# Patient Record
Sex: MALE | Employment: FULL TIME | URBAN - METROPOLITAN AREA
[De-identification: names, ages, dates, MRNs, and addresses within clinical notes are randomized per-mention and may not be internally consistent; named-entity substitution may affect disease eponyms.]

---

## 2023-02-09 ENCOUNTER — TELEPHONE (OUTPATIENT)
Dept: NEPHROLOGY | Facility: CLINIC | Age: 38
End: 2023-02-09
Payer: COMMERCIAL

## 2023-02-09 DIAGNOSIS — R79.89 ELEVATED SERUM CREATININE: Primary | ICD-10-CM

## 2023-02-09 NOTE — TELEPHONE ENCOUNTER
[facility] Call Center    Phone Message    May a detailed message be left on voicemail: yes     Reason for Call: Order(s): Other:   Reason for requested: new neph  Date needed: ASAP  Provider name: Vandana Helton MD    Fax - [phone]  Fax to [facility]  Action Taken: Message routed to:  [facility] ([facility]): Neph    Travel Screening: Not Applicable

## 2023-02-10 NOTE — TELEPHONE ENCOUNTER
DIAGNOSIS: Chronic Kidney Disease    DATE RECEIVED: 02.21.2023   NOTES STATUS DETAILS   OFFICE NOTE from referring provider Care Everywhere 02.09.2023 Rip Lora MD [facility]   OFFICE NOTE from other specialist  Care Everywhere 12.20.2022 Cher Nettles [place]    09.19.2022 Mati Wolf MD  [facility]     More in Care Everywhere   *Only VASCULITIS or LUPUS gather office notes for the following     *PULMONARY       *ENT     *DERMATOLOGY     *RHEUMATOLOGY     DISCHARGE SUMMARY from hospital     DISCHARGE REPORT from the ER     MEDICATION LIST Care Everywhere    IMAGING  (NEED IMAGES AND REPORTS)     KIDNEY CT SCAN     KIDNEY ULTRASOUND     MR ABDOMEN     NUCLEAR MEDICINE RENAL     LABS     CBC Care Everywhere 02.02.2023   CMP Care Everywhere 02.02.2023   BMP Care Everywhere 02.02.2023   UA Care Everywhere 12.27.2022   URINE PROTEIN Care Everywhere 12.27.2022   RENAL PANEL Care Everywhere 12.20.2022   BIOPSY     KIDNEY BIOPSY

## 2023-02-21 ENCOUNTER — PRE VISIT (OUTPATIENT)
Dept: NEPHROLOGY | Facility: CLINIC | Age: 38
End: 2023-02-21
Payer: COMMERCIAL

## 2023-03-23 ENCOUNTER — TELEPHONE (OUTPATIENT)
Dept: NEPHROLOGY | Facility: CLINIC | Age: 38
End: 2023-03-23
Payer: COMMERCIAL

## 2023-03-23 DIAGNOSIS — R79.89 ELEVATED SERUM CREATININE: Primary | ICD-10-CM

## 2023-03-23 NOTE — TELEPHONE ENCOUNTER
Nephrology Patient Completing Outside Labs    Appointment Date & Time: 5/15/2023    Preferred Lab: [facility]     Preferred Lab Locations: ([facility]) JIMMY Mckee    Fax: Pt doesn't have fax number and is requesting a call from clinic , pt will get fax number     Phone Number:

## 2023-04-08 ENCOUNTER — HEALTH MAINTENANCE LETTER (OUTPATIENT)
Age: 38
End: 2023-04-08

## 2023-05-15 ENCOUNTER — VIRTUAL VISIT (OUTPATIENT)
Dept: NEPHROLOGY | Facility: CLINIC | Age: 38
End: 2023-05-15
Attending: INTERNAL MEDICINE
Payer: COMMERCIAL

## 2023-05-15 DIAGNOSIS — D64.9 ANEMIA, UNSPECIFIED TYPE: ICD-10-CM

## 2023-05-15 DIAGNOSIS — Z94.4 LIVER REPLACED BY TRANSPLANT (H): ICD-10-CM

## 2023-05-15 DIAGNOSIS — N25.81 SECONDARY HYPERPARATHYROIDISM (H): ICD-10-CM

## 2023-05-15 DIAGNOSIS — D84.9 IMMUNOSUPPRESSION (H): ICD-10-CM

## 2023-05-15 DIAGNOSIS — E87.20 METABOLIC ACIDOSIS: ICD-10-CM

## 2023-05-15 DIAGNOSIS — E87.5 HYPERKALEMIA: ICD-10-CM

## 2023-05-15 DIAGNOSIS — Q44.2 BILIARY ATRESIA (H): ICD-10-CM

## 2023-05-15 DIAGNOSIS — N18.32 CHRONIC KIDNEY DISEASE, STAGE 3B (H): Primary | ICD-10-CM

## 2023-05-15 DIAGNOSIS — N28.1 BILATERAL RENAL CYSTS: ICD-10-CM

## 2023-05-15 PROCEDURE — 99205 OFFICE O/P NEW HI 60 MIN: CPT | Mod: VID | Performed by: INTERNAL MEDICINE

## 2023-05-15 NOTE — PATIENT INSTRUCTIONS
"Patient Education     Discharge Instructions for Hyperkalemia  You have been diagnosed with hyperkalemia. This means you have a high level of potassium in your blood. Potassium is important to the function of the nerve and muscle cells. This includes the cells of the heart. But a high level of potassium in the blood causes serious problems. These include abnormal heart rhythms and even heart attack.   Diet changes  Eat less of these potassium-rich foods:   Bananas  Apricots, fresh or dried  Oranges and orange juice  Grapefruit juice  Tomatoes, tomato sauce, and tomato juice  Spinach  Green, leafy vegetables, including salad greens, kale, broccoli, chard, and collards  Melons of all kinds  Peas  Beans  Potatoes  Sweet potatoes  Avocados and guacamole  Vegetable juice (homemade or store-bought) and vegetable juice cocktail  Fruit juices  Nuts, including pistachios, almonds, peanuts, hazelnuts, Brazil nuts, cashews, or mixed nuts  \"Lite\" or reduced sodium salt  Other home care  Tell your healthcare provider about all prescription and over-the-counter medicines. Also tell them about herbal or dietary supplements you are taking. Certain medicines and supplements can increase potassium levels.  Take all medicines exactly as directed.  Have your potassium levels checked regularly.  Keep all follow-up appointments. Your healthcare provider needs to monitor your condition closely.  Learn to take your own pulse. If your pulse is less than 60 beats per minute, greater than 100 beats per minute, or irregular, call your provider.    Follow-up  Follow up with your healthcare provider, or as advised.   When to call your healthcare provider  Call your healthcare provider right away if you have any of the following:   Slow, irregular heartbeat  Fatigue  Dizziness  Lightheadedness  Confusion  Weakness  Call 911  Call 911 if you have any of these:   Chest pain  Fainting  Shortness of breath  Rajani last reviewed this educational " content on 7/1/2022 2000-2022 [employer]. All rights reserved. This information is not intended as a substitute for professional medical care. Always follow your healthcare professional's instructions.

## 2023-05-15 NOTE — LETTER
5/15/2023       RE: Matthew Arias  [address]  [place] [address]     Dear Colleague,    Thank you for referring your patient, Matthew Arias, to the [facility] [place] at [facility]. Please see a copy of my visit note below.    Virtual Visit Details    Type of service:  Video Visit   Video Start Time: 2:56 PM  Video End Time:3:22 PM    Originating Location (pt. Location): Home  Distant Location (provider location):  On-site  Platform used for Video Visit: [facility]       Nephrology Clinic Note  May 15, 2023    Pt was self referred because of change in insurance    CC: CKD stage IIIb vs CKD stage IV    HPI: Matthew Arias is a 37 year old male with PMH significant for biliary atresia s/p 2 liver transplants, CKD, seizures who presents for evaluation and management of CKD.     Pt presented to nephrology video visit to establish care for his CKD. He was first notified about CKD was after his second transplant in 2003. Since that time he was known to have CKD that was slowly progressing. His transplant was done in [place], so following with nephrology there, but he moved to [place], he was instructed to find nephrology closer. Later he was seen in [place], last visit was in dec 2022. But his insurance change, so now establishing care with  or . He was having URI symptoms and was negative for common viruses yesterday including COVID. Mentioned he is slightly feeling better today. Currently on Doxy for possible infection. No new symptoms today. Not checking BP at home, but clinic BP been stable.     No new changes in his meds recently. Endorsed that with his first liver he had multiple rejection episodes but with second one, he is been more stable. He works full time.    - History of urinary symptoms: no  - History of Hematuria: no  - Swelling: no  - Hx of UTIs: no  - Hx of stones: no  - Rashes/Joint pain: no  - Family hx of  kidney disease: Grandfather had renal disease but does not know the cause  - NSAID use: no     Not on File    No current outpatient medications on file prior to visit.  No current facility-administered medications on file prior to visit.      No past medical history on file.    No past surgical history on file.    Social History     Tobacco Use    Smoking status: Never    Smokeless tobacco: Never   Vaping Use    Vaping status: Never Used       No family history on file.    ROS: A 12 system review of systems was negative other than noted here or above.     Exam:  There were no vitals taken for this visit.    GENERAL APPEARANCE: alert and no distress  EYES:  no scleral icterus  HENT: no gross abnormalities noted  RESP: able to speak in full sentences, no audible wheezing or no accessory muscle usage.   CV: denied edema  SKIN: no visible facial rash  NEURO: mentation intact and speech normal  PSYCH: affect normal/bright    Results:    No visits with results within 1 Day(s) from this visit.   Latest known visit with results is:   No results found for any previous visit.       Assessment/Plan:     CKD stage IIIb vs IV  Recurrent TRISTON episodes  B/l multiple renal cysts  Pt with baseline creatinine of 2.2 to 2.8 since 2017 with eGFR on 30 and 40's which put him in CKD stage IIIb. Creatinine can up trend to 3's with eGFR in 20's but improves to eGFR of 30's. UA was bland with out hematuria. Noted to have proteinuria but repeat was WNL. CT abdomen in June 2022 showed multiple cysts like before which likely hemorrhagic but no nephrolithiasis or hydronephrosis noted. CKD likely 2/2 CNI use and or renal cyst disease. Change in creatinine likely 2/2 hypovolemia vs others. Pt denied any ibuprofen or other NSAID's Use.  - encouraged good hydration   - low salt diet   - fresh fruits and vegetables   - continue to monitor renal function   - will consider transplant referral during next visit based on his labs     Lower urinary tract  symptoms   Bladder wall thickness increased  Pt noted to wake up once a night in the recent past. He did made some life style changes including voiding bladder before going to bed and decrease fluid intake during evening times. Will follow up in his symptoms, if similar or getting worse, will refer to urology for urodynamics before starting on tamsulosin as he is young.    Hypertension :  BP been stable during clinic visits (in care every where). Was on losartan but discontinued because of hypotension. Not on meds now.  - continue to monitor    Electrolytes :  Hyperkalemia, mild  Noted potassium of 5.3 likely 2/2 CKD. Continue to monitor for now. Increase hydration, continue bicarb supplementation and decrease potassium intake   - gave hand out about potassium rich foods to avoid    BMD :  Daniel was WNL  Secondary hyperparathyroidism   PTH is elevated likely 2/2 CKD. No vit D collected during this visit. But instructed to continue vit D     Metabolic acidosis :   Bicarb was low but repeat was at 22, continue on current supplementation     Anemia :  Hb is low, but above goal for ERIKA therapy. Continue to monitor for now.    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  Was on CNI, now on MMF and prednisone.    Total time spent on the day of clinic visit was 60 min including face to face time of 30 min with pt, labs and image review in care every where, previous nephrology note in care every where, documentation as above.    Vandana Helton  Dept of Nephrology and Hypertension  [facility]

## 2023-05-15 NOTE — PROGRESS NOTES
Virtual Visit Details    Type of service:  Video Visit   Video Start Time: 2:56 PM  Video End Time:3:22 PM    Originating Location (pt. Location): Home  Distant Location (provider location):  On-site  Platform used for Video Visit: Well       Nephrology Clinic Note  May 15, 2023    Pt was self referred because of change in insurance    CC: CKD stage IIIb vs CKD stage IV    HPI: Matthew Arias is a 37 year old male with PMH significant for biliary atresia s/p 2 liver transplants, CKD, seizures who presents for evaluation and management of CKD.     Pt presented to nephrology video visit to establish care for his CKD. He was first notified about CKD was after his second transplant in 2003. Since that time he was known to have CKD that was slowly progressing. His transplant was done in [place], so following with nephrology there, but he moved to [place], he was instructed to find nephrology closer. Later he was seen in [place], last visit was in dec 2022. But his insurance change, so now establishing care with [facility] or [facility]. He was having URI symptoms and was negative for common viruses yesterday including COVID. Mentioned he is slightly feeling better today. Currently on Doxy for possible infection. No new symptoms today. Not checking BP at home, but clinic BP been stable.     No new changes in his meds recently. Endorsed that with his first liver he had multiple rejection episodes but with second one, he is been more stable. He works full time.    - History of urinary symptoms: no  - History of Hematuria: no  - Swelling: no  - Hx of UTIs: no  - Hx of stones: no  - Rashes/Joint pain: no  - Family hx of kidney disease: Grandfather had renal disease but does not know the cause  - NSAID use: no     Not on File    No current outpatient medications on file prior to visit.  No current facility-administered medications on file prior to visit.      No past medical history on file.    No past surgical history on file.    Social  History     Tobacco Use     Smoking status: Never     Smokeless tobacco: Never   Vaping Use     Vaping status: Never Used       No family history on file.    ROS: A 12 system review of systems was negative other than noted here or above.     Exam:  There were no vitals taken for this visit.    GENERAL APPEARANCE: alert and no distress  EYES:  no scleral icterus  HENT: no gross abnormalities noted  RESP: able to speak in full sentences, no audible wheezing or no accessory muscle usage.   CV: denied edema  SKIN: no visible facial rash  NEURO: mentation intact and speech normal  PSYCH: affect normal/bright    Results:    No visits with results within 1 Day(s) from this visit.   Latest known visit with results is:   No results found for any previous visit.       Assessment/Plan:     CKD stage IIIb vs IV  Recurrent TRISTON episodes  B/l multiple renal cysts  Pt with baseline creatinine of 2.2 to 2.8 since 2017 with eGFR on 30 and 40's which put him in CKD stage IIIb. Creatinine can up trend to 3's with eGFR in 20's but improves to eGFR of 30's. UA was bland with out hematuria. Noted to have proteinuria but repeat was WNL. CT abdomen in June 2022 showed multiple cysts like before which likely hemorrhagic but no nephrolithiasis or hydronephrosis noted. CKD likely 2/2 CNI use and or renal cyst disease. Change in creatinine likely 2/2 hypovolemia vs others. Pt denied any ibuprofen or other NSAID's Use.  - encouraged good hydration   - low salt diet   - fresh fruits and vegetables   - continue to monitor renal function   - will consider transplant referral during next visit based on his labs     Lower urinary tract symptoms   Bladder wall thickness increased  Pt noted to wake up once a night in the recent past. He did made some life style changes including voiding bladder before going to bed and decrease fluid intake during evening times. Will follow up in his symptoms, if similar or getting worse, will refer to urology for  urodynamics before starting on tamsulosin as he is young.    Hypertension :  BP been stable during clinic visits (in care every where). Was on losartan but discontinued because of hypotension. Not on meds now.  - continue to monitor    Electrolytes :  Hyperkalemia, mild  Noted potassium of 5.3 likely 2/2 CKD. Continue to monitor for now. Increase hydration, continue bicarb supplementation and decrease potassium intake   - gave hand out about potassium rich foods to avoid    BMD :  Daniel was WNL  Secondary hyperparathyroidism   PTH is elevated likely 2/2 CKD. No vit D collected during this visit. But instructed to continue vit D     Metabolic acidosis :   Bicarb was low but repeat was at 22, continue on current supplementation     Anemia :  Hb is low, but above goal for ERIKA therapy. Continue to monitor for now.    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  Was on CNI, now on MMF and prednisone.    Total time spent on the day of clinic visit was 60 min including face to face time of 30 min with pt, labs and image review in care every where, previous nephrology note in care every where, documentation as above.    Vandana Helton  Dept of Nephrology and Hypertension  [facility]

## 2023-05-15 NOTE — NURSING NOTE
Is the patient currently in the state of [place]? YES    Visit mode:VIDEO    If the visit is dropped, the patient can be reconnected by: VIDEO VISIT: Send to e-mail at: [email or web address]    Will anyone else be joining the visit? NO      How would you like to obtain your AVS? MyChart    Are changes needed to the allergy or medication list? NO    Reason for visit: Video Visit

## 2023-10-02 ENCOUNTER — TRANSFERRED RECORDS (OUTPATIENT)
Dept: HEALTH INFORMATION MANAGEMENT | Facility: CLINIC | Age: 38
End: 2023-10-02

## 2024-01-05 ENCOUNTER — OFFICE VISIT (OUTPATIENT)
Dept: NEPHROLOGY | Facility: CLINIC | Age: 39
End: 2024-01-05
Attending: INTERNAL MEDICINE
Payer: COMMERCIAL

## 2024-01-05 ENCOUNTER — LAB (OUTPATIENT)
Dept: LAB | Facility: CLINIC | Age: 39
End: 2024-01-05
Payer: COMMERCIAL

## 2024-01-05 VITALS
OXYGEN SATURATION: 98 % | TEMPERATURE: 99.5 F | HEART RATE: 89 BPM | SYSTOLIC BLOOD PRESSURE: 116 MMHG | DIASTOLIC BLOOD PRESSURE: 68 MMHG

## 2024-01-05 DIAGNOSIS — E87.20 METABOLIC ACIDOSIS: ICD-10-CM

## 2024-01-05 DIAGNOSIS — E83.51 HYPOCALCEMIA: ICD-10-CM

## 2024-01-05 DIAGNOSIS — D63.1 ANEMIA IN STAGE 3B CHRONIC KIDNEY DISEASE (H): ICD-10-CM

## 2024-01-05 DIAGNOSIS — N25.81 SECONDARY HYPERPARATHYROIDISM (H): ICD-10-CM

## 2024-01-05 DIAGNOSIS — Z94.4 LIVER REPLACED BY TRANSPLANT (H): Primary | ICD-10-CM

## 2024-01-05 DIAGNOSIS — D84.9 IMMUNOSUPPRESSION (H): ICD-10-CM

## 2024-01-05 DIAGNOSIS — N18.32 ANEMIA IN STAGE 3B CHRONIC KIDNEY DISEASE (H): ICD-10-CM

## 2024-01-05 DIAGNOSIS — E55.9 HYPOVITAMINOSIS D: ICD-10-CM

## 2024-01-05 DIAGNOSIS — N28.1 BILATERAL RENAL CYSTS: ICD-10-CM

## 2024-01-05 DIAGNOSIS — R80.9 PROTEINURIA, UNSPECIFIED TYPE: ICD-10-CM

## 2024-01-05 DIAGNOSIS — R79.89 ELEVATED SERUM CREATININE: ICD-10-CM

## 2024-01-05 LAB
ALBUMIN MFR UR ELPH: 233 MG/DL
ALBUMIN SERPL BCG-MCNC: 3.6 G/DL (ref 3.5–5.2)
ALBUMIN UR-MCNC: 100 MG/DL
ANION GAP SERPL CALCULATED.3IONS-SCNC: 10 MMOL/L (ref 7–15)
APPEARANCE UR: CLEAR
BILIRUB UR QL STRIP: NEGATIVE
BUN SERPL-MCNC: 41.3 MG/DL (ref 6–20)
CALCIUM SERPL-MCNC: 8.2 MG/DL (ref 8.6–10)
CHLORIDE SERPL-SCNC: 105 MMOL/L (ref 98–107)
COLOR UR AUTO: ABNORMAL
CREAT SERPL-MCNC: 2.55 MG/DL (ref 0.67–1.17)
CREAT UR-MCNC: 65.2 MG/DL
DEPRECATED HCO3 PLAS-SCNC: 21 MMOL/L (ref 22–29)
EGFRCR SERPLBLD CKD-EPI 2021: 32 ML/MIN/1.73M2
ERYTHROCYTE [DISTWIDTH] IN BLOOD BY AUTOMATED COUNT: 14.9 % (ref 10–15)
GLUCOSE SERPL-MCNC: 105 MG/DL (ref 70–99)
GLUCOSE UR STRIP-MCNC: NEGATIVE MG/DL
HCT VFR BLD AUTO: 34.3 % (ref 40–53)
HGB BLD-MCNC: 11.2 G/DL (ref 13.3–17.7)
HGB UR QL STRIP: NEGATIVE
HYALINE CASTS: 5 /LPF
KETONES UR STRIP-MCNC: NEGATIVE MG/DL
LEUKOCYTE ESTERASE UR QL STRIP: NEGATIVE
MCH RBC QN AUTO: 28.4 PG (ref 26.5–33)
MCHC RBC AUTO-ENTMCNC: 32.7 G/DL (ref 31.5–36.5)
MCV RBC AUTO: 87 FL (ref 78–100)
NITRATE UR QL: NEGATIVE
PH UR STRIP: 5.5 [PH] (ref 5–7)
PHOSPHATE SERPL-MCNC: 2.6 MG/DL (ref 2.5–4.5)
PLATELET # BLD AUTO: 345 10E3/UL (ref 150–450)
POTASSIUM SERPL-SCNC: 3.8 MMOL/L (ref 3.4–5.3)
PROT/CREAT 24H UR: 3.57 MG/MG CR (ref 0–0.2)
PTH-INTACT SERPL-MCNC: 139 PG/ML (ref 15–65)
PTH-INTACT SERPL-MCNC: 142 PG/ML (ref 15–65)
RBC # BLD AUTO: 3.95 10E6/UL (ref 4.4–5.9)
RBC URINE: 1 /HPF
SODIUM SERPL-SCNC: 136 MMOL/L (ref 135–145)
SP GR UR STRIP: 1.01 (ref 1–1.03)
UROBILINOGEN UR STRIP-MCNC: NORMAL MG/DL
VIT D+METAB SERPL-MCNC: 16 NG/ML (ref 20–50)
WBC # BLD AUTO: 13.9 10E3/UL (ref 4–11)
WBC URINE: <1 /HPF

## 2024-01-05 PROCEDURE — 80069 RENAL FUNCTION PANEL: CPT | Performed by: PATHOLOGY

## 2024-01-05 PROCEDURE — 99215 OFFICE O/P EST HI 40 MIN: CPT | Performed by: INTERNAL MEDICINE

## 2024-01-05 PROCEDURE — 84156 ASSAY OF PROTEIN URINE: CPT | Performed by: PATHOLOGY

## 2024-01-05 PROCEDURE — 82306 VITAMIN D 25 HYDROXY: CPT | Performed by: INTERNAL MEDICINE

## 2024-01-05 PROCEDURE — 85027 COMPLETE CBC AUTOMATED: CPT | Performed by: PATHOLOGY

## 2024-01-05 PROCEDURE — 99211 OFF/OP EST MAY X REQ PHY/QHP: CPT | Performed by: INTERNAL MEDICINE

## 2024-01-05 PROCEDURE — 82570 ASSAY OF URINE CREATININE: CPT | Performed by: INTERNAL MEDICINE

## 2024-01-05 PROCEDURE — 99000 SPECIMEN HANDLING OFFICE-LAB: CPT | Performed by: PATHOLOGY

## 2024-01-05 PROCEDURE — 83970 ASSAY OF PARATHORMONE: CPT | Performed by: PATHOLOGY

## 2024-01-05 PROCEDURE — 81001 URINALYSIS AUTO W/SCOPE: CPT | Performed by: PATHOLOGY

## 2024-01-05 PROCEDURE — 36415 COLL VENOUS BLD VENIPUNCTURE: CPT | Performed by: PATHOLOGY

## 2024-01-05 RX ORDER — PREDNISONE 5 MG/1
10 TABLET ORAL DAILY
COMMUNITY

## 2024-01-05 RX ORDER — MYCOPHENOLATE MOFETIL 250 MG/1
2 CAPSULE ORAL EVERY 12 HOURS
COMMUNITY
Start: 2023-04-13

## 2024-01-05 RX ORDER — LEVETIRACETAM 500 MG/1
500 TABLET ORAL EVERY 12 HOURS
COMMUNITY
Start: 2023-04-13

## 2024-01-05 RX ORDER — ACETAMINOPHEN 500 MG
500 TABLET ORAL
COMMUNITY
Start: 2023-07-01

## 2024-01-05 RX ORDER — SODIUM BICARBONATE 325 MG/1
TABLET ORAL
COMMUNITY
Start: 2023-04-13

## 2024-01-05 RX ORDER — OMEPRAZOLE 40 MG/1
1 CAPSULE, DELAYED RELEASE ORAL EVERY 12 HOURS
COMMUNITY
Start: 2023-12-13

## 2024-01-05 RX ORDER — BUPROPION HYDROCHLORIDE 100 MG/1
150 TABLET, EXTENDED RELEASE ORAL
COMMUNITY

## 2024-01-05 ASSESSMENT — PAIN SCALES - GENERAL: PAINLEVEL: NO PAIN (0)

## 2024-01-05 NOTE — LETTER
1/5/2024       RE: Matthew Arias  [address]  [place] [address]     Dear Colleague,    Thank you for referring your patient, Mathtew Arias, to the [facility] [place] at [facility]. Please see a copy of my visit note below.        Nephrology Clinic Note    Pt was self referred because of change in insurance    CC: CKD stage IIIb vs CKD stage IV    HPI: Matthew Arias is a 37 year old male with PMH significant for biliary atresia s/p 2 liver transplants, CKD, seizures who presents for evaluation and management of CKD.     Pt presented to nephrology video visit to establish care for his CKD. He was first notified about CKD was after his second transplant in 2003. Since that time he was known to have CKD that was slowly progressing. His transplant was done in [place], so following with nephrology there, but he moved to [place], he was instructed to find nephrology closer. Later he was seen in [place], last visit was in dec 2022. But his insurance change, so now establishing care with  or . He was having URI symptoms and was negative for common viruses yesterday including COVID. Mentioned he is slightly feeling better today. Currently on Doxy for possible infection. No new symptoms today. Not checking BP at home, but clinic BP been stable.     No new changes in his meds recently. Endorsed that with his first liver he had multiple rejection episodes but with second one, he is been more stable. He works full time.    1/5/2024  Pt with new URI symptoms started 2 days ago. He felt his symptoms are stable today. H/o COVID Infection, last time was 1.5 years ago, cleared on its own, no hospitalization. Had COVID vaccines and booster mid of last year. No associated cough, chest pain or SOB. Denied nausea/vomiting, diarrhea, abdominal pain. H/o second Liver transplant 22 years ago. Continued to be on immunosuppressive  medications.    - History of urinary symptoms: no  - History of Hematuria: no  - Swelling: no  - Hx of UTIs: no  - Hx of stones: no  - Rashes/Joint pain: no  - Family hx of kidney disease: Grandfather had renal disease but does not know the cause  - NSAID use: no       Allergies   Allergen Reactions    Levofloxacin      Other Reaction(s): Renal Failure    Hx of renal failure    Amoxicillin-Pot Clavulanate Rash     Patient reported this was a couple years ago and he did not need treatment for the rash. Just stopped taking the medication.    Bee Pollen Itching     Allergic Rhinitis, Itchy watery eyes    Seasonal Allergies Itching     Allergic Rhinitis, Itchy watery eyes    Cefprozil GI Disturbance     Other Reaction(s): GI Upset    Clavulanic Acid Unknown     Other Reaction(s): Other (see comments)    Erythromycin      Other Reaction(s): GI UPSET, GI Upset    Gramineae Pollens Other (See Comments)    Sulfa Antibiotics      Other Reaction(s): *Unknown - Childhood Rxn    Macrolides And Ketolides Rash     Other Reaction(s): GI UPSET, UNKNOWN    Penicillins Rash       No current outpatient medications on file prior to visit.  No current facility-administered medications on file prior to visit.      No past medical history on file.    No past surgical history on file.    Social History     Tobacco Use    Smoking status: Never    Smokeless tobacco: Never   Vaping Use    Vaping Use: Never used   Substance Use Topics    Drug use: Never       No family history on file.    ROS: A 12 system review of systems was negative other than noted here or above.     Exam:  /68 (BP Location: Right arm, Patient Position: Sitting, Cuff Size: Adult Regular)   Pulse 89   Temp 99.5  F (37.5  C) (Oral)   SpO2 98%     GENERAL APPEARANCE: alert and no distress  EYES:  no scleral icterus  HENT: no gross abnormalities noted  RESP: able to speak in full sentences, no audible wheezing or no accessory muscle usage.   CV: denied edema  SKIN: no  visible facial rash  NEURO: mentation intact and speech normal  PSYCH: affect normal/bright    Results:    No visits with results within 1 Day(s) from this visit.   Latest known visit with results is:   No results found for any previous visit.       Assessment/Plan:     CKD stage IIIb vs IV  Recurrent TRISTON episodes  B/l multiple renal cysts  Pt with baseline creatinine of 2.2 to 2.8 since 2017 with eGFR on 30 and 40's which put him in CKD stage IIIb. Creatinine can up trend to 3's with eGFR in 20's but improves to eGFR of 30's. UA was bland with out hematuria. Noted to have proteinuria but repeat was WNL. CT abdomen in June 2022 showed multiple cysts like before which likely hemorrhagic but no nephrolithiasis or hydronephrosis noted. CKD likely 2/2 CNI use and or renal cyst disease. Change in creatinine likely 2/2 hypovolemia vs others. Pt denied any ibuprofen or other NSAID's Use.  - encouraged good hydration   - low salt diet   - fresh fruits and vegetables   - continue to monitor renal function   - will consider transplant referral during next visit based on his labs   - will repeat his renal US to assess renal cysts, ? About polycystic renal disease.    Lower urinary tract symptoms   Bladder wall thickness increased  Pt noted to wake up once a night in the recent past. He did made some life style changes including voiding bladder before going to bed and decrease fluid intake during evening times. No worsening symptoms reported today.    Hypertension :  BP been stable during clinic visits (in care every where). Was on losartan but discontinued because of hypotension. Not on meds now.  - continue to monitor    Electrolytes :  Hyperkalemia, mild, improved on today's labs  Noted potassium of 5.3 likely 2/2 CKD. Continue to monitor for now with increase hydration, continuing bicarb supplementation.    BMD :  Daniel was low, ? Low vitamin D  Secondary hyperparathyroidism   PTH is elevated likely 2/2 CKD.  On low dose  vitamin D supplementation, takes it when remember   His previous Vit D is low, repeat today is still pending. If low will restart him on daily vs weekly supplementation.    Metabolic acidosis :   Bicarb was low, continue on current supplementation     Anemia :  Hb is low, but above goal for ERIKA therapy. Continue to monitor for now.    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  Was on CNI, now on MMF and prednisone.    Total time spent on the day of clinic visit was 40 min including face to face time of 20 min with pt, labs and image review in care every where, previous nephrology note in care every where, documentation as above.    Vandana Helton  Dept of Nephrology and Hypertension  [facility]

## 2024-01-05 NOTE — PROGRESS NOTES
Nephrology Clinic Note    Pt was self referred because of change in insurance    CC: CKD stage IIIb vs CKD stage IV    HPI: Matthew Arias is a 37 year old male with PMH significant for biliary atresia s/p 2 liver transplants, CKD, seizures who presents for evaluation and management of CKD.     Pt presented to nephrology video visit to establish care for his CKD. He was first notified about CKD was after his second transplant in 2003. Since that time he was known to have CKD that was slowly progressing. His transplant was done in [place], so following with nephrology there, but he moved to [place], he was instructed to find nephrology closer. Later he was seen in [place], last visit was in dec 2022. But his insurance change, so now establishing care with U or M. He was having URI symptoms and was negative for common viruses yesterday including COVID. Mentioned he is slightly feeling better today. Currently on Doxy for possible infection. No new symptoms today. Not checking BP at home, but clinic BP been stable.     No new changes in his meds recently. Endorsed that with his first liver he had multiple rejection episodes but with second one, he is been more stable. He works full time.    1/5/2024  Pt with new URI symptoms started 2 days ago. He felt his symptoms are stable today. H/o COVID Infection, last time was 1.5 years ago, cleared on its own, no hospitalization. Had COVID vaccines and booster mid of last year. No associated cough, chest pain or SOB. Denied nausea/vomiting, diarrhea, abdominal pain. H/o second Liver transplant 22 years ago. Continued to be on immunosuppressive medications.    - History of urinary symptoms: no  - History of Hematuria: no  - Swelling: no  - Hx of UTIs: no  - Hx of stones: no  - Rashes/Joint pain: no  - Family hx of kidney disease: Grandfather had renal disease but does not know the cause  - NSAID use: no       Allergies   Allergen Reactions    Levofloxacin      Other  Reaction(s): Renal Failure    Hx of renal failure    Amoxicillin-Pot Clavulanate Rash     Patient reported this was a couple years ago and he did not need treatment for the rash. Just stopped taking the medication.    Bee Pollen Itching     Allergic Rhinitis, Itchy watery eyes    Seasonal Allergies Itching     Allergic Rhinitis, Itchy watery eyes    Cefprozil GI Disturbance     Other Reaction(s): GI Upset    Clavulanic Acid Unknown     Other Reaction(s): Other (see comments)    Erythromycin      Other Reaction(s): GI UPSET, GI Upset    Gramineae Pollens Other (See Comments)    Sulfa Antibiotics      Other Reaction(s): *Unknown - Childhood Rxn    Macrolides And Ketolides Rash     Other Reaction(s): GI UPSET, UNKNOWN    Penicillins Rash       No current outpatient medications on file prior to visit.  No current facility-administered medications on file prior to visit.      No past medical history on file.    No past surgical history on file.    Social History     Tobacco Use    Smoking status: Never    Smokeless tobacco: Never   Vaping Use    Vaping Use: Never used   Substance Use Topics    Drug use: Never       No family history on file.    ROS: A 12 system review of systems was negative other than noted here or above.     Exam:  /68 (BP Location: Right arm, Patient Position: Sitting, Cuff Size: Adult Regular)   Pulse 89   Temp 99.5  F (37.5  C) (Oral)   SpO2 98%     GENERAL APPEARANCE: alert and no distress  EYES:  no scleral icterus  HENT: no gross abnormalities noted  RESP: able to speak in full sentences, no audible wheezing or no accessory muscle usage.   CV: denied edema  SKIN: no visible facial rash  NEURO: mentation intact and speech normal  PSYCH: affect normal/bright    Results:    No visits with results within 1 Day(s) from this visit.   Latest known visit with results is:   No results found for any previous visit.       Assessment/Plan:     CKD stage IIIb vs IV  Recurrent TRISTON episodes  B/l  multiple renal cysts  Pt with baseline creatinine of 2.2 to 2.8 since 2017 with eGFR on 30 and 40's which put him in CKD stage IIIb. Creatinine can up trend to 3's with eGFR in 20's but improves to eGFR of 30's. UA was bland with out hematuria. Noted to have proteinuria but repeat was WNL. CT abdomen in June 2022 showed multiple cysts like before which likely hemorrhagic but no nephrolithiasis or hydronephrosis noted. CKD likely 2/2 CNI use and or renal cyst disease. Change in creatinine likely 2/2 hypovolemia vs others. Pt denied any ibuprofen or other NSAID's Use.  - encouraged good hydration   - low salt diet   - fresh fruits and vegetables   - continue to monitor renal function   - will consider transplant referral during next visit based on his labs   - will repeat his renal US to assess renal cysts, ? About polycystic renal disease.    Lower urinary tract symptoms   Bladder wall thickness increased  Pt noted to wake up once a night in the recent past. He did made some life style changes including voiding bladder before going to bed and decrease fluid intake during evening times. No worsening symptoms reported today.    Hypertension :  BP been stable during clinic visits (in care every where). Was on losartan but discontinued because of hypotension. Not on meds now.  - continue to monitor    Electrolytes :  Hyperkalemia, mild, improved on today's labs  Noted potassium of 5.3 likely 2/2 CKD. Continue to monitor for now with increase hydration, continuing bicarb supplementation.    BMD :  Daniel was low, ? Low vitamin D  Secondary hyperparathyroidism   PTH is elevated likely 2/2 CKD.  On low dose vitamin D supplementation, takes it when remember   His previous Vit D is low, repeat today is still pending. If low will restart him on daily vs weekly supplementation.    Metabolic acidosis :   Bicarb was low, continue on current supplementation     Anemia :  Hb is low, but above goal for ERIKA therapy. Continue to monitor  for now.    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  Was on CNI, now on MMF and prednisone.    Total time spent on the day of clinic visit was 40 min including face to face time of 20 min with pt, labs and image review in care every where, previous nephrology note in care every where, documentation as above.    Vandana Helton  Dept of Nephrology and Hypertension  [facility]

## 2024-01-05 NOTE — NURSING NOTE
Chief Complaint   Patient presents with    RECHECK     RTN NEPH     /68 (BP Location: Right arm, Patient Position: Sitting, Cuff Size: Adult Regular)   Pulse 89   Temp 99.5  F (37.5  C) (Oral)   SpO2 98%   Nathan Wolf CMA on 1/5/2024 at 3:13 PM

## 2024-01-09 DIAGNOSIS — R80.9 PROTEINURIA, UNSPECIFIED TYPE: Primary | ICD-10-CM

## 2024-01-10 LAB
CREAT UR-MCNC: 65.2 MG/DL
MICROALBUMIN UR-MCNC: 1242 MG/L
MICROALBUMIN/CREAT UR: 1904.91 MG/G CR (ref 0–17)

## 2024-01-16 ENCOUNTER — HOSPITAL ENCOUNTER (OUTPATIENT)
Dept: ULTRASOUND IMAGING | Facility: CLINIC | Age: 39
Discharge: HOME | End: 2024-01-16
Attending: INTERNAL MEDICINE | Admitting: INTERNAL MEDICINE
Payer: COMMERCIAL

## 2024-01-16 DIAGNOSIS — N28.1 BILATERAL RENAL CYSTS: ICD-10-CM

## 2024-01-16 PROCEDURE — 76770 US EXAM ABDO BACK WALL COMP: CPT

## 2024-03-12 ENCOUNTER — TELEPHONE (OUTPATIENT)
Dept: NEPHROLOGY | Facility: CLINIC | Age: 39
End: 2024-03-12
Payer: COMMERCIAL

## 2024-03-29 ENCOUNTER — TELEPHONE (OUTPATIENT)
Dept: NEPHROLOGY | Facility: CLINIC | Age: 39
End: 2024-03-29
Payer: COMMERCIAL

## 2024-04-15 ENCOUNTER — TRANSFERRED RECORDS (OUTPATIENT)
Dept: HEALTH INFORMATION MANAGEMENT | Facility: CLINIC | Age: 39
End: 2024-04-15

## 2024-04-17 ENCOUNTER — TELEPHONE (OUTPATIENT)
Dept: NEPHROLOGY | Facility: CLINIC | Age: 39
End: 2024-04-17
Payer: COMMERCIAL

## 2024-04-17 NOTE — TELEPHONE ENCOUNTER
LVM for pt to reschedule appt on 7.8.24 with Dr. Mohan // informed pt in  that the appt has been cancelled // second attempt 4.17.24, AN

## 2024-06-09 ENCOUNTER — HEALTH MAINTENANCE LETTER (OUTPATIENT)
Age: 39
End: 2024-06-09

## 2024-10-15 ENCOUNTER — TELEPHONE (OUTPATIENT)
Dept: NEPHROLOGY | Facility: CLINIC | Age: 39
End: 2024-10-15
Payer: COMMERCIAL

## 2024-10-15 DIAGNOSIS — N18.32 CHRONIC KIDNEY DISEASE, STAGE 3B (H): Primary | ICD-10-CM

## 2024-10-15 NOTE — TELEPHONE ENCOUNTER
Health Call Center    Phone Message    May a detailed message be left on voicemail: no     Reason for Call: Other: Patient called and is wondering who he should be seeing for a follow up apportionment since Dr Helton is just doing transplant appointments. Please call patient back to confirm.      Action Taken: Message routed to:  [facility] ([facility]): Nephrology    Travel Screening: Not Applicable     Date of Service:

## 2024-10-16 ENCOUNTER — OFFICE VISIT (OUTPATIENT)
Dept: NEPHROLOGY | Facility: CLINIC | Age: 39
End: 2024-10-16
Attending: INTERNAL MEDICINE
Payer: COMMERCIAL

## 2024-10-16 ENCOUNTER — LAB (OUTPATIENT)
Dept: LAB | Facility: CLINIC | Age: 39
End: 2024-10-16
Payer: COMMERCIAL

## 2024-10-16 VITALS
HEART RATE: 72 BPM | WEIGHT: 149 LBS | DIASTOLIC BLOOD PRESSURE: 71 MMHG | SYSTOLIC BLOOD PRESSURE: 130 MMHG | OXYGEN SATURATION: 100 % | TEMPERATURE: 97.5 F

## 2024-10-16 DIAGNOSIS — Z94.4 LIVER REPLACED BY TRANSPLANT (H): ICD-10-CM

## 2024-10-16 DIAGNOSIS — E55.9 HYPOVITAMINOSIS D: ICD-10-CM

## 2024-10-16 DIAGNOSIS — R73.9 ELEVATED BLOOD SUGAR: ICD-10-CM

## 2024-10-16 DIAGNOSIS — N18.4 CKD (CHRONIC KIDNEY DISEASE) STAGE 4, GFR 15-29 ML/MIN (H): Primary | ICD-10-CM

## 2024-10-16 DIAGNOSIS — N18.32 CHRONIC KIDNEY DISEASE, STAGE 3B (H): ICD-10-CM

## 2024-10-16 DIAGNOSIS — R80.1 PERSISTENT PROTEINURIA: ICD-10-CM

## 2024-10-16 LAB
ALBUMIN MFR UR ELPH: 39.6 MG/DL
ALBUMIN SERPL BCG-MCNC: 3.8 G/DL (ref 3.5–5.2)
ANION GAP SERPL CALCULATED.3IONS-SCNC: 9 MMOL/L (ref 7–15)
BUN SERPL-MCNC: 31.4 MG/DL (ref 6–20)
CALCIUM SERPL-MCNC: 9.1 MG/DL (ref 8.8–10.4)
CHLORIDE SERPL-SCNC: 106 MMOL/L (ref 98–107)
CREAT SERPL-MCNC: 2.83 MG/DL (ref 0.67–1.17)
CREAT UR-MCNC: 37.4 MG/DL
CYSTATIN C (ROCHE): 2.7 MG/L (ref 0.6–1)
EGFRCR SERPLBLD CKD-EPI 2021: 28 ML/MIN/1.73M2
EST. AVERAGE GLUCOSE BLD GHB EST-MCNC: 120 MG/DL
GFR/BSA.PRED SERPLBLD CYS-BASED-ARV: 23 ML/MIN/1.73M2
GLUCOSE SERPL-MCNC: 80 MG/DL (ref 70–99)
HBA1C MFR BLD: 5.8 %
HCO3 SERPL-SCNC: 22 MMOL/L (ref 22–29)
HGB BLD-MCNC: 12.3 G/DL (ref 13.3–17.7)
PHOSPHATE SERPL-MCNC: 3.1 MG/DL (ref 2.5–4.5)
POTASSIUM SERPL-SCNC: 4 MMOL/L (ref 3.4–5.3)
PROT/CREAT 24H UR: 1.06 MG/MG CR (ref 0–0.2)
SODIUM SERPL-SCNC: 137 MMOL/L (ref 135–145)
VIT D+METAB SERPL-MCNC: 30 NG/ML (ref 20–50)

## 2024-10-16 PROCEDURE — 82610 CYSTATIN C: CPT | Performed by: INTERNAL MEDICINE

## 2024-10-16 PROCEDURE — 84156 ASSAY OF PROTEIN URINE: CPT | Performed by: PATHOLOGY

## 2024-10-16 PROCEDURE — 85018 HEMOGLOBIN: CPT | Performed by: PATHOLOGY

## 2024-10-16 PROCEDURE — 83036 HEMOGLOBIN GLYCOSYLATED A1C: CPT | Performed by: INTERNAL MEDICINE

## 2024-10-16 PROCEDURE — 82306 VITAMIN D 25 HYDROXY: CPT | Performed by: INTERNAL MEDICINE

## 2024-10-16 PROCEDURE — 80069 RENAL FUNCTION PANEL: CPT | Performed by: PATHOLOGY

## 2024-10-16 PROCEDURE — 99213 OFFICE O/P EST LOW 20 MIN: CPT | Performed by: INTERNAL MEDICINE

## 2024-10-16 PROCEDURE — 36415 COLL VENOUS BLD VENIPUNCTURE: CPT | Performed by: PATHOLOGY

## 2024-10-16 PROCEDURE — 99215 OFFICE O/P EST HI 40 MIN: CPT | Performed by: INTERNAL MEDICINE

## 2024-10-16 PROCEDURE — 99000 SPECIMEN HANDLING OFFICE-LAB: CPT | Performed by: PATHOLOGY

## 2024-10-16 RX ORDER — VITAMIN B COMPLEX
1 TABLET ORAL DAILY
Qty: 90 TABLET | Refills: 3 | Status: SHIPPED | OUTPATIENT
Start: 2024-10-16

## 2024-10-16 ASSESSMENT — PAIN SCALES - GENERAL: PAINLEVEL: NO PAIN (0)

## 2024-10-16 NOTE — LETTER
10/16/2024       RE: Matthew Arias  [address]  [place] [address]     Dear Colleague,    Thank you for referring your patient, Matthew Arias, to the [facility] [place] at [facility]. Please see a copy of my visit note below.        Nephrology Clinic Note      Assessment/Plan:     CKD stage 4- Scr fluctuates between 2.6-3s eGFR was 28ml/min by cystatin C in 2022   Recurrent TRISTON episodes  B/l multiple renal cysts  Pt with baseline creatinine of 2.2 to 2.8 since 2017 with eGFR on 30 and 40's which put him in CKD stage IIIb. Creatinine can up trend to 3's with eGFR in 20's but improves to eGFR of 30's. UA was bland with out hematuria. Noted to have proteinuria but repeat was WNL. CT abdomen in June 2022 showed multiple cysts like before which likely hemorrhagic but no nephrolithiasis or hydronephrosis noted. No genetic testing done.  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 Change in creatinine likely 2/2 hypovolemia vs others. Pt denied any ibuprofen or other NSAID's Use.  - will refer to transplant when eGFR 21-22 , will check cystatin C to compare.  - Proteinuria- he was noted to 1.9 g of albumin, up to 3.5 grams of protein also noted, today is back at 1 gram.  Not sure if he was ill or had other factors. Discussed option for ACE-I or SGLT2 inh and he will discuss this with his transplant team and let me know.      Lower urinary tract symptoms   Bladder wall thickness increased  -not addressed today    Hypertension :  BP been stable during clinic visits (in care every where). Was on losartan but discontinued because of hypotension. Not on meds now.  - continue to monitor, given proteinuria, will consider low dose lisinopril or SGLT2 inhibitor    Episode of sweating- check A1C; has had some elevated glucose (usually not fasting though)    Electrolytes :  Hyperkalemia, mild, improved on today's labs  Noted  potassium of 5.3 likely 2/2 CKD. Continue to monitor for now with increase hydration, continuing bicarb supplementation.    BMD :  Daniel was low,  Low vitamin D- will start cholecalciferal  Secondary hyperparathyroidism   PTH is elevated likely 2/2 CKD.  - cholecalciferol 1000mg daily, repeat in 6 months  His previous Vit D is low, repeat today is still pending. If low will restart him on daily vs weekly supplementation.    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  Was on CNI, now on MMF alone    Total time spent on the day of clinic visit was >40 min including face to face time with patient, labs and image review in care every where, previous nephrology note in care every where, documentation as above.      Followup, CKD stage IV, post liver transplant    HPI: Matthew Arias is a 37 year old male with PMH significant for biliary atresia s/p 2 liver transplants, CKD, seizures who presents for evaluation and management of CKD.     Pt presented to nephrology to establish care for his CKD. He was first notified about CKD was after his second transplant in 2000. Since that time he was known to have CKD that was slowly progressing. His transplant was done in [place], so following with nephrology there, but he moved to [place], he was instructed to find nephrology closer. Later he was seen in [place], last visit was in dec 2022. But his insurance changed, so established care with U or M.  No new changes in his meds recently. Endorsed that with his first liver he had multiple rejection episodes but with second one, he is been more stable. He works full time at  but now off work due to breaking his finger.    We discussed his protein in the urine, which improved but  still at 1 gram.  We discussed trying ACE-I or SGLT2 inhibitor for this and kidney protection and he is willing and will check in with transplant team.       Allergies   Allergen Reactions     Levofloxacin      Other Reaction(s): Renal Failure    Hx of renal failure     Amoxicillin-Pot Clavulanate Rash     Patient reported this was a couple years ago and he did not need treatment for the rash. Just stopped taking the medication.     Bee Pollen Itching     Allergic Rhinitis, Itchy watery eyes     Seasonal Allergies Itching     Allergic Rhinitis, Itchy watery eyes     Cefprozil GI Disturbance     Other Reaction(s): GI Upset     Clavulanic Acid Unknown     Other Reaction(s): Other (see comments)     Erythromycin      Other Reaction(s): GI UPSET, GI Upset     Gramineae Pollens Other (See Comments)     Sulfa Antibiotics      Other Reaction(s): *Unknown - Childhood Rxn     Macrolides And Ketolides Rash     Other Reaction(s): GI UPSET, UNKNOWN     Penicillins Rash       Current Outpatient Medications   Medication Sig Dispense Refill     acetaminophen (TYLENOL) 500 MG tablet Take 500 mg by mouth       buPROPion (WELLBUTRIN SR) 100 MG 12 hr tablet Take 150 mg by mouth       FLUoxetine (PROZAC) 20 MG capsule TAKE 1 CAPSULE(20 MG) BY MOUTH EVERY MORNING       levETIRAcetam (KEPPRA) 500 MG tablet Take 500 mg by mouth every 12 hours       mycophenolate (GENERIC EQUIVALENT) 250 MG capsule Take 2 capsules by mouth every 12 hours       omeprazole (PRILOSEC) 40 MG DR capsule Take 1 capsule by mouth every 12 hours       predniSONE (DELTASONE) 5 MG tablet Take 10 mg by mouth daily       sodium bicarbonate 325 MG tablet        No current facility-administered medications for this visit.       No past medical history on file.    No past surgical history on file.    Social History     Tobacco Use     Smoking status: Never     Smokeless tobacco: Never   Vaping Use     Vaping status: Never Used   Substance Use Topics     Drug use: Never        No family history on file.    ROS: A 12 system review of systems was negative other than noted here or above.     Exam:  /71   Pulse 72   Temp 97.5  F (36.4  C) (Oral)   Wt 67.6 kg (149 lb)   SpO2 100%     GENERAL APPEARANCE: alert and no distress  EYES:  no scleral icterus  HENT: no gross abnormalities noted  RESP: able to speak in full sentences, no audible wheezing or no accessory muscle usage.   CV: denied edema  SKIN: no visible facial rash  NEURO: mentation intact and speech normal  PSYCH: affect normal/bright    Results:    No visits with results within 1 Day(s) from this visit.   Latest known visit with results is:   No results found for any previous visit.       Leighann Carmona MD  Associate Professor of Medicine  Department of Nephrology  [facility]            Again, thank you for allowing me to participate in the care of your patient.      Sincerely,    Leighann Carmona MD

## 2024-10-16 NOTE — NURSING NOTE
Chief Complaint   Patient presents with    RECHECK       Vitals:    10/16/24 0934 10/16/24 0944 10/16/24 0945   BP: 124/73 127/64 130/71   Pulse: 72     Temp: 97.5  F (36.4  C)     TempSrc: Oral     SpO2: 100%     Weight: 67.6 kg (149 lb)         BP Readings from Last 3 Encounters:   10/16/24 130/71   01/05/24 116/68       /71   Pulse 72   Temp 97.5  F (36.4  C) (Oral)   Wt 67.6 kg (149 lb)   SpO2 100%      Alejandro Clifton

## 2024-10-16 NOTE — PATIENT INSTRUCTIONS
Lisinopril  or Empagliflozin  for kidney protection    Diabetic education for checking blood sugar

## 2024-10-16 NOTE — PROGRESS NOTES
Nephrology Clinic Note      Assessment/Plan:     CKD stage 4- Scr fluctuates between 2.6-3s eGFR was 28ml/min by cystatin C in 2022   Recurrent TRISTON episodes  B/l multiple renal cysts  Pt with baseline creatinine of 2.2 to 2.8 since 2017 with eGFR on 30 and 40's which put him in CKD stage IIIb. Creatinine can up trend to 3's with eGFR in 20's but improves to eGFR of 30's. UA was bland with out hematuria. Noted to have proteinuria but repeat was WNL. CT abdomen in June 2022 showed multiple cysts like before which likely hemorrhagic but no nephrolithiasis or hydronephrosis noted. No genetic testing done.  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 Change in creatinine likely 2/2 hypovolemia vs others. Pt denied any ibuprofen or other NSAID's Use.  - will refer to transplant when eGFR 21-22 , will check cystatin C to compare.  - Proteinuria- he was noted to 1.9 g of albumin, up to 3.5 grams of protein also noted, today is back at 1 gram.  Not sure if he was ill or had other factors. Discussed option for ACE-I or SGLT2 inh and he will discuss this with his transplant team and let me know.      Lower urinary tract symptoms   Bladder wall thickness increased  -not addressed today    Hypertension :  BP been stable during clinic visits (in care every where). Was on losartan but discontinued because of hypotension. Not on meds now.  - continue to monitor, given proteinuria, will consider low dose lisinopril or SGLT2 inhibitor    Episode of sweating- check A1C; has had some elevated glucose (usually not fasting though)    Electrolytes :  Hyperkalemia, mild, improved on today's labs  Noted potassium of 5.3 likely 2/2 CKD. Continue to monitor for now with increase hydration, continuing bicarb supplementation.    BMD :  Daniel was low,  Low vitamin D- will start cholecalciferal  Secondary hyperparathyroidism   PTH is elevated likely 2/2 CKD.  - cholecalciferol 1000mg daily, repeat in 6 months  His previous Vit  D is low, repeat today is still pending. If low will restart him on daily vs weekly supplementation.    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  Was on CNI, now on MMF alone    Total time spent on the day of clinic visit was >40 min including face to face time with patient, labs and image review in care every where, previous nephrology note in care every where, documentation as above.      Followup, CKD stage IV, post liver transplant    HPI: Matthew Arias is a 37 year old male with PMH significant for biliary atresia s/p 2 liver transplants, CKD, seizures who presents for evaluation and management of CKD.     Pt presented to nephrology to establish care for his CKD. He was first notified about CKD was after his second transplant in 2000. Since that time he was known to have CKD that was slowly progressing. His transplant was done in [place], so following with nephrology there, but he moved to [place], he was instructed to find nephrology closer. Later he was seen in [place], last visit was in dec 2022. But his insurance changed, so established care with U or M.  No new changes in his meds recently. Endorsed that with his first liver he had multiple rejection episodes but with second one, he is been more stable. He works full time at  but now off work due to breaking his finger.    We discussed his protein in the urine, which improved but still at 1 gram.  We discussed trying ACE-I or SGLT2 inhibitor for this and kidney protection and he is willing and will check in with transplant team.       Allergies   Allergen Reactions    Levofloxacin      Other Reaction(s): Renal Failure    Hx of renal failure    Amoxicillin-Pot Clavulanate Rash     Patient reported  this was a couple years ago and he did not need treatment for the rash. Just stopped taking the medication.    Bee Pollen Itching     Allergic Rhinitis, Itchy watery eyes    Seasonal Allergies Itching     Allergic Rhinitis, Itchy watery eyes    Cefprozil GI Disturbance     Other Reaction(s): GI Upset    Clavulanic Acid Unknown     Other Reaction(s): Other (see comments)    Erythromycin      Other Reaction(s): GI UPSET, GI Upset    Gramineae Pollens Other (See Comments)    Sulfa Antibiotics      Other Reaction(s): *Unknown - Childhood Rxn    Macrolides And Ketolides Rash     Other Reaction(s): GI UPSET, UNKNOWN    Penicillins Rash       Current Outpatient Medications   Medication Sig Dispense Refill    acetaminophen (TYLENOL) 500 MG tablet Take 500 mg by mouth      buPROPion (WELLBUTRIN SR) 100 MG 12 hr tablet Take 150 mg by mouth      FLUoxetine (PROZAC) 20 MG capsule TAKE 1 CAPSULE(20 MG) BY MOUTH EVERY MORNING      levETIRAcetam (KEPPRA) 500 MG tablet Take 500 mg by mouth every 12 hours      mycophenolate (GENERIC EQUIVALENT) 250 MG capsule Take 2 capsules by mouth every 12 hours      omeprazole (PRILOSEC) 40 MG DR capsule Take 1 capsule by mouth every 12 hours      predniSONE (DELTASONE) 5 MG tablet Take 10 mg by mouth daily      sodium bicarbonate 325 MG tablet        No current facility-administered medications for this visit.       No past medical history on file.    No past surgical history on file.    Social History     Tobacco Use    Smoking status: Never    Smokeless tobacco: Never   Vaping Use    Vaping status: Never Used   Substance Use Topics    Drug use: Never       No family history on file.    ROS: A 12 system review of systems was negative other than noted here or above.     Exam:  /71   Pulse 72   Temp 97.5  F (36.4  C) (Oral)   Wt 67.6 kg (149 lb)   SpO2 100%     GENERAL APPEARANCE: alert and no distress  EYES:  no scleral icterus  HENT: no gross abnormalities noted  RESP: able to speak  in full sentences, no audible wheezing or no accessory muscle usage.   CV: denied edema  SKIN: no visible facial rash  NEURO: mentation intact and speech normal  PSYCH: affect normal/bright    Results:    No visits with results within 1 Day(s) from this visit.   Latest known visit with results is:   No results found for any previous visit.       Leighann Carmona MD  Associate Professor of Medicine  Department of Nephrology  [facility]

## 2024-11-12 ENCOUNTER — TRANSFERRED RECORDS (OUTPATIENT)
Dept: HEALTH INFORMATION MANAGEMENT | Facility: CLINIC | Age: 39
End: 2024-11-12

## 2024-12-26 ENCOUNTER — TRANSFERRED RECORDS (OUTPATIENT)
Dept: HEALTH INFORMATION MANAGEMENT | Facility: CLINIC | Age: 39
End: 2024-12-26

## 2025-01-15 ENCOUNTER — PATIENT OUTREACH (OUTPATIENT)
Dept: NEPHROLOGY | Facility: CLINIC | Age: 40
End: 2025-01-15
Payer: COMMERCIAL

## 2025-01-15 DIAGNOSIS — N18.4 CKD (CHRONIC KIDNEY DISEASE) STAGE 4, GFR 15-29 ML/MIN (H): Primary | ICD-10-CM

## 2025-02-10 ENCOUNTER — MYC MEDICAL ADVICE (OUTPATIENT)
Dept: NEPHROLOGY | Facility: CLINIC | Age: 40
End: 2025-02-10
Payer: COMMERCIAL

## 2025-02-10 DIAGNOSIS — N18.4 CKD (CHRONIC KIDNEY DISEASE) STAGE 4, GFR 15-29 ML/MIN (H): Primary | ICD-10-CM

## 2025-02-10 DIAGNOSIS — N18.32 ANEMIA IN STAGE 3B CHRONIC KIDNEY DISEASE (H): ICD-10-CM

## 2025-02-10 DIAGNOSIS — D63.1 ANEMIA IN STAGE 3B CHRONIC KIDNEY DISEASE (H): ICD-10-CM

## 2025-02-26 ENCOUNTER — OFFICE VISIT (OUTPATIENT)
Dept: NEPHROLOGY | Facility: CLINIC | Age: 40
End: 2025-02-26
Payer: COMMERCIAL

## 2025-02-26 ENCOUNTER — REFERRAL (OUTPATIENT)
Dept: TRANSPLANT | Facility: CLINIC | Age: 40
End: 2025-02-26

## 2025-02-26 VITALS
SYSTOLIC BLOOD PRESSURE: 148 MMHG | DIASTOLIC BLOOD PRESSURE: 79 MMHG | OXYGEN SATURATION: 99 % | HEART RATE: 96 BPM | WEIGHT: 136.4 LBS

## 2025-02-26 DIAGNOSIS — N18.4 CKD (CHRONIC KIDNEY DISEASE) STAGE 4, GFR 15-29 ML/MIN (H): Primary | ICD-10-CM

## 2025-02-26 DIAGNOSIS — R80.1 PERSISTENT PROTEINURIA: ICD-10-CM

## 2025-02-26 DIAGNOSIS — Z94.4 STATUS POST LIVER TRANSPLANT (H): ICD-10-CM

## 2025-02-26 DIAGNOSIS — N18.4 CHRONIC KIDNEY DISEASE, STAGE IV (SEVERE) (H): ICD-10-CM

## 2025-02-26 DIAGNOSIS — Z79.52 LONG TERM (CURRENT) USE OF SYSTEMIC STEROIDS: ICD-10-CM

## 2025-02-26 DIAGNOSIS — N28.1 MULTIPLE ACQUIRED CYSTS OF KIDNEY: Primary | ICD-10-CM

## 2025-02-26 DIAGNOSIS — D84.9 IMMUNOSUPPRESSION: ICD-10-CM

## 2025-02-26 DIAGNOSIS — Z94.4 HISTORY OF LIVER TRANSPLANT (H): ICD-10-CM

## 2025-02-26 DIAGNOSIS — D64.9 ANEMIA: ICD-10-CM

## 2025-02-26 DIAGNOSIS — Z01.818 PRE-TRANSPLANT EVALUATION FOR KIDNEY TRANSPLANT: ICD-10-CM

## 2025-02-26 DIAGNOSIS — I60.9: ICD-10-CM

## 2025-02-26 DIAGNOSIS — N28.1 BILATERAL RENAL CYSTS: ICD-10-CM

## 2025-02-26 PROBLEM — Z90.81 ACQUIRED ABSENCE OF SPLEEN: Status: ACTIVE | Noted: 2018-12-05

## 2025-02-26 PROBLEM — D75.838 REACTIVE THROMBOCYTOSIS: Status: ACTIVE | Noted: 2017-02-20

## 2025-02-26 PROBLEM — E72.20 HYPERAMMONEMIA: Status: ACTIVE | Noted: 2018-06-21

## 2025-02-26 PROBLEM — D13.1 BENIGN NEOPLASM OF STOMACH: Status: ACTIVE | Noted: 2024-02-14

## 2025-02-26 PROBLEM — F33.40 RECURRENT MAJOR DEPRESSIVE DISORDER, IN REMISSION: Status: ACTIVE | Noted: 2018-06-21

## 2025-02-26 PROBLEM — L03.119 CELLULITIS OF LOWER EXTREMITY: Status: ACTIVE | Noted: 2023-06-28

## 2025-02-26 PROBLEM — K44.9 DIAPHRAGMATIC HERNIA: Status: ACTIVE | Noted: 2024-02-12

## 2025-02-26 PROBLEM — I67.1 CEREBRAL ANEURYSM WITHOUT RUPTURE: Status: ACTIVE | Noted: 2025-01-10

## 2025-02-26 PROBLEM — D72.829 LEUKOCYTOSIS: Status: ACTIVE | Noted: 2023-06-28

## 2025-02-26 PROBLEM — F41.1 GENERALIZED ANXIETY DISORDER: Status: ACTIVE | Noted: 2017-06-28

## 2025-02-26 PROBLEM — K31.7 POLYP OF DUODENUM: Status: ACTIVE | Noted: 2024-02-12

## 2025-02-26 PROBLEM — R56.9 SEIZURE (H): Status: ACTIVE | Noted: 2020-09-28

## 2025-02-26 PROBLEM — K22.711 BARRETT'S ESOPHAGUS WITH HIGH GRADE DYSPLASIA: Status: ACTIVE | Noted: 2021-07-15

## 2025-02-26 PROBLEM — Q44.2 CONGENITAL BILIARY ATRESIA (H): Status: ACTIVE | Noted: 2018-02-19

## 2025-02-26 PROBLEM — D84.821 IMMUNOCOMPROMISED DUE TO CORTICOSTEROIDS: Status: ACTIVE | Noted: 2023-02-12

## 2025-02-26 PROBLEM — U07.1 DISEASE DUE TO SEVERE ACUTE RESPIRATORY SYNDROME CORONAVIRUS 2 (SARS-COV-2): Status: ACTIVE | Noted: 2021-08-15

## 2025-02-26 PROBLEM — R13.10 DYSPHAGIA: Status: ACTIVE | Noted: 2021-07-15

## 2025-02-26 PROBLEM — A68.9 RELAPSING FEVER: Status: ACTIVE | Noted: 2018-12-04

## 2025-02-26 PROBLEM — R11.10 VOMITING: Status: ACTIVE | Noted: 2024-11-12

## 2025-02-26 PROBLEM — T38.0X5A IMMUNOCOMPROMISED DUE TO CORTICOSTEROIDS: Status: ACTIVE | Noted: 2023-02-12

## 2025-02-26 PROBLEM — Z86.16 HISTORY OF SEVERE ACUTE RESPIRATORY SYNDROME CORONAVIRUS 2 (SARS-COV-2) DISEASE: Status: ACTIVE | Noted: 2021-08-16

## 2025-02-26 PROBLEM — K21.9 GASTROESOPHAGEAL REFLUX DISEASE WITHOUT ESOPHAGITIS: Status: ACTIVE | Noted: 2021-07-15

## 2025-02-26 PROBLEM — K20.0 EOSINOPHILIC ESOPHAGITIS: Status: ACTIVE | Noted: 2023-01-12

## 2025-02-26 PROBLEM — A68.1: Status: ACTIVE | Noted: 2018-12-04

## 2025-02-26 PROCEDURE — 1126F AMNT PAIN NOTED NONE PRSNT: CPT | Performed by: INTERNAL MEDICINE

## 2025-02-26 PROCEDURE — 3077F SYST BP >= 140 MM HG: CPT | Performed by: INTERNAL MEDICINE

## 2025-02-26 PROCEDURE — 99215 OFFICE O/P EST HI 40 MIN: CPT | Performed by: INTERNAL MEDICINE

## 2025-02-26 PROCEDURE — G2211 COMPLEX E/M VISIT ADD ON: HCPCS | Performed by: INTERNAL MEDICINE

## 2025-02-26 PROCEDURE — 3078F DIAST BP <80 MM HG: CPT | Performed by: INTERNAL MEDICINE

## 2025-02-26 RX ORDER — SODIUM BICARBONATE 650 MG/1
650 TABLET ORAL 2 TIMES DAILY
Qty: 180 TABLET | Refills: 3 | Status: SHIPPED | OUTPATIENT
Start: 2025-02-26

## 2025-02-26 RX ORDER — SODIUM BICARBONATE 325 MG/1
650 TABLET ORAL 2 TIMES DAILY
Qty: 180 TABLET | Refills: 3 | Status: SHIPPED | OUTPATIENT
Start: 2025-02-26 | End: 2025-02-26

## 2025-02-26 ASSESSMENT — PAIN SCALES - GENERAL: PAINLEVEL_OUTOF10: NO PAIN (0)

## 2025-02-26 NOTE — LETTER
Matthew Jacobo [facility]  [address] [facility] [address]                March 14, 2025      Micah Castro,     It was a pleasure to speak with you over the phone last week in preparation of your pre-kidney transplant evaluation. I am sending this email to review the pre-transplant information we covered.     Please see your schedule in your My Chart for your pre-kidney transplant evaluation on 05/29/2025 starting at 7:30 AM. All your appointments will be at the:     [facility]  [address] [place] [address]    For parking options, please park in the open lot across the street from the front door of our Clinic.  Otherwise, enter the Clinic and Surgery Center / arrival plaza from [facility] and attendants can assist you based on your needs.  parking is available for those with limited mobility M-F from 7:00 am to 5:00 pm.     All in-person provider appointments will be on the third floor, 3A. Lab, EKG, and chest x-ray will be on the 1st floor. Echocardiogram will be on the 3rd floor. There are help desks in the clinic that can provide additional information, if you should need assistance.    Please bring your designated care person(s) to your appointment day to help listen to the patient education and ask questions that are important to you. You can eat/drink normally on this day. Please do not fast, as the appointment day will most likely go until 3 PM. There is a coffee shop on street level for you to purchase food and you can also bring food from home. Please be aware there are no microwaves or refrigerators for patient use at the clinic. Also, take all your prescribed medications as ordered on this day. There are no medication lab tests ordered during your appointments.    Upon completion of your appointments, I will compile the outcomes and have your results reviewed at the Transplant Team Selection Committee on Wednesday, 06/04/2025, of the following  week. This is a medical review meeting only, you will not be asked to attend. I will call you within 10 business days after this meeting to inform you of the outcomes and to assist in planning for the completion of your evaluation. I will also send you a transplant evaluation summary letter after our call.     You will receive an email from our Transplant Office which contains a Receipt of Information consent and patient educational materials. Please read and electronically sign the Receipt of Information consent as well as read the educational materials prior to your evaluation appointments on 05/29/2025.    Your virtual education class is scheduled for 4/07/2025 at 8:00 am. I have included the link here to the same video set you will be watching in class for your reference. To access click on this link and select adult/ kidney recipient/ English: [email or web address].    Education must be completed prior to activation on the transplant list.     Additional transplant resources are as follows:   www.unos.org. UNOS, or United Network of Organ Sharing, is the national organization in our country that maintains all the organ wait lists and is responsible for the rules and regulations for organ allocation. I recommend looking at the Transplant Living section as this area has content created for patients.   www.srtr.org SRTR, or the Scientific Registry for Transplant Recipients is a national data base that all Transplant Centers report their success and failure rate for all organ types twice per year. The results are public knowledge and do provide a good perspective of organ transplant.     If the transplant providers tell you at your appointments that you should start to have live donors register with our Program to initiate their evaluations, please provide this registration website: [email or web address]   The donor will receive a detailed email response  back with information and next steps specific to their situation. It is important that the donor responds to the email in order to move forward with their evaluation. Donors can also call our Office and ask to speak with a live donor coordinator in the event of questions at [phone].     Please let me know of any questions or concerns.     Thank you,  Do Marlow, RN, BSN  Pre Kidney Pancreas Transplant Coordinator   [facility]  [place] [address]  [email or web address]   Office Number: [phone] Direct Number: [phone]   Fax Number: [phone]  Employed by [employer]     CC's: Dr. Leighann Sung

## 2025-02-26 NOTE — NURSING NOTE
Chief Complaint   Patient presents with    RECHECK     CKD (chronic kidney disease) stage 4, GFR 15-29 ml/min (H) +4 more       Vitals:    02/26/25 0811 02/26/25 0815   BP: (!) 157/75 (!) 148/79   BP Location: Left arm Left arm   Patient Position: Sitting Sitting   Cuff Size: Adult Regular Adult Regular   Pulse: 96 96   SpO2: (!) 80% 99%   Weight: 61.9 kg (136 lb 6.4 oz)        There is no height or weight on file to calculate BMI.    Genie Jessica, [facility]

## 2025-02-26 NOTE — LETTER
February 28, 2025    Matthew Arias  [address]  [place] [address]    Dear Matthew,    Thank you for your interest in the Transplant Center at [facility]. We look forward to being a part of your care team and assisting you through the transplant process.    As we discussed, your transplant coordinator is Do Marlow (Kidney).  You may call your coordinator at any time with questions or concerns.  Your first scheduled call will be on 3/14/25 between 8:00 am-12:00 pm.  If this needs to change, call [phone].    Please complete the following.    Fill out and return the enclosed forms  Authorization for Electronic Communication  Authorization to Discuss Protected Health Information  Authorization for Release of Protected Health Information    Sign up for:  [employer], access to your electronic medical record (see enclosed pamphlet)  [email or web address], a transplant education website    You can use these tools to learn more about your transplant, communicate with your care team, and track your medical details       My Transplant Place    Sincerely,    [facility]  Solid Organ Transplant Care Programs  [phone], Option 5    cc: Referring Physician

## 2025-02-26 NOTE — PROGRESS NOTES
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  His Scr has been 2-2.5, now up to 3 range, eGFR 22-25 ml/min    CKD stage 4- Scr fluctuates between 2.6-3s eGFR was 28ml/min by cystatin C in 2022 , it was 2.7 eGFR 23 in October 2023 a little lower than creatinine of 2.8 with eGFR which was 28  Recurrent TRISTON episodes  B/l multiple renal cysts  Pt with baseline creatinine of 2.2 to 2.8 since 2017 with eGFR on 30 and 40's which put him in CKD stage 3b. Creatinine can up trend to 3's with eGFR in 20's but improves to eGFR of 30's. UA was bland without hematuria. Noted to have proteinuria but repeat was WNL. CT abdomen in June 2022 showed multiple cysts likely hemorrhagic but  stable with no nephrolithiasis or hydronephrosis noted. No genetic testing done.  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 Change in creatinine likely 2/2 hypovolemia and recently start of SGLT2 inhibitor for kidney protection . Pt denied any ibuprofen or other NSAID's Use.  - will refer to transplant given GFR 20-21 at this time.  - Proteinuria- he was noted to have 1.9 g of albuminuria, up to 3.5 grams of protein, then  back to 1 g/g cr.  Not sure if he was ill or had other factors. Discussed option for ACE-I or SGLT2 inh and discussed this and we started SGLT2 inhibitor October 2024      Lower urinary tract symptoms   Bladder wall thickness increased  -not addressed today    Hypertension :  BP been stable during clinic visits (in care every where). Was on losartan but discontinued because of hypotension. Not on BP meds now.  - continue to monitor, given proteinuria, will consider low dose lisinopril or amlodipine.    Electrolytes :  Hyperkalemia, mild, improved on today's labs  Noted potassium of 5.3 likely 2/2 CKD. Continue to monitor for now with increase hydration,  continuing bicarb supplementation.    BMD :  Daniel was low,  Low vitamin D-  started cholecalciferal  Secondary hyperparathyroidism   PTH is elevated likely 2/2 CKD.  - cholecalciferol 1000mg daily, repeat in 6 months  His previous Vit D is low, repeat today is still pending. If low will restart him on daily vs weekly supplementation.    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  Was on CNI, now on MMF and pred 5mg    Total time spent on the day of clinic visit was >40 min including face to face time with patient, labs and image review in care every where, reviewing previous nephrology note in care every where, documentation as above. Also discussed his need for disability given unable to complete work due to fatigue.          Reason for visit: Followup CKD stage 4, post liver transplant    HPI: Matthew Arias is a 39 year old male with PMH significant for biliary atresia s/p 2 liver transplants, CKD, seizures who presents for followup of CKD.     Pt presented to nephrology to establish care for his CKD. He was first aware of CKD after his second transplant in 2001 Since that time he was known to have CKD that was slowly progressing. His transplant was done in [place], so he wasfollowing with nephrology there, but he moved to [place], he was recommend to establish nephrology care closer to him. He still see transplant team once a year in [place].  At one point he was seen in [place], last visit was in Dec 2022, but his insurance changed, thus established care with [facility] or [facility].  No new changes in his meds recently. Endorsed that with his first liver he had multiple rejection episodes but with second one, he has been more stable. He works full time at  but now off  work / on disability. He was out for a while last year after breaking his finger.  He is applying for disability and has to appeal a denial.    We discussed his protein in the urine, and we started SGLT2 inhibitor last visit, in October 2024, for kidney protection; his transplant team was ok with this.    He was hospitalized in fall of 2025for a few days with likely GI illness/ bug.    He has been not working since mid January 2025 and is on short term disability, but then was denied short term disability so appealing.  He is fatigued and cannot do the full shift or the pace required for his job.  He has teenagers at home and is busy with them       Allergies   Allergen Reactions    Levofloxacin      Other Reaction(s): Renal Failure    Hx of renal failure    Amoxicillin-Pot Clavulanate Rash     Patient reported this was a couple years ago and he did not need treatment for the rash. Just stopped taking the medication.    Bee Pollen Itching     Allergic Rhinitis, Itchy watery eyes    Seasonal Allergies Itching     Allergic Rhinitis, Itchy watery eyes    Cefprozil GI Disturbance     Other Reaction(s): GI Upset    Clavulanic Acid Unknown     Other Reaction(s): Other (see comments)    Erythromycin      Other Reaction(s): GI UPSET, GI Upset    Gramineae Pollens Other (See Comments)    Sulfa Antibiotics      Other Reaction(s): *Unknown - Childhood Rxn    Macrolides And Ketolides Rash     Other Reaction(s): GI UPSET, UNKNOWN    Penicillins Rash       Current Outpatient Medications   Medication Sig Dispense Refill    acetaminophen (TYLENOL) 500 MG tablet Take 500 mg by mouth      buPROPion (WELLBUTRIN SR) 100 MG 12 hr tablet Take 150 mg by mouth      empagliflozin (JARDIANCE) 10 MG TABS tablet Take 1 tablet (10 mg) by mouth daily. 90 tablet 0    FLUoxetine (PROZAC) 20 MG capsule TAKE 1 CAPSULE(20 MG) BY MOUTH EVERY MORNING      levETIRAcetam (KEPPRA) 500 MG tablet Take 500 mg by mouth every 12 hours      mycophenolate  (GENERIC EQUIVALENT) 250 MG capsule Take 2 capsules by mouth every 12 hours      omeprazole (PRILOSEC) 40 MG DR capsule Take 1 capsule by mouth every 12 hours      predniSONE (DELTASONE) 5 MG tablet Take 10 mg by mouth daily      sodium bicarbonate 325 MG tablet       Vitamin D3 (CHOLECALCIFEROL) 25 mcg (1000 units) tablet Take 1 tablet (25 mcg) by mouth daily. 90 tablet 3     No current facility-administered medications for this visit.       No past medical history on file.  Biliary atresia    No past surgical history on file.  Liver transplant 1986 and 2000    Social History     Tobacco Use    Smoking status: Never    Smokeless tobacco: Never   Vaping Use    Vaping status: Never Used   Substance Use Topics    Drug use: Never       No family history on file.    ROS: A 12 system review of systems was negative other than noted here or above.     Exam:  There were no vitals taken for this visit.    GENERAL APPEARANCE: alert and no distress  EYES:  no scleral icterus  HENT: no gross abnormalities noted  RESP: able to speak in full sentences, no audible wheezing or no accessory muscle usage.   CV: denied edema  SKIN: no visible facial rash  NEURO: mentation intact and speech normal  PSYCH: affect normal/bright    Results:        Last Comprehensive Metabolic Panel:  Lab Results   Component Value Date     10/16/2024    POTASSIUM 4.0 10/16/2024    CHLORIDE 106 10/16/2024    CO2 22 10/16/2024    ANIONGAP 9 10/16/2024    GLC 80 10/16/2024    BUN 31.4 (H) 10/16/2024    CR 2.83 (H) 10/16/2024    GFRESTIMATED 28 (L) 10/16/2024    MYRNA 9.1 10/16/2024           Leighann Carmona MD  Associate Professor of Medicine  Department of Nephrology  [facility]

## 2025-02-26 NOTE — LETTER
2025    Matthew Arias   1985        To Whom it May Concern;      Matthew Arias has been our patient in the nephrology clinic.  He has history of biliary atresisa and has undergone two liver transplants,in  and  in [facility].  He has chronic kidney disease, CKD, stage 4,  due to calcieurin toxicity on biopsy, and acute insults during illness..  His Scr has been 2-2.5, now up to 3 range, eGFR 22-25 ml/min.  His cystatin C (another way to estimate kidney function) shows a kidney function near 20 percent.  He will be referred for kidney transplant evaluation.  He has been experiencing significant fatigue and thus unable to work at this time.  He has anemia, is on immune suppression for his liver transplant, and has had a few infections in recent months due to his immune suppressed burns causing more acute illness and exacerbation of his kidney disease. He was hospitalized in 2024 with an illness and a few emergency room visits needing intravenous fluids.    He is seeking short and long term disability given his inability to perform his duties at work given his fatigue and inability to work at the pace required.  We will continue to support him and monitor his kidney function closely, and manage the complications of kidney disease including anemia, metabolic acidosis, hypertension and hyperparathyroidism.      Sincerely,        Leighann Carmona MD

## 2025-02-26 NOTE — PATIENT INSTRUCTIONS
3 months with Dee Dee Nesbitt, 6 months with Mathew    Send me blood pressure readings in the next few weeks    Normal blood pressure 120/80, if higher than 130-135, would consider blood pressure medication

## 2025-02-27 ENCOUNTER — TRANSFERRED RECORDS (OUTPATIENT)
Dept: HEALTH INFORMATION MANAGEMENT | Facility: CLINIC | Age: 40
End: 2025-02-27
Payer: COMMERCIAL

## 2025-02-27 NOTE — TELEPHONE ENCOUNTER
SOT KIDNEY INTAKE   Intake completed with Patient  February 27, 2025    CARE TEAM    PCP: Dr. Rip Huang   Referring Organization: [facility]  Referring Provider: Leighann Anderson   Referring Diagnosis: CKD Stage 4    Insurance: [facility] out of state ID:  [id number] group: [id number]     SCREENING     GFR/Date: <25 done 2/13/25 Sal CareEverywhere    Is patient diabetic? No (if not diabetic go to next section)  Is patient on insulin? No  Is patient under the age of 65? Yes  Was patient offered a pancreas transplant referral? No    Previous transplants 2 liver transplants. 1st in 1986 and 2nd 12/25/2001 [facility]  Cancer history: No  Cardiac history: No  Biopsy None Recent Only with previous Liver transplant 1986 and 2001  Oxygen use at rest: No with activity: No    BMI: 18.69 (BMI> 40 - RNCC call only/ no PKE date)      Is patient in a group home/assisted living? No  Does patient have a guardian? No    WRAP UP  Referral intake process completed.  Patient is aware that after financial approval is received, medical records will be requested.   Patient confirmed for a callback from transplant coordinator on 3/14/25. (within 2 weeks)  Tentative evaluation date 5/29/25 slot 4 .    Confirmed coordinator will discuss evaluation process in more detail at the time of their call.   Patient is aware of the need to arrange age appropriate cancer screening, vaccinations, and dental care.  Reminded patient to complete questionnaire, complete medical records release, and review packet prior to evaluation visit .  Assessed patient for special needs (ie-wheelchair, assistance, guardian, and ):  None   Patient instructed to call [phone] with questions.     Patient gave verbal consent during intake call to obtain medical records and documents outside of [facility]:  Yes  Patient agrees to Docusign Yes

## 2025-02-28 VITALS — HEIGHT: 71 IN | WEIGHT: 134 LBS | BODY MASS INDEX: 18.76 KG/M2

## 2025-03-14 NOTE — TELEPHONE ENCOUNTER
Reviewed pt's chart for pre-kidney transplant evaluation planning. Coordinator first call on 03/14/2025. PKE STD on 05/29/2025 slot 4.      services required: no Is pt able to attend virtual education class? yes    Pt had a kidney biopsy,  05/18/2011 at  - see [facility]  FINAL DIAGNOSIS:   Renal (Native):  Biopsy:   Mild arterionephrosclerosis     COMMENT:   In addition to focal glomerulosclerosis, there is also patchy, mild   interstitial fibrosis and tubular atrophy, which can be secondary to   arteriosclerosis and/or chronic calcineurin inhibitor toxicity.       Does not have a qualifying GFR. Lowest GFR is 23 on 11/25/2024  Pt is not on dialysis,  Pt is not diabetic.     Health hx: .S/P liver transplant for biliary atresia in 1986 and a retransplant in 2001 because of cirrhosis. ( Both done at [facility] ) S/P - last seen by hepatology at  04/15/2024 - excellent graft function with immuno at this visit of tacrolimus and prednisone. Pt is currently on mycophenoate and prednisone: Per Dr. 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 CT abd pelvic June 2022 showed multiple cysts likely hemorrhagic. CT abd pelvic 12/26/2024 shows multiple bilateral hemorrhagic cysts and several bilateral simple renal cysts. Has had c diff 3 times, last time was 2017.   Heart hx: none chronic    Lung hx: none chronic .   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 Does not have current infection, does not non-healing wounds, or active cancer.    Health maintenance items: BMI 18.69 on 02/26/2025.  Colonoscopy: 2016 at  Dental: UTD   Vaccinations: due for COVID and  possible      Pt is independent w/ ADLs. Pt sets up his own medications, pt reports he always takes medications as prescribed.  Pt lives in house w/ wife and his 4 children ages 10-18 years old.  Wife and his mother support following transplant. Pt ron not know of living donors at this time. .     Imaging Available: CT abdom pelvic wo contrast at [facility] - requested     Contacted patient and introduced myself as their Transplant Coordinator, also introduced the role of the Transplant Coordinator in the transplant process.  Explained the purpose of this call including reviewing next steps and answering questions.      Confirmed Referring Provider, Dr. Leighann Carmona; and Primary Care Physician Dr. Rip Lora. Explained to the patient of the importance of continued communication with referring providers and primary care physicians.      Reviewed components of transplant evaluation process including necessary appointments, tests, and procedures.  Instructed pt to bring 1-2 people with them to eval and to eat and drink and normally on eval day    Answered questions for patient regarding evaluation, provided my name and contact information and requested they call/message with any additional questions or concerns.  Informed patient they will receive a letter with information discussed in referral call. Determined that patient would like information regarding transplant by:       Mail, Email, TVtriphart, and/or Phone Call.  Encouraged the use of ITS Compliance.    Pt scheduled for virtual tx class on 04/07/2025 at 8:00 am. . Link for educational videos were provided in my letter.  Additional informational web sites about transplant were discussed. Links provided to www.unos.org and www.srtr.org in letter sent to patient.  Pt expressed excellent understanding of all and was in excellent agreement with the plan.    Confirmed STD 05/29/2025 slot 4. Instructed pt to see appointment times for evaluation day in his My Chart. As well as,  receive an email from our Office prior to eval with a Receipt of Info and educational materials - instructed to read materials and sign consent prior to eval.     Smartset orders entered.     Generated pre transplant patient education letter in EPIC - electronically sent to pt/ providers.

## 2025-03-19 ENCOUNTER — TELEPHONE (OUTPATIENT)
Dept: TRANSPLANT | Facility: CLINIC | Age: 40
End: 2025-03-19
Payer: COMMERCIAL

## 2025-03-19 NOTE — TELEPHONE ENCOUNTER
Appointment Reschedule is approved by: Do Marlow    Patient Notified: Yes  Current Date Scheduled: 05/29/2025 Slot 4  New Date Scheduled: 03/24/2025 Slot 3  Reason for Reschedule: Patient Requesting an Earlier Appointment    Telephone Encounter Date for Scheduling Confirmation: 03/19/2025    Additional Comments: N/A

## 2025-03-20 NOTE — PROGRESS NOTES
"[facility] SOLID ORGAN TRANSPLANT  OUTPATIENT MNT: KIDNEY TRANSPLANT EVALUATION    Current BMI: 17.7 (HT 70.6 in,  lbs/57 kg)  BMI guideline for kidney transplant up to a BMI of 40 / per surgeon discretion     Frailty Assessment-- Not Frail (1/5 points)- low activity   Handgrip Strength: 32    Reference:  Score of 0-2 = Not Frail  Score of 3-5 = Frail      TIME SPENT: 15 minutes  VISIT TYPE: Initial   REFERRING PHYSICIAN: Lele   PT ACCOMPANIED BY: his wife     History of previous txp: liver 1986, 2001   Dialysis: no    NUTRITION ASSESSMENT  H/p Emanuel's, dysphagia (2021), gastritis  Lab Results   Component Value Date    A1C 5.8 10/16/2024     - Meal prep & grocery shopping: pt does   - Previous RD education: yes  - Appetite: varies day to day   - Food allergies/intolerances: no  - Issues chewing or swallowing: issues swallowing, unsure why, is being followed for this and has been practicing safe swallow practices; not on any modified texture diets   - N/V/D/C: no  - Food access concerns: no    Vitamins, Supplements, Pertinent Meds: vit D (when he remembers)  Herbal Medicines/Supplements: no  Protein Supplements: Ensure Max 30 g protein (a few times/week)- newer routine for pt- \"should be every day\"    Weight hx // fluid retention:   - no ALISSON  - wt overall stable within a range per pt  - in the past 5 years, he reports highest weight 138 lbs; lowest wt 122  - 1 outlier of 149 lbs (10/2024), otherwise wt fairly stable per below/CE records    3/2023-125 lbs  10/2023-125 lbs   4/2024-134 lbs  6/2024-132 lbs     PHYSICAL ACTIVITY   Walks the dogs 2x/week- at least 3 or more miles when he does walk   Energy level OK, not showering much due to low energy in general  Reports energy level and routine is overall changed since he stopped working 1/13/25     DIET RECALL  Breakfast Cereal w/ 2% + granola bar; eggs once in a while    Lunch Per snacks    Dinner Hamburger with corn; rotini with meat sauce w/ " "some bread; chicken   May not always have protein source, but \"could\"    Snacks PB crackers, pasta (with no sugar added spaghetti sauce), banana, cottage cheese, yogurt, cheese, rice or rice pudding     Beverages Mountain Dew (2/day), water, little juice, milk, Powerade/Gatorade zero (1/day)    *Reports doing some zero sugar items due to wife trying to lose weight, h/o wt loss surgery    Alcohol A few times/year    Dining out 2x/week      LABS  2/13/25 K 4.9- h/o some hyperK  No recent Phos on file    NUTRITION DIAGNOSIS   No nutrition diagnosis identified at this time     NUTRITION INTERVENTION   Nutrition education provided:  Discussed sodium intake (low sodium foods and drinks, seasoning food without salt and tips for low sodium diet).  Reviewed h/o wnl K levels lately, yet high K foods he is consuming that he may need to limit in the future (tomato products, bananas, sports drinks).   Reviewed goal to maintain vs gain some weight and expressed concern that if he were to get sick, he could easily lose weight. Encouraged trying to establish a new routine with eating since he is not working. Swap some products for higher calorie versions, such as whole milk, whole milk yogurt, etc.   Consider a higher calorie nutrition drink (current one is ~150 calories), with a lower protein content.     Reviewed post txp diet guidelines in brief (will review in further detail post txp):  (1) Review of proper food safety measures d/t immunosuppressant therapy post-op and increased risk for food-borne illness    (2) Avoid the following post txp d/t risk for rejection, unknown effects on the organs, and/or potential interactions with immunosuppressants:  - Herbal, Chinese, holistic, chiropractic, natural, alternative medicines and supplements  - Detoxes and cleanses  - Weight loss pills  - Protein powders or other products with extracts or herbs (ie green tea extract)    (3) Med regimen and possible side effects    Patient " Understanding: Pt verbalized understanding of education provided.  Expected Engagement: Good  Follow-Up Plans: PRN     NUTRITION GOALS   No nutrition goals identified at this time     Barbie Peterson, RD, LD, CCTD

## 2025-03-23 LAB
ABO + RH BLD: NORMAL
BLD GP AB SCN SERPL QL: NEGATIVE
SPECIMEN EXP DATE BLD: NORMAL

## 2025-03-24 ENCOUNTER — OFFICE VISIT (OUTPATIENT)
Dept: TRANSPLANT | Facility: CLINIC | Age: 40
End: 2025-03-24
Attending: NURSE PRACTITIONER
Payer: COMMERCIAL

## 2025-03-24 ENCOUNTER — LAB (OUTPATIENT)
Dept: LAB | Facility: CLINIC | Age: 40
End: 2025-03-24
Attending: NURSE PRACTITIONER
Payer: COMMERCIAL

## 2025-03-24 ENCOUNTER — ANCILLARY PROCEDURE (OUTPATIENT)
Dept: CARDIOLOGY | Facility: CLINIC | Age: 40
End: 2025-03-24
Attending: NURSE PRACTITIONER
Payer: COMMERCIAL

## 2025-03-24 ENCOUNTER — ALLIED HEALTH/NURSE VISIT (OUTPATIENT)
Dept: TRANSPLANT | Facility: CLINIC | Age: 40
End: 2025-03-24

## 2025-03-24 ENCOUNTER — ANCILLARY PROCEDURE (OUTPATIENT)
Dept: GENERAL RADIOLOGY | Facility: CLINIC | Age: 40
End: 2025-03-24
Attending: NURSE PRACTITIONER
Payer: COMMERCIAL

## 2025-03-24 VITALS
BODY MASS INDEX: 17.63 KG/M2 | OXYGEN SATURATION: 99 % | TEMPERATURE: 97.5 F | WEIGHT: 125.9 LBS | SYSTOLIC BLOOD PRESSURE: 149 MMHG | HEART RATE: 64 BPM | DIASTOLIC BLOOD PRESSURE: 76 MMHG | HEIGHT: 71 IN | RESPIRATION RATE: 16 BRPM

## 2025-03-24 VITALS
HEIGHT: 71 IN | WEIGHT: 125.9 LBS | HEART RATE: 64 BPM | TEMPERATURE: 97.5 F | SYSTOLIC BLOOD PRESSURE: 149 MMHG | BODY MASS INDEX: 17.63 KG/M2 | RESPIRATION RATE: 16 BRPM | OXYGEN SATURATION: 99 % | DIASTOLIC BLOOD PRESSURE: 76 MMHG

## 2025-03-24 DIAGNOSIS — Z01.818 PRE-TRANSPLANT EVALUATION FOR KIDNEY TRANSPLANT: Primary | ICD-10-CM

## 2025-03-24 DIAGNOSIS — Z01.818 PRE-TRANSPLANT EVALUATION FOR KIDNEY TRANSPLANT: ICD-10-CM

## 2025-03-24 DIAGNOSIS — Z94.4 LIVER TRANSPLANTED (H): ICD-10-CM

## 2025-03-24 DIAGNOSIS — N18.4 STAGE 4 CHRONIC KIDNEY DISEASE (H): Primary | ICD-10-CM

## 2025-03-24 DIAGNOSIS — Z79.52 LONG TERM (CURRENT) USE OF SYSTEMIC STEROIDS: ICD-10-CM

## 2025-03-24 DIAGNOSIS — D64.9 ANEMIA: ICD-10-CM

## 2025-03-24 DIAGNOSIS — N28.1 MULTIPLE ACQUIRED CYSTS OF KIDNEY: ICD-10-CM

## 2025-03-24 DIAGNOSIS — D84.9 IMMUNOSUPPRESSION: ICD-10-CM

## 2025-03-24 DIAGNOSIS — N18.4 CHRONIC KIDNEY DISEASE, STAGE IV (SEVERE) (H): ICD-10-CM

## 2025-03-24 DIAGNOSIS — N18.32 ANEMIA IN STAGE 3B CHRONIC KIDNEY DISEASE (H): ICD-10-CM

## 2025-03-24 DIAGNOSIS — Z94.4 STATUS POST LIVER TRANSPLANT (H): ICD-10-CM

## 2025-03-24 DIAGNOSIS — A04.5 CAMPYLOBACTER GASTROENTERITIS: ICD-10-CM

## 2025-03-24 DIAGNOSIS — Z76.82 ORGAN TRANSPLANT CANDIDATE: Primary | ICD-10-CM

## 2025-03-24 DIAGNOSIS — D63.1 ANEMIA IN STAGE 3B CHRONIC KIDNEY DISEASE (H): ICD-10-CM

## 2025-03-24 DIAGNOSIS — R63.6 UNDERWEIGHT: ICD-10-CM

## 2025-03-24 DIAGNOSIS — N18.4 CKD (CHRONIC KIDNEY DISEASE) STAGE 4, GFR 15-29 ML/MIN (H): ICD-10-CM

## 2025-03-24 DIAGNOSIS — G40.401 EPILEPTIC GRAND MAL STATUS (H): ICD-10-CM

## 2025-03-24 DIAGNOSIS — N18.4 STAGE 4 CHRONIC KIDNEY DISEASE (H): ICD-10-CM

## 2025-03-24 LAB
A1 AB TITR SERPL: 128 {TITER}
A1 AB TITR SERPL: 64 {TITER}
ABO + RH BLD: NORMAL
ALBUMIN MFR UR ELPH: 17.6 MG/DL
ALBUMIN SERPL BCG-MCNC: 4 G/DL (ref 3.5–5.2)
ALBUMIN UR-MCNC: 10 MG/DL
ALP SERPL-CCNC: 224 U/L (ref 40–150)
ALT SERPL W P-5'-P-CCNC: 42 U/L (ref 0–70)
ANION GAP SERPL CALCULATED.3IONS-SCNC: 11 MMOL/L (ref 7–15)
ANTIBODY TITER IGM SCREEN: NEGATIVE
APPEARANCE UR: CLEAR
APTT PPP: 32 SECONDS (ref 22–38)
AST SERPL W P-5'-P-CCNC: 47 U/L (ref 0–45)
B IGG TITR SERPL: 64 {TITER}
B IGM TITR SERPL: 64 {TITER}
BASOPHILS # BLD AUTO: 0.1 10E3/UL (ref 0–0.2)
BASOPHILS NFR BLD AUTO: 1 %
BILIRUB SERPL-MCNC: 0.3 MG/DL
BILIRUB UR QL STRIP: NEGATIVE
BUN SERPL-MCNC: 35 MG/DL (ref 6–20)
CALCIUM SERPL-MCNC: 9.1 MG/DL (ref 8.8–10.4)
CHLORIDE SERPL-SCNC: 107 MMOL/L (ref 98–107)
COLOR UR AUTO: ABNORMAL
CREAT SERPL-MCNC: 3 MG/DL (ref 0.67–1.17)
CREAT UR-MCNC: 79.5 MG/DL
CREAT UR-MCNC: 79.9 MG/DL
CYSTATIN C (ROCHE): 2.8 MG/L (ref 0.6–1)
EGFRCR SERPLBLD CKD-EPI 2021: 26 ML/MIN/1.73M2
EOSINOPHIL # BLD AUTO: 1.7 10E3/UL (ref 0–0.7)
EOSINOPHIL NFR BLD AUTO: 17 %
ERYTHROCYTE [DISTWIDTH] IN BLOOD BY AUTOMATED COUNT: 14.5 % (ref 10–15)
FACTOR 2 INTERPRETATION: NORMAL
FACTOR V INTERPRETATION: NORMAL
FERRITIN SERPL-MCNC: 100 NG/ML (ref 31–409)
GFR/BSA.PRED SERPLBLD CYS-BASED-ARV: 22 ML/MIN/1.73M2
GLUCOSE SERPL-MCNC: 74 MG/DL (ref 70–99)
GLUCOSE UR STRIP-MCNC: NEGATIVE MG/DL
HCO3 SERPL-SCNC: 21 MMOL/L (ref 22–29)
HCT VFR BLD AUTO: 36.6 % (ref 40–53)
HCV AB SERPL QL IA: NONREACTIVE
HGB BLD-MCNC: 11.6 G/DL (ref 13.3–17.7)
HGB UR QL STRIP: NEGATIVE
HIV 1+2 AB+HIV1 P24 AG SERPL QL IA: NONREACTIVE
IMM GRANULOCYTES # BLD: 0 10E3/UL
IMM GRANULOCYTES NFR BLD: 0 %
INR PPP: 1.04 (ref 0.85–1.15)
IRON BINDING CAPACITY (ROCHE): 293 UG/DL (ref 240–430)
IRON SATN MFR SERPL: 38 % (ref 15–46)
IRON SERPL-MCNC: 111 UG/DL (ref 61–157)
KETONES UR STRIP-MCNC: NEGATIVE MG/DL
LAB DIRECTOR COMMENTS: NORMAL
LAB DIRECTOR DISCLAIMER: NORMAL
LAB DIRECTOR INTERPRETATION: NORMAL
LAB DIRECTOR METHODOLOGY: NORMAL
LAB DIRECTOR RESULTS: NORMAL
LEUKOCYTE ESTERASE UR QL STRIP: NEGATIVE
LVEF ECHO: NORMAL
LYMPHOCYTES # BLD AUTO: 2.7 10E3/UL (ref 0.8–5.3)
LYMPHOCYTES NFR BLD AUTO: 27 %
MCH RBC QN AUTO: 28.3 PG (ref 26.5–33)
MCHC RBC AUTO-ENTMCNC: 31.7 G/DL (ref 31.5–36.5)
MCV RBC AUTO: 89 FL (ref 78–100)
MICROALBUMIN UR-MCNC: 82.7 MG/L
MICROALBUMIN/CREAT UR: 104.03 MG/G CR (ref 0–17)
MONOCYTES # BLD AUTO: 1.2 10E3/UL (ref 0–1.3)
MONOCYTES NFR BLD AUTO: 12 %
NEUTROPHILS # BLD AUTO: 4.3 10E3/UL (ref 1.6–8.3)
NEUTROPHILS NFR BLD AUTO: 43 %
NITRATE UR QL: NEGATIVE
NRBC # BLD AUTO: 0 10E3/UL
NRBC BLD AUTO-RTO: 0 /100
PH UR STRIP: 6 [PH] (ref 5–7)
PHOSPHATE SERPL-MCNC: 4.7 MG/DL (ref 2.5–4.5)
PLATELET # BLD AUTO: 391 10E3/UL (ref 150–450)
POTASSIUM SERPL-SCNC: 4.9 MMOL/L (ref 3.4–5.3)
PROT SERPL-MCNC: 7.4 G/DL (ref 6.4–8.3)
PROT/CREAT 24H UR: 0.22 MG/MG CR (ref 0–0.2)
RBC # BLD AUTO: 4.1 10E6/UL (ref 4.4–5.9)
RBC URINE: <1 /HPF
SODIUM SERPL-SCNC: 139 MMOL/L (ref 135–145)
SP GR UR STRIP: 1.01 (ref 1–1.03)
SPECIMEN EXP DATE BLD: NORMAL
SPECIMEN EXP DATE BLD: NORMAL
SPECIMEN TYPE: NORMAL
SQUAMOUS EPITHELIAL: <1 /HPF
T PALLIDUM AB SER QL: NONREACTIVE
UROBILINOGEN UR STRIP-MCNC: NORMAL MG/DL
WBC # BLD AUTO: 10 10E3/UL (ref 4–11)
WBC URINE: <1 /HPF

## 2025-03-24 PROCEDURE — 86832 HLA CLASS I HIGH DEFIN QUAL: CPT | Performed by: NURSE PRACTITIONER

## 2025-03-24 PROCEDURE — 85730 THROMBOPLASTIN TIME PARTIAL: CPT | Performed by: NURSE PRACTITIONER

## 2025-03-24 PROCEDURE — 86147 CARDIOLIPIN ANTIBODY EA IG: CPT | Performed by: NURSE PRACTITIONER

## 2025-03-24 PROCEDURE — 99211 OFF/OP EST MAY X REQ PHY/QHP: CPT | Performed by: NURSE PRACTITIONER

## 2025-03-24 PROCEDURE — 83540 ASSAY OF IRON: CPT | Performed by: PATHOLOGY

## 2025-03-24 PROCEDURE — 3077F SYST BP >= 140 MM HG: CPT | Performed by: SURGERY

## 2025-03-24 PROCEDURE — 99213 OFFICE O/P EST LOW 20 MIN: CPT | Performed by: SURGERY

## 2025-03-24 PROCEDURE — 86780 TREPONEMA PALLIDUM: CPT | Performed by: NURSE PRACTITIONER

## 2025-03-24 PROCEDURE — 86833 HLA CLASS II HIGH DEFIN QUAL: CPT | Performed by: NURSE PRACTITIONER

## 2025-03-24 PROCEDURE — 82728 ASSAY OF FERRITIN: CPT | Performed by: PATHOLOGY

## 2025-03-24 PROCEDURE — 86900 BLOOD TYPING SEROLOGIC ABO: CPT

## 2025-03-24 PROCEDURE — 86803 HEPATITIS C AB TEST: CPT | Performed by: NURSE PRACTITIONER

## 2025-03-24 PROCEDURE — G0452 MOLECULAR PATHOLOGY INTERPR: HCPCS | Mod: 26 | Performed by: STUDENT IN AN ORGANIZED HEALTH CARE EDUCATION/TRAINING PROGRAM

## 2025-03-24 PROCEDURE — 99205 OFFICE O/P NEW HI 60 MIN: CPT | Performed by: NURSE PRACTITIONER

## 2025-03-24 PROCEDURE — 85025 COMPLETE CBC W/AUTO DIFF WBC: CPT | Performed by: PATHOLOGY

## 2025-03-24 PROCEDURE — 93306 TTE W/DOPPLER COMPLETE: CPT | Mod: GC | Performed by: INTERNAL MEDICINE

## 2025-03-24 PROCEDURE — 86828 HLA CLASS I&II ANTIBODY QUAL: CPT | Performed by: NURSE PRACTITIONER

## 2025-03-24 PROCEDURE — 71046 X-RAY EXAM CHEST 2 VIEWS: CPT | Mod: GC | Performed by: STUDENT IN AN ORGANIZED HEALTH CARE EDUCATION/TRAINING PROGRAM

## 2025-03-24 PROCEDURE — 85730 THROMBOPLASTIN TIME PARTIAL: CPT | Performed by: PATHOLOGY

## 2025-03-24 PROCEDURE — 86481 TB AG RESPONSE T-CELL SUSP: CPT | Performed by: NURSE PRACTITIONER

## 2025-03-24 PROCEDURE — 82043 UR ALBUMIN QUANTITATIVE: CPT | Performed by: INTERNAL MEDICINE

## 2025-03-24 PROCEDURE — 83550 IRON BINDING TEST: CPT | Performed by: PATHOLOGY

## 2025-03-24 PROCEDURE — 81240 F2 GENE: CPT | Performed by: NURSE PRACTITIONER

## 2025-03-24 PROCEDURE — 86886 COOMBS TEST INDIRECT TITER: CPT

## 2025-03-24 PROCEDURE — 99000 SPECIMEN HANDLING OFFICE-LAB: CPT | Performed by: PATHOLOGY

## 2025-03-24 PROCEDURE — 99204 OFFICE O/P NEW MOD 45 MIN: CPT | Performed by: SURGERY

## 2025-03-24 PROCEDURE — 80053 COMPREHEN METABOLIC PANEL: CPT | Performed by: PATHOLOGY

## 2025-03-24 PROCEDURE — 85390 FIBRINOLYSINS SCREEN I&R: CPT | Mod: 26 | Performed by: PATHOLOGY

## 2025-03-24 PROCEDURE — 82610 CYSTATIN C: CPT | Performed by: NURSE PRACTITIONER

## 2025-03-24 PROCEDURE — 85670 THROMBIN TIME PLASMA: CPT | Performed by: NURSE PRACTITIONER

## 2025-03-24 PROCEDURE — 84156 ASSAY OF PROTEIN URINE: CPT | Performed by: PATHOLOGY

## 2025-03-24 PROCEDURE — 81001 URINALYSIS AUTO W/SCOPE: CPT | Performed by: PATHOLOGY

## 2025-03-24 PROCEDURE — 82570 ASSAY OF URINE CREATININE: CPT | Performed by: INTERNAL MEDICINE

## 2025-03-24 PROCEDURE — 86644 CMV ANTIBODY: CPT | Performed by: NURSE PRACTITIONER

## 2025-03-24 PROCEDURE — 86787 VARICELLA-ZOSTER ANTIBODY: CPT | Performed by: NURSE PRACTITIONER

## 2025-03-24 PROCEDURE — 85610 PROTHROMBIN TIME: CPT | Performed by: PATHOLOGY

## 2025-03-24 PROCEDURE — 36415 COLL VENOUS BLD VENIPUNCTURE: CPT | Performed by: PATHOLOGY

## 2025-03-24 PROCEDURE — G2211 COMPLEX E/M VISIT ADD ON: HCPCS | Performed by: SURGERY

## 2025-03-24 PROCEDURE — 86665 EPSTEIN-BARR CAPSID VCA: CPT | Performed by: NURSE PRACTITIONER

## 2025-03-24 PROCEDURE — 3078F DIAST BP <80 MM HG: CPT | Performed by: SURGERY

## 2025-03-24 PROCEDURE — 81379 HLA I TYPING COMPLETE HR: CPT | Performed by: NURSE PRACTITIONER

## 2025-03-24 PROCEDURE — 3078F DIAST BP <80 MM HG: CPT | Performed by: NURSE PRACTITIONER

## 2025-03-24 PROCEDURE — 84100 ASSAY OF PHOSPHORUS: CPT | Performed by: PATHOLOGY

## 2025-03-24 PROCEDURE — 3077F SYST BP >= 140 MM HG: CPT | Performed by: NURSE PRACTITIONER

## 2025-03-24 PROCEDURE — 81382 HLA II TYPING 1 LOC HR: CPT | Performed by: NURSE PRACTITIONER

## 2025-03-24 PROCEDURE — 87340 HEPATITIS B SURFACE AG IA: CPT | Performed by: NURSE PRACTITIONER

## 2025-03-24 PROCEDURE — 86704 HEP B CORE ANTIBODY TOTAL: CPT | Performed by: NURSE PRACTITIONER

## 2025-03-24 PROCEDURE — 86850 RBC ANTIBODY SCREEN: CPT

## 2025-03-24 PROCEDURE — 86706 HEP B SURFACE ANTIBODY: CPT | Performed by: NURSE PRACTITIONER

## 2025-03-24 NOTE — Clinical Note
3/24/2025      Matthew Arias  [address]  [place] [address]      Dear Colleague,    Thank you for referring your patient, Matthew Arias, to the [facility] TRANSPLANT [facility]. Please see a copy of my visit note below.    TRANSPLANT NEPHROLOGY RECIPIENT EVALUATION NOTE    Assessment and Plan:  # Kidney Transplant Evaluation: Patient is a {desc.:802705} candidate overall. Benefits of a living donor transplant were discussed.    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 GFR 26 with Cystatin C 22.    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 Good function, follows annually.    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  Patient's overall evaluation will be discussed in the Transplant Program's regular meeting with a final recommendation on the patients suitability for transplant to be made at that time.    Evaluation:  Matthew Arias was seen in  consultation at the request of Dr. Kalpana Royal for evaluation as a potential kidney transplant recipient.    Reason for Visit:  Matthew Arias is a 39 year old male with CKD from CNI toxicity, who presents for kidney transplant evaluation.    History of Present Illness:   ***         Kidney Disease Hx:        ***Scr fluctuates between 2.6-3s eGFR was 28ml/min by cystatin C in 2022 , it was 2.7 eGFR 23 in October 2023 a little lower than creatinine of 2.8 with eGFR which was 28  Recurrent TRISTON episodes  B/l multiple renal cysts       Kidney Disease Dx: CNI toxicity       Biopsy Proven: Yes; ***         On Dialysis: No       Primary Nephrologist: Mathew       H/o Kidney Stones: No       H/o Recurrent/Frequent UTI: No         Diabetic Hx: None           Cardiac/Vascular Disease Risk Factors:        Cardiac Risk Factors: {Cardiac Risk Factors:300332}       Known CAD: {YES WITH WILD CARD/NO:15009018}       Known PAD/Caludication Symptoms: {YES WITH WILD CARD/NO:15845595}       Known Heart Failure: {Cardiac Risk Factors:159146}       Arrhythmia: No       Pulmonary Hypertension: {YES WITH WILD CARD/NO:10838854}       Valvular Disease: {YES WITH WILD CARD/NO:57934823}       Other: {Cardiac Risk Factors:335432}         Viral Serology Status       CMV IgG Antibody: Negative       EBV IgG Antibody: Positive         Volume Status/Weight:        Volume status: { :928087}       Weight:  {FV RENAL TX Recip Eval Weight:519906}       BMI: There is no height or weight on file to calculate BMI.         Functional Capacity/Frailty:         Stopped working at [employer] due to increased fatigue, previously worked on his feet.  Walks 3 dogs, able to go 1.5miles with each dog. No formal exercise.     Fatigue/Decreased Energy: [] No [x] Yes    Chest Pain or SOB with Exertion: [x] No [] Yes    Significant Weight Change: [] No [x] Yes Down -10 lbs from    Nausea, Vomiting or Diarrhea: [x] No [] Yes     Fever, Sweats or Chills:   [x] No [] Yes    Leg Swelling [x] No [] Yes        History of Cancer: None    Other Significant Medical Issues: {None/***:613741}      Allergy Testing Questions:   Medication that caused a reaction Penicillin (Amoxicillin, Amoxicillin with clavulanic acid, Dicloxacillin) and Sufla Drugs (Sufla/TMP or Bactrim) ***   Antibiotics used that didn't give an allergic reaction?  Doxycycline or Tetracycline and Azithromycin    COVID Vaccination Up To Date: { :607056}    Potential Living Kidney Donors: Yes    Review of Systems:  A comprehensive review of systems was obtained and negative, except as noted in the HPI or PMH.    Past Medical History:   Medical record was reviewed and PMH was discussed with patient and noted below.  No past medical history on file.    Past Social History:   No past surgical history on file.  Personal history of bleeding or anesthesia problems: No    Family History:  No family history on file.    Personal History:   Social History     Socioeconomic History    Marital status: Patient Declined     Spouse name: Not on file    Number of children: Not on file    Years of education: Not on file    Highest education level: Not on file   Occupational History    Not on file   Tobacco Use    Smoking status: Never    Smokeless tobacco: Never   Vaping Use    Vaping status: Never Used   Substance and Sexual Activity    Alcohol use: Not Currently     Comment: Last drink 3 months ago    Drug use: Never    Sexual activity: Not on file   Other Topics Concern    Not on file   Social History Narrative    Not on file     Social Drivers of Health     Financial Resource Strain: Medium Risk (11/25/2024)    [facility]    Financial Resource Strain     Difficulty of Paying Living Expenses: 1     Difficulty of Paying Living Expenses: 2   Food Insecurity: No Food Insecurity (11/25/2024)    [facility]    Food Insecurity     Do you  worry your food will run out before you are able to buy more?: 1   Transportation Needs: No Transportation Needs (11/25/2024)    Received from [facility]    Transportation Needs     Does lack of transportation keep you from medical appointments?: 1     Does lack of transportation keep you from work, meetings or getting things that you need?: 1   Physical Activity: Sufficiently Active (1/11/2022)    Received from [facility], [facility]    Exercise Vital Sign     Days of Exercise per Week: 5 days     Minutes of Exercise per Session: 150+ min   Stress: No Stress Concern Present (1/11/2022)    Received from [facility], [facility]    [employer] [place] [employer] - Occupational Stress Questionnaire     Feeling of Stress : Not at all   Social Connections: Socially Isolated (11/25/2024)    Received from [facility]    Social Connections     Do you often feel lonely or isolated from those around you?: 4   Interpersonal Safety: Patient Unable To Answer (11/16/2024)    Received from [facility]    Humiliation, Afraid, Rape, and Kick questionnaire     Fear of Current or Ex-Partner: Patient unable to answer     Emotionally Abused: Patient unable to answer     Physically Abused: Patient unable to answer     Sexually Abused: Patient unable to answer   Housing Stability: Low Risk  (11/25/2024)    Received from [facility]    Housing Stability     What is your housing situation today?: 1       Allergies:  Allergies   Allergen Reactions    Levofloxacin      Other Reaction(s): Renal Failure    Hx of renal failure    Amoxicillin-Pot Clavulanate Rash     Patient reported this was a couple years ago and he did not need treatment for the rash. Just stopped taking the medication.    Bee Pollen Itching     Allergic Rhinitis, Itchy watery eyes    Pollen Extract Itching     Other Reaction(s): RUNNY NOSE    Seasonal Allergies:  "Allergic Rhinitis, Itchy watery eyes    Seasonal Allergies Itching     Allergic Rhinitis, Itchy watery eyes    Cefprozil GI Disturbance     Other Reaction(s): GI Upset    Clavulanic Acid Unknown     Other Reaction(s): Other (see comments)    Erythromycin      Other Reaction(s): GI UPSET, GI Upset    Gramineae Pollens Other (See Comments)    Sulfa Antibiotics      Other Reaction(s): *Unknown - Childhood Rxn    Macrolides And Ketolides Rash     Other Reaction(s): GI UPSET, UNKNOWN    Penicillins Rash       Medications:  Current Outpatient Medications   Medication Sig Dispense Refill    acetaminophen (TYLENOL) 500 MG tablet Take 500 mg by mouth      buPROPion (WELLBUTRIN SR) 100 MG 12 hr tablet Take 150 mg by mouth      empagliflozin (JARDIANCE) 10 MG TABS tablet Take 1 tablet (10 mg) by mouth daily. 90 tablet 3    FLUoxetine (PROZAC) 20 MG capsule TAKE 1 CAPSULE(20 MG) BY MOUTH EVERY MORNING      levETIRAcetam (KEPPRA) 500 MG tablet Take 500 mg by mouth every 12 hours      mycophenolate (GENERIC EQUIVALENT) 250 MG capsule Take 2 capsules by mouth every 12 hours      omeprazole (PRILOSEC) 40 MG DR capsule Take 1 capsule by mouth every 12 hours (Patient not taking: Reported on 2/26/2025)      predniSONE (DELTASONE) 5 MG tablet Take 10 mg by mouth daily      sodium bicarbonate 650 MG tablet Take 1 tablet (650 mg) by mouth 2 times daily. 180 tablet 3    Vitamin D3 (CHOLECALCIFEROL) 25 mcg (1000 units) tablet Take 1 tablet (25 mcg) by mouth daily. 90 tablet 3     No current facility-administered medications for this visit.       Vitals:  BP (!) 149/76 (BP Location: Right arm, Patient Position: Chair, Cuff Size: Adult Small)   Pulse 64   Temp 97.5  F (36.4  C) (Oral)   Resp 16   Ht 1.795 m (5' 10.67\")   Wt 57.1 kg (125 lb 14.4 oz)   SpO2 99%   BMI 17.72 kg/m      Exam:  GENERAL APPEARANCE: alert and no distress  HENT: mouth without ulcers or lesions  RESP: lungs clear to auscultation - no rales, rhonchi or " wheezes  CV: regular rhythm, normal rate, no rub, no murmur  EDEMA: no LE edema bilaterally  ABDOMEN: soft, nondistended, nontender, bowel sounds normal  MS: extremities normal - no gross deformities noted, no evidence of inflammation in joints, no muscle tenderness  SKIN: no rash  DIALYSIS ACCESS: none    Results:   No results found for this or any previous visit (from the past 2 weeks).            Again, thank you for allowing me to participate in the care of your patient.        Sincerely,        Mily Boudreaux NP    Electronically signed

## 2025-03-24 NOTE — PROGRESS NOTES
"Transplant Surgery Consult Note     Medical record number: [id number]  YOB: 1985,   Consult requested  for evaluation of {ORGAN:425745} transplant candidacy.    Assessment and Recommendations:Mr. Arias appears to be a {QUALITY:4387946} candidate for kidney transplantation and has a {GOOD/FAIR/POOR:360396::\"good\"} understanding of the risks and benefits of this approach to the management of renal failure. The following issues should be addressed prior to finalizing his transplant candidacy:     Transplant order: Mr. Arias has {[facility] TR[facility] DX:861842} whose condition is not expected to resolve, is expected to progress, and is expected to continue to develop related comorbid conditions.  Recommend he be considered as a candidate for ***.  Cardiology consult for cardiac risk stratification to be ordered: {Yes and No:524866}  CT abdomen and pelvis without contrast to be ordered for assessment of vascular targets: {Yes and No:727394}  Transplant listing labs ordered to include HLA, ABOx2, Cr, etc.  Dietician consult ordered: Yes  Social work consult ordered: Yes  Imaging reports reviewed:  ***  Radiology images reviewed:***  Recipient suitable to move forward with work up of living donors:  {Yes and No:476869}  On MMF and pred for maintenance of liver. CNI sparing  Derm update  Please make sure he's up to date on splenectomy vaccines    The majority of our visit was spent in counselling, discussing the medical and surgical risks of kidney transplantation. We discussed approximate wait time and how that is influenced by issues such as blood type and sensitization (PRA) and access to a living donor. I contrasted potential waiting time for living vs  donor kidneys from  normal (0-85%) or higher (%) kidney donor profile index (KDPI) donors and their associated outcomes. I {Would:118094} recommend this individual to consider kidneys from high KDPI donors. The reason for this decision is " best summarized as: {[facility] REASON:[id number]}. Potential surgical complications of kidney transplantation include bleeding, superficial or deep wound complications (infection, hernia, lymphocele), ureteral anastomotic failure (leak or stenosis), graft thrombosis, need for reoperation and other issues such as cardiac complications, pneumonia, deep venous thrombosis, pulmonary embolism, post transplant diabetes and death. The potential for recurrent disease or need for retransplantation was also addressed. We discussed the possible need for ureteral stent (and subsequent removal), and the utility of protocol biopsy and laboratory studies to evaluate for rejection or recurrent disease. We discussed the risk of graft rejection, our center's average graft and patient survival rates, immunosuppression protocols, as well as the potential opportunity to participate in clinical trials.  We also discussed the average length of stay, recovery process, and posttransplant lab and monitoring protocol.  I emphasized the need for strict immunosuppression medication adherence and the potential for complications of immunosuppression such as skin cancer or lymphoma, as well as a very low but not zero risk of donor-derived disease transmission risks (infection, cancer). Mr. Arias asked good questions and his candidacy will be reviewed at our Multidisciplinary Selection Committee. Thank you for the opportunity to participate in Mr. Arias's care.      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 The patient is non-diabetic.       The patient is not on dialysis.    Has potential kidney donors:  Yes .  Interested in participation in paired exchange if a donor is willing: Doesn't know     The patient has the  following pertinent history:       No    Yes  Dialysis:    [x]      [] via:       Blood Transfusion                  []      [x]  Number of units:   Most recently:2001  Pregnancy:    [x]      [] Number:       Previous Transplant:  []      [x] Details:  liver txp x 2 1986 and 2001  Cancer    [x]      [] Comment:   Kidney stones   [x]      [] Comment:      Recurrent infections  [x]      []  Type:                  Bladder dysfunction  [x]      [] Cause:    Claudication   [x]      [] Distance:    Previous Amputation  [x]      [] Cause:     Chronic anticoagulation  [x]      [] Indication:   Protestant  [x]      []      No past medical history on file.  No past surgical history on file.  Liver transplant 1986  Liver transplant 2001  Splenectomy   Family History   Problem Relation Age of Onset    No Known Problems Mother     No Known Problems Father     No Known Problems Sister     Kidney Disease Paternal Grandfather      Social History     Socioeconomic History    Marital status: Patient Declined     Spouse name: Not on file    Number of children: Not on file    Years of education: Not on file    Highest education level: Not on file   Occupational History    Not on file   Tobacco Use    Smoking status: Never    Smokeless tobacco: Never   Vaping Use    Vaping status: Never Used   Substance and Sexual Activity    Alcohol use: Not Currently     Comment: Last drink 3 months ago    Drug use: Never    Sexual activity: Not on file   Other Topics Concern    Not on file   Social History Narrative    Not on file     Social Drivers of Health     Financial Resource Strain: Medium Risk (11/25/2024)    [facility]    Financial Resource Strain     Difficulty of Paying Living Expenses: 1     Difficulty of Paying Living Expenses: 2   Food Insecurity: No Food Insecurity (11/25/2024)    Received from [facility]    Food Insecurity     Do you worry your  food will run out before you are able to buy more?: 1   Transportation Needs: No Transportation Needs (11/25/2024)    Received from [facility]    Transportation Needs     Does lack of transportation keep you from medical appointments?: 1     Does lack of transportation keep you from work, meetings or getting things that you need?: 1   Physical Activity: Sufficiently Active (1/11/2022)    Received from [facility], [facility]    Exercise Vital Sign     Days of Exercise per Week: 5 days     Minutes of Exercise per Session: 150+ min   Stress: No Stress Concern Present (1/11/2022)    Received from [facility], [facility]    [employer] [place] [employer] - Occupational Stress Questionnaire     Feeling of Stress : Not at all   Social Connections: Socially Isolated (11/25/2024)    Received from [facility]    Social Connections     Do you often feel lonely or isolated from those around you?: 4   Interpersonal Safety: Patient Unable To Answer (11/16/2024)    Received from [facility]    Humiliation, Afraid, Rape, and Kick questionnaire     Fear of Current or Ex-Partner: Patient unable to answer     Emotionally Abused: Patient unable to answer     Physically Abused: Patient unable to answer     Sexually Abused: Patient unable to answer   Housing Stability: Low Risk  (11/25/2024)    Received from [facility]    Housing Stability     What is your housing situation today?: 1       ROS:   CONSTITUTIONAL:  No fevers or chills  EYES: negative for icterus  ENT:  negative for hearing loss, tinnitus and sore throat  RESPIRATORY:  negative for cough, sputum, dyspnea  CARDIOVASCULAR:  negative for chest pain {Vascular Disease Manifestation:461004}  GASTROINTESTINAL:  negative for nausea, vomiting, diarrhea or constipation  GENITOURINARY:  negative for incontinence, dysuria, bladder emptying problems  HEME:  No easy  bruising  INTEGUMENT:  negative for rash and pruritus  NEURO:  Negative for headache, seizure disorder  Allergies:   Allergies   Allergen Reactions    Levofloxacin      Other Reaction(s): Renal Failure    Hx of renal failure    Amoxicillin-Pot Clavulanate Rash     Patient reported this was a couple years ago and he did not need treatment for the rash. Just stopped taking the medication.    Bee Pollen Itching     Allergic Rhinitis, Itchy watery eyes    Pollen Extract Itching     Other Reaction(s): RUNNY NOSE    Seasonal Allergies: Allergic Rhinitis, Itchy watery eyes    Seasonal Allergies Itching     Allergic Rhinitis, Itchy watery eyes    Cefprozil GI Disturbance     Other Reaction(s): GI Upset    Clavulanic Acid Unknown     Other Reaction(s): Other (see comments)    Erythromycin      Other Reaction(s): GI UPSET, GI Upset    Gramineae Pollens Other (See Comments)    Sulfa Antibiotics      Other Reaction(s): *Unknown - Childhood Rxn    Macrolides And Ketolides Rash     Other Reaction(s): GI UPSET, UNKNOWN    Penicillins Rash     Medications:  Prescription Medications as of 3/24/2025         Rx Number Disp Refills Start End Last Dispensed Date Next Fill Date Owning Pharmacy    acetaminophen (TYLENOL) 500 MG tablet  -- -- 7/1/2023 --       Sig: Take 500 mg by mouth    Class: Historical    Route: Oral    buPROPion (WELLBUTRIN SR) 100 MG 12 hr tablet  -- --  --       Sig: Take 150 mg by mouth    Class: Historical    Route: Oral    empagliflozin (JARDIANCE) 10 MG TABS tablet  90 tablet 3 2/26/2025 --   [facility] - [place], [place] - [facility] [address]     Sig: Take 1 tablet (10 mg) by mouth daily.    Class: E-Prescribe    Route: Oral    FLUoxetine (PROZAC) 20 MG capsule  -- -- 5/18/2023 --       Sig: TAKE 1 CAPSULE(20 MG) BY MOUTH EVERY MORNING    Class: Historical    Route: Oral    levETIRAcetam (KEPPRA) 500 MG tablet  -- -- 4/13/2023 --       Sig: Take 500 mg by mouth every 12 hours    Class:  "Historical    Route: Oral    mycophenolate (GENERIC EQUIVALENT) 250 MG capsule  -- -- 4/13/2023 --       Sig: Take 2 capsules by mouth every 12 hours    Class: Historical    Route: Oral    predniSONE (DELTASONE) 5 MG tablet  -- --  --       Sig: Take 10 mg by mouth daily    Class: Historical    Route: Oral    sodium bicarbonate 650 MG tablet  180 tablet 3 2/26/2025 --   [id number] - [place], [place] - [address] AT [place] [address]    Sig: Take 1 tablet (650 mg) by mouth 2 times daily.    Class: E-Prescribe    Route: Oral    Vitamin D3 (CHOLECALCIFEROL) 25 mcg (1000 units) tablet  90 tablet 3 10/16/2024 --   [id number] - [place], [place] - [address] AT [place] [address]    Sig: Take 1 tablet (25 mcg) by mouth daily.    Class: E-Prescribe    Route: Oral          Exam:     BP (!) 149/76 (BP Location: Right arm, Patient Position: Chair, Cuff Size: Adult Small)   Pulse 64   Temp 97.5  F (36.4  C) (Oral)   Resp 16   Ht 1.795 m (5' 10.67\")   Wt 57.1 kg (125 lb 14.4 oz)   SpO2 99%   BMI 17.72 kg/m    Appearance: {Distress levels:186498::\"in no apparent distress.\"}   Skin: {SKIN:124542}  Eyes:  no redness or discharge.  Sclera anicteric  Head and Neck: {JAUNDICE:853015:s}  Respiratory: easy respirations, no audible wheezing.  Abdomen: {ABDOMEN INSPECTION:602}, {ABDOMEN (MM):311222}   Extremities: {PULSE POSITIVES LIST:083741:s}, Edema, {EDEMA1:169325}  Neuro: {NEURO:951281}   Psychiatric: Normal mood and affect    Diagnostics:   No results found for this or any previous visit (from the past 4 weeks).  No results found for: \"CPRA\"   " "Chair, Cuff Size: Adult Small)   Pulse 64   Temp 97.5  F (36.4  C) (Oral)   Resp 16   Ht 1.795 m (5' 10.67\")   Wt 57.1 kg (125 lb 14.4 oz)   SpO2 99%   BMI 17.72 kg/m    Appearance: in no apparent distress.   Skin: normal  Eyes:  no redness or discharge.  Sclera anicteric  Head and Neck: Normal, no rashes or jaundice  Respiratory: easy respirations, no audible wheezing.  Abdomen: flat and scaphoid, No distention and Surgical scars consistent with history   Extremities: femoral 4+/4+, Edema, none  Neuro: without deficit   Psychiatric: Normal mood and affect    Diagnostics:   No results found for this or any previous visit (from the past 4 weeks).  No results found for: \"CPRA\"   "

## 2025-03-24 NOTE — NURSING NOTE
"Chief Complaint   Patient presents with    Transplant Evaluation     Kidney transplant evaluation     Vital signs:  Temp: 97.5  F (36.4  C) Temp src: Oral BP: (!) 149/76 Pulse: 64   Resp: 16 SpO2: 99 %     Height: 179.5 cm (5' 10.67\") Weight: 57.1 kg (125 lb 14.4 oz)  Estimated body mass index is 17.72 kg/m  as calculated from the following:    Height as of this encounter: 1.795 m (5' 10.67\").    Weight as of this encounter: 57.1 kg (125 lb 14.4 oz).      Braeden Celaya RN on 3/24/2025 at 7:49 AM    "

## 2025-03-24 NOTE — Clinical Note
"3/24/2025      Matthew Arias  [address]  [facility] [address]      Dear Colleague,    Thank you for referring your patient, Matthew Arias, to the [facility] TRANSPLANT [facility]. Please see a copy of my visit note below.    Transplant Surgery Consult Note     Medical record number: [id number]  YOB: 1985,   Consult requested  for evaluation of {ORGAN:134744} transplant candidacy.    Assessment and Recommendations:Mr. Arias appears to be a {QUALITY:4070138} candidate for kidney transplantation and has a {GOOD/FAIR/POOR:866824::\"good\"} understanding of the risks and benefits of this approach to the management of renal failure. The following issues should be addressed prior to finalizing his transplant candidacy:     Transplant order: Mr. Arias has {[facility] TRANSPLANT DX:461475} whose condition is not expected to resolve, is expected to progress, and is expected to continue to develop related comorbid conditions.  Recommend he be considered as a candidate for ***.  Cardiology consult for cardiac risk stratification to be ordered: {Yes and No:674855}  CT abdomen and pelvis without contrast to be ordered for assessment of vascular targets: {Yes and No:141807}  Transplant listing labs ordered to include HLA, ABOx2, Cr, etc.  Dietician consult ordered: Yes  Social work consult ordered: Yes  Imaging reports reviewed:  ***  Radiology images reviewed:***  Recipient suitable to move forward with work up of living donors:  {Yes and No:310011}  On MMF and pred for maintenance of liver. CNI sparing  Derm update  Please make sure he's up to date on splenectomy vaccines    The majority of our visit was spent in counselling, discussing the medical and surgical risks of kidney transplantation. We discussed approximate wait time and how that is influenced by issues such as blood type and sensitization (PRA) and access to a living donor. I contrasted potential waiting time for living vs "  donor kidneys from  normal (0-85%) or higher (%) kidney donor profile index (KDPI) donors and their associated outcomes. I {Would:284832} recommend this individual to consider kidneys from high KDPI donors. The reason for this decision is best summarized as: {KDPI REASON:092698524}. Potential surgical complications of kidney transplantation include bleeding, superficial or deep wound complications (infection, hernia, lymphocele), ureteral anastomotic failure (leak or stenosis), graft thrombosis, need for reoperation and other issues such as cardiac complications, pneumonia, deep venous thrombosis, pulmonary embolism, post transplant diabetes and death. The potential for recurrent disease or need for retransplantation was also addressed. We discussed the possible need for ureteral stent (and subsequent removal), and the utility of protocol biopsy and laboratory studies to evaluate for rejection or recurrent disease. We discussed the risk of graft rejection, our center's average graft and patient survival rates, immunosuppression protocols, as well as the potential opportunity to participate in clinical trials.  We also discussed the average length of stay, recovery process, and posttransplant lab and monitoring protocol.  I emphasized the need for strict immunosuppression medication adherence and the potential for complications of immunosuppression such as skin cancer or lymphoma, as well as a very low but not zero risk of donor-derived disease transmission risks (infection, cancer). Mr. Arias asked good questions and his candidacy will be reviewed at our Multidisciplinary Selection Committee. Thank you for the opportunity to participate in Mr. Arias's care.      Total time: *** minutes  Counselling time: *** minutes    {[facility] TRANSPLANT MD SIGNATURE:[id number]}    ---------------------------------------------------------------------------------------------------    HPI: Mr. Arias has Chronic  renal failure due to CNI toxicity. The patient is non-diabetic.       The patient is not on dialysis.    Has potential kidney donors:  Yes .  Interested in participation in paired exchange if a donor is willing: Doesn't know     The patient has the following pertinent history:       No    Yes  Dialysis:    [x]      [] via:       Blood Transfusion                  []      [x]  Number of units:   Most recently:2001  Pregnancy:    [x]      [] Number:       Previous Transplant:  []      [x] Details:  liver txp x 2 1986 and 2001  Cancer    [x]      [] Comment:   Kidney stones   [x]      [] Comment:      Recurrent infections  [x]      []  Type:                  Bladder dysfunction  [x]      [] Cause:    Claudication   [x]      [] Distance:    Previous Amputation  [x]      [] Cause:     Chronic anticoagulation  [x]      [] Indication:   Tenriism  [x]      []      No past medical history on file.  No past surgical history on file.  Liver transplant 1986  Liver transplant 2001  Splenectomy   Family History   Problem Relation Age of Onset    No Known Problems Mother     No Known Problems Father     No Known Problems Sister     Kidney Disease Paternal Grandfather      Social History     Socioeconomic History    Marital status: Patient Declined     Spouse name: Not on file    Number of children: Not on file    Years of education: Not on file    Highest education level: Not on file   Occupational History    Not on file   Tobacco Use    Smoking status: Never    Smokeless tobacco: Never   Vaping Use    Vaping status: Never Used   Substance and Sexual Activity    Alcohol use: Not Currently     Comment: Last drink 3 months ago    Drug use: Never    Sexual activity: Not on file   Other Topics Concern    Not on file   Social History Narrative    Not on file     Social Drivers of Health     Financial Resource Strain: Medium Risk (11/25/2024)    Received from [facility]    Financial Resource  Strain     Difficulty of Paying Living Expenses: 1     Difficulty of Paying Living Expenses: 2   Food Insecurity: No Food Insecurity (11/25/2024)    [facility]    Food Insecurity     Do you worry your food will run out before you are able to buy more?: 1   Transportation Needs: No Transportation Needs (11/25/2024)    Received from [facility]    Transportation Needs     Does lack of transportation keep you from medical appointments?: 1     Does lack of transportation keep you from work, meetings or getting things that you need?: 1   Physical Activity: Sufficiently Active (1/11/2022)    [facility]    Exercise Vital Sign     Days of Exercise per Week: 5 days     Minutes of Exercise per Session: 150+ min   Stress: No Stress Concern Present (1/11/2022)    [facility]    [employer] - Occupational Stress Questionnaire     Feeling of Stress : Not at all   Social Connections: Socially Isolated (11/25/2024)    [facility]    Social Connections     Do you often feel lonely or isolated from those around you?: 4   Interpersonal Safety: Patient Unable To Answer (11/16/2024)    Received from [facility]    Humiliation, Afraid, Rape, and Kick questionnaire     Fear of Current or Ex-Partner: Patient unable to answer     Emotionally Abused: Patient unable to answer     Physically Abused: Patient unable to answer     Sexually Abused: Patient unable to answer   Housing Stability: Low Risk  (11/25/2024)    Received from [facility]    Housing Stability     What is your housing situation today?: 1       ROS:   CONSTITUTIONAL:  No fevers or chills  EYES: negative for icterus  ENT:  negative for hearing loss, tinnitus and sore throat  RESPIRATORY:  negative for cough, sputum,  dyspnea  CARDIOVASCULAR:  negative for chest pain {Vascular Disease Manifestation:123265}  GASTROINTESTINAL:  negative for nausea, vomiting, diarrhea or constipation  GENITOURINARY:  negative for incontinence, dysuria, bladder emptying problems  HEME:  No easy bruising  INTEGUMENT:  negative for rash and pruritus  NEURO:  Negative for headache, seizure disorder  Allergies:   Allergies   Allergen Reactions    Levofloxacin      Other Reaction(s): Renal Failure    Hx of renal failure    Amoxicillin-Pot Clavulanate Rash     Patient reported this was a couple years ago and he did not need treatment for the rash. Just stopped taking the medication.    Bee Pollen Itching     Allergic Rhinitis, Itchy watery eyes    Pollen Extract Itching     Other Reaction(s): RUNNY NOSE    Seasonal Allergies: Allergic Rhinitis, Itchy watery eyes    Seasonal Allergies Itching     Allergic Rhinitis, Itchy watery eyes    Cefprozil GI Disturbance     Other Reaction(s): GI Upset    Clavulanic Acid Unknown     Other Reaction(s): Other (see comments)    Erythromycin      Other Reaction(s): GI UPSET, GI Upset    Gramineae Pollens Other (See Comments)    Sulfa Antibiotics      Other Reaction(s): *Unknown - Childhood Rxn    Macrolides And Ketolides Rash     Other Reaction(s): GI UPSET, UNKNOWN    Penicillins Rash     Medications:  Prescription Medications as of 3/24/2025         Rx Number Disp Refills Start End Last Dispensed Date Next Fill Date Owning Pharmacy    acetaminophen (TYLENOL) 500 MG tablet  -- -- 7/1/2023 --       Sig: Take 500 mg by mouth    Class: Historical    Route: Oral    buPROPion (WELLBUTRIN SR) 100 MG 12 hr tablet  -- --  --       Sig: Take 150 mg by mouth    Class: Historical    Route: Oral    empagliflozin (JARDIANCE) 10 MG TABS tablet  90 tablet 3 2/26/2025 --   [facility] - [place], [place] - [address]    Sig: Take 1 tablet (10 mg) by mouth daily.    Class: E-Prescribe    Route: Oral     "FLUoxetine (PROZAC) 20 MG capsule  -- -- 5/18/2023 --       Sig: TAKE 1 CAPSULE(20 MG) BY MOUTH EVERY MORNING    Class: Historical    Route: Oral    levETIRAcetam (KEPPRA) 500 MG tablet  -- -- 4/13/2023 --       Sig: Take 500 mg by mouth every 12 hours    Class: Historical    Route: Oral    mycophenolate (GENERIC EQUIVALENT) 250 MG capsule  -- -- 4/13/2023 --       Sig: Take 2 capsules by mouth every 12 hours    Class: Historical    Route: Oral    predniSONE (DELTASONE) 5 MG tablet  -- --  --       Sig: Take 10 mg by mouth daily    Class: Historical    Route: Oral    sodium bicarbonate 650 MG tablet  180 tablet 3 2/26/2025 --   [id number] - [place], [place] - [address] AT [place] [address]    Sig: Take 1 tablet (650 mg) by mouth 2 times daily.    Class: E-Prescribe    Route: Oral    Vitamin D3 (CHOLECALCIFEROL) 25 mcg (1000 units) tablet  90 tablet 3 10/16/2024 --   [id number] - [place], [place] - [address] AT [place] [address]    Sig: Take 1 tablet (25 mcg) by mouth daily.    Class: E-Prescribe    Route: Oral          Exam:     BP (!) 149/76 (BP Location: Right arm, Patient Position: Chair, Cuff Size: Adult Small)   Pulse 64   Temp 97.5  F (36.4  C) (Oral)   Resp 16   Ht 1.795 m (5' 10.67\")   Wt 57.1 kg (125 lb 14.4 oz)   SpO2 99%   BMI 17.72 kg/m    Appearance: {Distress levels:496229::\"in no apparent distress.\"}   Skin: {SKIN:149854}  Eyes:  no redness or discharge.  Sclera anicteric  Head and Neck: {JAUNDICE:259881:s}  Respiratory: easy respirations, no audible wheezing.  Abdomen: {ABDOMEN INSPECTION:602}, {ABDOMEN (MM):675242}   Extremities: {PULSE POSITIVES LIST:435678:s}, Edema, {EDEMA1:700738}  Neuro: {NEURO:485363}   Psychiatric: Normal mood and affect    Diagnostics:   No results found for this or any previous visit (from the past 4 weeks).  No results found for: \"CPRA\"       Again, thank you for allowing me to participate in the care of your " patient.        Sincerely,        Kalpana Royal MD, MD    Electronically signed

## 2025-03-24 NOTE — PROGRESS NOTES
Psychosocial Assessment For Kidney Transplantation  Patient Name/ Age: Matthew Arias 39 year old   Medical Record #: [id number]  Duration of Interview:  30min  Process:   Face-to-Face Interview                (counseling < 50%)   Present at Appointment: Matthew (Patient) and Jovana (Spouse)        : RENAE Dominguez Date:  March 24, 2025        Type of transplant: Kidney    Donor type:      Cadaver. Shared that he also has some friends/family interested in donation.    Prior Transplants:    Yes  - Liver (1/1/1986 and 1/1/2001).  Status of Transplant: Stable           Current Living Situation    Location:   [address] [place] [address]  With Whom:  Patient reported that he lives in a home (owns) in [place], [place] with his wife, Jovana, their four children: Nida (18 yrs old, moving out soon), Galilea (15 yrs old), Gio (14 yrs old), and Immanuel (10 yrs old), 3 dogs, 2 cats, and occasionally, his father-in-law/Jovana's dad.        Family/ Social Support:    Supports:  Available, Helpful - Identified his wife, Jovana, as his primary support person post-transplant. Jovana was present for today's visit and confirmed this plan.      Committed Relationship:     , Stable/Supportive. Reported that him and his wife have been together since the end of 2013.      Other Supports:    Available, Helpful - Identified his mom, Saul (lives in [place], [place]), his daughter, Nida, or Jovana's sister, Shirlene, as alternate supports as needed.        Activities/ Functional Ability    Current Level: Ambulatory and independent with ADL's. Does not use any DME at this time.      Transportation Drives self.        Vocational/Employment/Financial     Employment   Full Time/Leave of Absence. Has been on disability since 1/14/25.      Job Description   Employed with [employer].    Income   Salary/wages, SSDI, and Spouse's Income. Spouse works for [employer].     Insurance      At this time,  "patient can afford medication costs:  Yes  BCBS through wife's employer.        Medical Status    Current Mode of Treatment for ESRD Kidney Transplant. Not currently on dialysis.    Complications None reported or indicated at this time. Not a diabetic.        Behavioral    Tobacco Use: No. Denied current or history of smoking/tobacco use.  Chemical Dependency:  Endorsed very seldom alcohol use. Reported primarily drinking during special occasions/events or holidays. Last drink was 5 months ago. Denied history of UBALDO treatment.          Psychiatric Impairment:  Endorsed history of anxiety, depression, and medical related PTSD. Currently taking Wellbutrin and Fluoxetine (prescribed by PCP). States this is going well. Meets with Angeli Clark LP, LICSW at [facility] once a month for virtual therapy appointments. Reported that he has been working with her for 1 yr.  Discussed a long history of medical concerns that have \"taken away\" from big part of his life and that he has learned to cope with. Identified some passive SI (I.e. not being as afraid of death as he once was) over the years, but denied any intent or plans. Identified various motivators/reasons for living, however, spoke openly about the medical challenges he has endured and \"indifference\" around living/dying. Endorsed one prior hospitalization at [facility] (date unclear) for mental health concerns related to a brain aneurysm.       Reading Ability: Good  Education Level: Technical College Recent Legal History: No. Denied legal history.       Coping Style/Strategies: \"Hunting, fishing, collecting baseball cards/knives, taking my kids to hockey.\"        Ability to Adhere to Complex Medical Regime: Yes     Adherence History: Patient feels confident in their ability to manage their appointments and follow up with the doctor when needed. They report no concerns in maintaining this moving forward.          Education  _X_ Medicare  _X_ " Rehabilitation  _X_ Donor issues  _X_ Community resources  _X_ Post discharge housing  _X_ Financial resources  _X_ Medical insurance options  _X_ Psych adjustment  _X_ Family adjustment  _X_ Health Care Directive - None on file. Provided education and a blank handout to complete.    Psychosocial Risks of Transplant Reviewed and Discussed:  _X_ Increased stress related to emotional,            family, social, employment or financial           situation  _X_ Effect on work and/or disability benefits  _X_ Effect on future health and life           insurance  _X_ Transplant outcome expectations may           not be met  _X_ Mental Health Risks: anxiety,           depression, PTSD, guilt, grief and           chronic fatigue     Notable Items:   None noted.       Final Evaluation/Assessment   Patient appeared alert and oriented x4 with adequate insight and judgment. Behavior was appropriate during assessment and appeared to understand the risks and benefits of transplant. No major concerns expressed at this time regarding the transplant requirements.     Per assessment, patient appears to have adequate social supports, income, and insurance coverage. Patient is ambulatory and independent with his ADL's and IADL's. At this time, no major contraindications noted for transplant.     Provided patient with a handout on post-transplant expectations, information on Medicare ESRD, health care directive information, donor card, as well as 's contact information.  will continue to monitor for psychosocial supports, resources, and advocate on patient's behalf. SW encouraged patient to follow-up as needed for questions and concerns.       Recommendation  Acceptable    Selection Criteria Met:  Plan for support Yes   Chemical Dependence Yes   Smoking Yes   Mental Health Yes   Adequate Finances Yes    Signature: RENAE Dominguez [facility]   Title: Clinical

## 2025-03-24 NOTE — NURSING NOTE
"Kidney Transplant Evaluation     Participants were informed of the benefits of transplant as well as potential risks such as infection, cancer, and death.  The need for total adherence with immunosuppression medications and following transplant regimens was stressed.  The overall evaluation/approval/listing process was reviewed.        The patient was provided with the following documents:  What You Need to Know About a Kidney Transplant  Adult Kidney Transplant - A Guide for Patients  SRTR Data Sheet - Kidney  Brochure - Kidney Allocation  Brochure - Multiple Listing and Waiting Time Transfer  What Every Patient Needs to Know (UNOS)  UNOS Facts and Figures  Finding a Donor  KDPI Consent  Receipt of Information form    Signed the  Receipt of Information for Organ Transplant Recipient.\" He was provided transplant coordinator's business card and instructed to call with additional questions.      Summary    Team s concerns/comments: weight/nutritional status, liver/GI clearance and follow up EGD, needs to have up to date vaccinations due to splenectomy     Candidacy category: No data was found    Action/Plan: continue with evaluation    Expected Selection Meeting Discussion: 4/02/25    "

## 2025-03-24 NOTE — PROGRESS NOTES
TRANSPLANT NEPHROLOGY RECIPIENT EVALUATION NOTE    Assessment and Plan:  # Kidney Transplant Evaluation: Patient is a {desc.:319028} candidate overall. Benefits of a living donor transplant were discussed.    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 GFR 26 with Cystatin C 22.    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 Good function, follows annually.    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  Patient's overall evaluation will be discussed in the Transplant Program's regular meeting with a final recommendation on the patients suitability for transplant to be made at that time.    Evaluation:  Matthew Arias was seen in consultation at the request of Dr. Kalpana Royal for evaluation as a potential kidney transplant recipient.    Reason for Visit:  Matthew Arias is a 39 year old male with CKD from CNI toxicity, who presents for kidney transplant  evaluation.    History of Present Illness:   ***         Kidney Disease Hx:        ***Scr fluctuates between 2.6-3s eGFR was 28ml/min by cystatin C in 2022 , it was 2.7 eGFR 23 in October 2023 a little lower than creatinine of 2.8 with eGFR which was 28  Recurrent TRISTON episodes  B/l multiple renal cysts       Kidney Disease Dx: CNI toxicity       Biopsy Proven: Yes; ***         On Dialysis: No       Primary Nephrologist: Mathew       H/o Kidney Stones: No       H/o Recurrent/Frequent UTI: No         Diabetic Hx: None           Cardiac/Vascular Disease Risk Factors:        Cardiac Risk Factors: {Cardiac Risk Factors:787987}       Known CAD: {YES WITH WILD CARD/NO:10827596}       Known PAD/Caludication Symptoms: {YES WITH WILD CARD/NO:32622163}       Known Heart Failure: {Cardiac Risk Factors:888581}       Arrhythmia: No       Pulmonary Hypertension: {YES WITH WILD CARD/NO:13386411}       Valvular Disease: {YES WITH WILD CARD/NO:04897880}       Other: {Cardiac Risk Factors:299453}         Viral Serology Status       CMV IgG Antibody: Negative       EBV IgG Antibody: Positive         Volume Status/Weight:        Volume status: { :555678}       Weight:  {FV RENAL TX Recip Eval Weight:001983}       BMI: There is no height or weight on file to calculate BMI.         Functional Capacity/Frailty:         Stopped working at [employer] due to increased fatigue, previously worked on his feet.  Walks 3 dogs, able to go 1.5miles with each dog. No formal exercise.     Fatigue/Decreased Energy: [] No [x] Yes    Chest Pain or SOB with Exertion: [x] No [] Yes    Significant Weight Change: [] No [x] Yes Down -10 lbs from    Nausea, Vomiting or Diarrhea: [x] No [] Yes     Fever, Sweats or Chills:  [x] No [] Yes    Leg Swelling [x] No [] Yes        History of Cancer: None    Other Significant Medical Issues: {None/***:508963}      Allergy Testing Questions:   Medication that caused a reaction Penicillin (Amoxicillin, Amoxicillin with  clavulanic acid, Dicloxacillin) and Sufla Drugs (Sufla/TMP or Bactrim) ***   Antibiotics used that didn't give an allergic reaction?  Doxycycline or Tetracycline and Azithromycin    COVID Vaccination Up To Date: { :269324}    Potential Living Kidney Donors: Yes    Review of Systems:  A comprehensive review of systems was obtained and negative, except as noted in the HPI or PMH.    Past Medical History:   Medical record was reviewed and PMH was discussed with patient and noted below.  No past medical history on file.    Past Social History:   No past surgical history on file.  Personal history of bleeding or anesthesia problems: No    Family History:  No family history on file.    Personal History:   Social History     Socioeconomic History    Marital status: Patient Declined     Spouse name: Not on file    Number of children: Not on file    Years of education: Not on file    Highest education level: Not on file   Occupational History    Not on file   Tobacco Use    Smoking status: Never    Smokeless tobacco: Never   Vaping Use    Vaping status: Never Used   Substance and Sexual Activity    Alcohol use: Not Currently     Comment: Last drink 3 months ago    Drug use: Never    Sexual activity: Not on file   Other Topics Concern    Not on file   Social History Narrative    Not on file     Social Drivers of Health     Financial Resource Strain: Medium Risk (11/25/2024)    Received from [facility]    Financial Resource Strain     Difficulty of Paying Living Expenses: 1     Difficulty of Paying Living Expenses: 2   Food Insecurity: No Food Insecurity (11/25/2024)    Received from [facility]    Food Insecurity     Do you worry your food will run out before you are able to buy more?: 1   Transportation Needs: No Transportation Needs (11/25/2024)    Received from [facility]    Transportation Needs     Does lack of  transportation keep you from medical appointments?: 1     Does lack of transportation keep you from work, meetings or getting things that you need?: 1   Physical Activity: Sufficiently Active (1/11/2022)    [facility]    Exercise Vital Sign     Days of Exercise per Week: 5 days     Minutes of Exercise per Session: 150+ min   Stress: No Stress Concern Present (1/11/2022)    Received from [facility], [facility]    [employer] - Occupational Stress Questionnaire     Feeling of Stress : Not at all   Social Connections: Socially Isolated (11/25/2024)    Received from [facility]    Social Connections     Do you often feel lonely or isolated from those around you?: 4   Interpersonal Safety: Patient Unable To Answer (11/16/2024)    Received from [facility]    Humiliation, Afraid, Rape, and Kick questionnaire     Fear of Current or Ex-Partner: Patient unable to answer     Emotionally Abused: Patient unable to answer     Physically Abused: Patient unable to answer     Sexually Abused: Patient unable to answer   Housing Stability: Low Risk  (11/25/2024)    Received from [facility]    Housing Stability     What is your housing situation today?: 1       Allergies:  Allergies   Allergen Reactions    Levofloxacin      Other Reaction(s): Renal Failure    Hx of renal failure    Amoxicillin-Pot Clavulanate Rash     Patient reported this was a couple years ago and he did not need treatment for the rash. Just stopped taking the medication.    Bee Pollen Itching     Allergic Rhinitis, Itchy watery eyes    Pollen Extract Itching     Other Reaction(s): RUNNY NOSE    Seasonal Allergies: Allergic Rhinitis, Itchy watery eyes    Seasonal Allergies Itching     Allergic Rhinitis, Itchy watery eyes    Cefprozil GI Disturbance     Other Reaction(s): GI Upset    Clavulanic Acid Unknown     Other Reaction(s): Other (see  "comments)    Erythromycin      Other Reaction(s): GI UPSET, GI Upset    Gramineae Pollens Other (See Comments)    Sulfa Antibiotics      Other Reaction(s): *Unknown - Childhood Rxn    Macrolides And Ketolides Rash     Other Reaction(s): GI UPSET, UNKNOWN    Penicillins Rash       Medications:  Current Outpatient Medications   Medication Sig Dispense Refill    acetaminophen (TYLENOL) 500 MG tablet Take 500 mg by mouth      buPROPion (WELLBUTRIN SR) 100 MG 12 hr tablet Take 150 mg by mouth      empagliflozin (JARDIANCE) 10 MG TABS tablet Take 1 tablet (10 mg) by mouth daily. 90 tablet 3    FLUoxetine (PROZAC) 20 MG capsule TAKE 1 CAPSULE(20 MG) BY MOUTH EVERY MORNING      levETIRAcetam (KEPPRA) 500 MG tablet Take 500 mg by mouth every 12 hours      mycophenolate (GENERIC EQUIVALENT) 250 MG capsule Take 2 capsules by mouth every 12 hours      omeprazole (PRILOSEC) 40 MG DR capsule Take 1 capsule by mouth every 12 hours (Patient not taking: Reported on 2/26/2025)      predniSONE (DELTASONE) 5 MG tablet Take 10 mg by mouth daily      sodium bicarbonate 650 MG tablet Take 1 tablet (650 mg) by mouth 2 times daily. 180 tablet 3    Vitamin D3 (CHOLECALCIFEROL) 25 mcg (1000 units) tablet Take 1 tablet (25 mcg) by mouth daily. 90 tablet 3     No current facility-administered medications for this visit.       Vitals:  BP (!) 149/76 (BP Location: Right arm, Patient Position: Chair, Cuff Size: Adult Small)   Pulse 64   Temp 97.5  F (36.4  C) (Oral)   Resp 16   Ht 1.795 m (5' 10.67\")   Wt 57.1 kg (125 lb 14.4 oz)   SpO2 99%   BMI 17.72 kg/m      Exam:  GENERAL APPEARANCE: alert and no distress  HENT: mouth without ulcers or lesions  RESP: lungs clear to auscultation - no rales, rhonchi or wheezes  CV: regular rhythm, normal rate, no rub, no murmur  EDEMA: no LE edema bilaterally  ABDOMEN: soft, nondistended, nontender, bowel sounds normal  MS: extremities normal - no gross deformities noted, no evidence of inflammation in " joints, no muscle tenderness  SKIN: no rash  DIALYSIS ACCESS: none    Results:   No results found for this or any previous visit (from the past 2 weeks).         98 - 107 mmol/L    Glucose 74 70 - 99 mg/dL    Alkaline Phosphatase 224 (H) 40 - 150 U/L    AST 47 (H) 0 - 45 U/L    ALT 42 0 - 70 U/L    Protein Total 7.4 6.4 - 8.3 g/dL    Albumin 4.0 3.5 - 5.2 g/dL    Bilirubin Total 0.3 <=1.2 mg/dL   Cardiolipin Nimisha IgG and IgM [LAB 6836]    Collection Time: 03/24/25  4:20 PM   Result Value Ref Range    Cardiolipin Nimisha IgG Instrument Value 15.0 (H) <10.0 GPL-U/mL    Cardiolipin Antibody IgG Weak Positive (A) Negative    Cardiolipin Nimisha IgM Instrument Value 3.5 <10.0 MPL-U/mL    Cardiolipin Antibody IgM Negative Negative   Factor 2 and 5 mutation analysis    Collection Time: 03/24/25  4:20 PM   Result Value Ref Range    METHODOLOGY       The regions of genomic DNA containing the F5 gene mutation R506Q(1691G>A) and the Factor 2 (Prothrombin [id number]) gene mutation were simultaneously amplified using the polymerase chain reaction. The amplified products were digested with restriction endonuclease TaqI and products were analyzed by gel electrophoresis.        RESULTS         Factor V 1691G>A ([place])  RESULTS:  Mutation analyzed: 1691G>A  Factor V 1691G>A ([place])  Interpretation:  ABSENT  Factor V 1691G>A ([place]) mutation  genotype:  G/G    FACTOR 2/PROTHROMBIN RESULTS:  Mutation analyzed: 33349W>A  Factor 2 Mutation Interpretation:  ABSENT  Factor 2 Mutation genotype:  G/G        INTERPRETATION       The patient is negative for the Factor V 1691G>A ([place]) and negative for the Factor 2 mutation.  (Electronically signed by: Jerel Gibson MD March 27, 2025 3:24 PM)      COMMENTS       If a patient is the recipient of an allogeneic bone marrow transplant, this test must be done on a pre-transplant sample or buccal swab.  A previous allogeneic bone marrow transplant will interfere with test results.  Call the [facility] ([phone]) for instructions on sample collection for these patients.      DISCLAIMER       This test was developed and its performance  characteristics determined by [facility]. It has not been cleared or approved by the FDA. The laboratory is regulated under CLIA as qualified to perform high-complexity testing. This test is used for clinical purposes. It should not be regarded as investigational or for research.    A resident/fellow in an accredited training program was involved in the selection of testing, review of laboratory data, and/or interpretation of this case.  I, as the senior physician, attest that I: (i) confirmed appropriate testing, (ii) examined the relevant raw data for the specimen(s); and (iii) rendered or confirmed the interpretation(s).        FACTOR 2 INTERPRETATION         Factor 2 Mutation Interpretation:  ABSENT      FACTOR V INTERPRETATION       Factor V 1691G>A ([place]iden)  Interpretation:  ABSENT      Specimen Description       Blood: ACD     INR [[id number]]    Collection Time: 03/24/25  4:20 PM   Result Value Ref Range    INR 1.04 0.85 - 1.15   Lupus Anticoagulant Panel [CVR5901]    Collection Time: 03/24/25  4:20 PM   Result Value Ref Range    Thrombin Time 19.4 (H) 13.0 - 19.0 Seconds    PTT Ratio 1.08 <1.30    DRVVT Screen Ratio 0.87 <1.08    Lupus Result Negative Negative    Lupus Interpretation       The INR is normal.  APTT ratio is normal.    DRVVT Screen ratio is normal.  Thrombin time is prolonged.  NEGATIVE TEST; A LUPUS ANTICOAGULANT WAS NOT DETECTED IN THIS SPECIMEN WITHIN THE LIMITS OF THE TESTING REPERTOIRE.  If the clinical picture is strongly suggestive of an antiphospholipid syndrome, recommend anticardiolipin and beta-2-glycoprotein (IgG and IgM) antibody tests.  Causes of a prolonged Thrombin Time include: hypofibrinogenemia, dysfibrinogenemia, elevated fibrin degradation products (e.g. D-dimer),  anticoagulants (e.g. heparin and direct thrombin inhibitors), and inhibitors.    Rachael Dawson MD, PhD  [facility]          Signout Location if Remote Report  signed out at: [email or web address]    Partial thromboplastin time [LAB56]    Collection Time: 03/24/25  4:20 PM   Result Value Ref Range    aPTT 32 22 - 38 Seconds   Thrombin time [DOD764]    Collection Time: 03/24/25  4:20 PM   Result Value Ref Range    Thrombin Time 19.4 (H) 13.0 - 19.0 Seconds   CMV Antibody IgG [[id number]]    Collection Time: 03/24/25  4:20 PM   Result Value Ref Range    CMV Nimisha IgG Instrument Value 0.45 <0.60 U/mL    CMV Antibody IgG No detectable antibody. No detectable antibody.    EBV Capsid Antibody IgG [[id number]]    Collection Time: 03/24/25  4:20 PM   Result Value Ref Range    EBV Capsid Nimisha IgG Instrument Value 460.0 (H) <18.0 U/mL    EBV Capsid Antibody IgG Positive (A) No detectable antibody.   Hepatitis B core antibody [[id number]]    Collection Time: 03/24/25  4:20 PM   Result Value Ref Range    Hepatitis B Core Antibody Total Nonreactive Nonreactive   Hepatitis B Surface Antibody [[id number]]    Collection Time: 03/24/25  4:20 PM   Result Value Ref Range    Hepatitis B Surface Antibody Nonreactive     Hepatitis B Surface Antibody Instrument Value <3.50 <8.5 m[IU]/mL   Hepatitis B surface antigen [[id number]]    Collection Time: 03/24/25  4:20 PM   Result Value Ref Range    Hepatitis B Surface Antigen Nonreactive Nonreactive   Hepatitis C antibody [AMI013]    Collection Time: 03/24/25  4:20 PM   Result Value Ref Range    Hepatitis C Antibody Nonreactive Nonreactive   HIV Antigen Antibody Combo Pretransplant Cascade    Collection Time: 03/24/25  4:20 PM   Result Value Ref Range    HIV Antigen Antibody Combo Pretransplant Nonreactive Nonreactive   Treponema Abs w Reflex to RPR and Titer [[id number]]    Collection Time: 03/24/25  4:20 PM   Result Value Ref Range    Treponema Antibody Total Nonreactive Nonreactive   Varicella Zoster Virus Antibody IgG [[id number]]    Collection Time: 03/24/25  4:20 PM   Result Value Ref Range    Varicella Zoster Virus Antibody IgG Interpretation Positive     Varicella Zoster Virus  Antibody IgG Instrument Value 3.26 <1.00 S/CO   Cystatin C with GFR    Collection Time: 03/24/25  4:20 PM   Result Value Ref Range    Cystatin C 2.8 (H) 0.6 - 1.0 mg/L    GFR Calculated with Cystatin C 22 (L) >=60 mL/min/1.73m2   Adult Type and Screen    Collection Time: 03/24/25  4:20 PM   Result Value Ref Range    ABO/RH(D) O POS     Antibody Screen Negative Negative    SPECIMEN EXPIRATION DATE [id number]    CBC with platelets and differential    Collection Time: 03/24/25  4:20 PM   Result Value Ref Range    WBC Count 10.0 4.0 - 11.0 10e3/uL    RBC Count 4.10 (L) 4.40 - 5.90 10e6/uL    Hemoglobin 11.6 (L) 13.3 - 17.7 g/dL    Hematocrit 36.6 (L) 40.0 - 53.0 %    MCV 89 78 - 100 fL    MCH 28.3 26.5 - 33.0 pg    MCHC 31.7 31.5 - 36.5 g/dL    RDW 14.5 10.0 - 15.0 %    Platelet Count 391 150 - 450 10e3/uL    % Neutrophils 43 %    % Lymphocytes 27 %    % Monocytes 12 %    % Eosinophils 17 %    % Basophils 1 %    % Immature Granulocytes 0 %    NRBCs per 100 WBC 0 <1 /100    Absolute Neutrophils 4.3 1.6 - 8.3 10e3/uL    Absolute Lymphocytes 2.7 0.8 - 5.3 10e3/uL    Absolute Monocytes 1.2 0.0 - 1.3 10e3/uL    Absolute Eosinophils 1.7 (H) 0.0 - 0.7 10e3/uL    Absolute Basophils 0.1 0.0 - 0.2 10e3/uL    Absolute Immature Granulocytes 0.0 <=0.4 10e3/uL    Absolute NRBCs 0.0 10e3/uL   Quantiferon TB Gold Plus Grey Tube    Collection Time: 03/24/25  4:20 PM    Specimen: Arm, Left; Blood   Result Value Ref Range    Quantiferon Nil Tube 0.03 IU/mL   Quantiferon TB Gold Plus Green Tube    Collection Time: 03/24/25  4:20 PM    Specimen: Arm, Left; Blood   Result Value Ref Range    Quantiferon TB1 Tube 0.11 IU/mL   Quantiferon TB Gold Plus Yellow Tube    Collection Time: 03/24/25  4:20 PM    Specimen: Arm, Left; Blood   Result Value Ref Range    Quantiferon TB2 Tube 0.23    Quantiferon TB Gold Plus Purple Tube    Collection Time: 03/24/25  4:20 PM    Specimen: Arm, Left; Blood   Result Value Ref Range    Quantiferon Mitogen  10.00 IU/mL   Phosphorus    Collection Time: 03/24/25  4:20 PM   Result Value Ref Range    Phosphorus 4.7 (H) 2.5 - 4.5 mg/dL   Iron & Iron Binding Capacity    Collection Time: 03/24/25  4:20 PM   Result Value Ref Range    Iron 111 61 - 157 ug/dL    Iron Binding Capacity 293 240 - 430 ug/dL    Iron Sat Index 38 15 - 46 %   Quantiferon TB Gold Plus    Collection Time: 03/24/25  4:20 PM    Specimen: Arm, Left; Blood   Result Value Ref Range    Quantiferon-TB Gold Plus Negative Negative    TB1 Ag minus Nil Value 0.08 IU/mL    TB2 Ag minus Nil Value 0.20 IU/mL    Mitogen minus Nil Result 9.97 IU/mL    Nil Result 0.03 IU/mL   HLA-ABC Typing High Resolution    Collection Time: 03/24/25  4:20 PM   Result Value Ref Range    ABTEST METHOD NGS     hiresA-1 A*32:01     hiresA-1Equiv 32     hiresA-2 A*68:01     hiresA-2Equiv 68     hiresB-1 B*14:01     hiresB-1Equiv 64     hiresB-2 B*15:01     hiresB-2Equiv 62     hiresC-1 C*03:04     hiresC-1Equiv 10     hiresC-2 C*08:02     hiresC-2Equiv 8     hiresBw-1 Bw*6    HLA-DR Typing High Resolution    Collection Time: 03/24/25  4:20 PM   Result Value Ref Range    DRhiresTestM NGS     hiresDPA1-1 DPA1*01:03     hiresDPB1-1 DPB1*04:01     hiresDQA1-1 DQA1*03:01     hiresDQA1-2 DQA1*05:05     hiresDQB1-1 DQB1*03:01     hiresDQB1-1Equiv 7     hiresDQB1-2 DQB1*03:02     hiresDQB1-2Equiv 8     hiresDRB1-1 DRB1*04:03     hiresDRB1-1Equiv 4     hiresDRB1-2 DRB1*11:04     hiresDRB1-2Equiv 11     hiresDRB3-1 DRB3*02:02     hiresDRB3-1Equiv 52     hiresDRB4-2 DRB4*01:03     hiresDRB4-2Equiv 53    ABO and Rh    Collection Time: 03/24/25  4:27 PM   Result Value Ref Range    ABO/RH(D) O POS     SPECIMEN EXPIRATION DATE [id number]    Protein  random urine    Collection Time: 03/24/25  4:35 PM   Result Value Ref Range    Total Protein Urine mg/dL 17.6   mg/dL    Total Protein Urine mg/mg Creat 0.22 (H) 0.00 - 0.20 mg/mg Cr    Creatinine Urine mg/dL 79.9 mg/dL   Albumin Random Urine Quantitative  with Creat Ratio    Collection Time: 03/24/25  4:35 PM   Result Value Ref Range    Creatinine Urine mg/dL 79.5 mg/dL    Albumin Urine mg/L 82.7 mg/L    Albumin Urine mg/g Cr 104.03 (H) 0.00 - 17.00 mg/g Cr   UA with Microscopic reflex to Culture    Collection Time: 03/24/25  4:35 PM    Specimen: Urine, Midstream   Result Value Ref Range    Color Urine Light Yellow Colorless, Straw, Light Yellow, Yellow    Appearance Urine Clear Clear    Glucose Urine Negative Negative mg/dL    Bilirubin Urine Negative Negative    Ketones Urine Negative Negative mg/dL    Specific Gravity Urine 1.014 1.003 - 1.035    Blood Urine Negative Negative    pH Urine 6.0 5.0 - 7.0    Protein Albumin Urine 10 (A) Negative mg/dL    Urobilinogen Urine Normal Normal mg/dL    Nitrite Urine Negative Negative    Leukocyte Esterase Urine Negative Negative    RBC Urine <1 <=2 /HPF    WBC Urine <1 <=5 /HPF    Squamous Epithelials Urine <1 <=1 /HPF            urine    Collection Time: 03/24/25  4:35 PM   Result Value Ref Range    Total Protein Urine mg/dL 17.6   mg/dL    Total Protein Urine mg/mg Creat 0.22 (H) 0.00 - 0.20 mg/mg Cr    Creatinine Urine mg/dL 79.9 mg/dL   Albumin Random Urine Quantitative with Creat Ratio    Collection Time: 03/24/25  4:35 PM   Result Value Ref Range    Creatinine Urine mg/dL 79.5 mg/dL    Albumin Urine mg/L 82.7 mg/L    Albumin Urine mg/g Cr 104.03 (H) 0.00 - 17.00 mg/g Cr   UA with Microscopic reflex to Culture    Collection Time: 03/24/25  4:35 PM    Specimen: Urine, Midstream   Result Value Ref Range    Color Urine Light Yellow Colorless, Straw, Light Yellow, Yellow    Appearance Urine Clear Clear    Glucose Urine Negative Negative mg/dL    Bilirubin Urine Negative Negative    Ketones Urine Negative Negative mg/dL    Specific Gravity Urine 1.014 1.003 - 1.035    Blood Urine Negative Negative    pH Urine 6.0 5.0 - 7.0    Protein Albumin Urine 10 (A) Negative mg/dL    Urobilinogen Urine Normal Normal mg/dL    Nitrite Urine Negative Negative    Leukocyte Esterase Urine Negative Negative    RBC Urine <1 <=2 /HPF    WBC Urine <1 <=5 /HPF    Squamous Epithelials Urine <1 <=1 /HPF         I spent a total of 75 minutes on the date of the encounter doing chart review, performing a history and physical exam, completing documentation and any further activities as noted above.

## 2025-03-25 LAB
ATRIAL RATE - MUSE: 82 BPM
CARDIOLIPIN IGG SER IA-ACNC: 15 GPL-U/ML
CARDIOLIPIN IGG SER IA-ACNC: ABNORMAL
CARDIOLIPIN IGM SER IA-ACNC: 3.5 MPL-U/ML
CARDIOLIPIN IGM SER IA-ACNC: NEGATIVE
CMV IGG SERPL IA-ACNC: 0.45 U/ML
CMV IGG SERPL IA-ACNC: NORMAL
DIASTOLIC BLOOD PRESSURE - MUSE: NORMAL MMHG
DRVVT SCREEN RATIO: 0.87
EBV VCA IGG SER IA-ACNC: 460 U/ML
EBV VCA IGG SER IA-ACNC: POSITIVE
GAMMA INTERFERON BACKGROUND BLD IA-ACNC: 0.03 IU/ML
HBV CORE AB SERPL QL IA: NONREACTIVE
HBV SURFACE AB SERPL IA-ACNC: <3.5 M[IU]/ML
HBV SURFACE AB SERPL IA-ACNC: NONREACTIVE M[IU]/ML
HBV SURFACE AG SERPL QL IA: NONREACTIVE
INTERPRETATION ECG - MUSE: NORMAL
LA PPP-IMP: NEGATIVE
LOCATION OF TASK: ABNORMAL
LUPUS INTERPRETATION: ABNORMAL
M TB IFN-G BLD-IMP: NEGATIVE
M TB IFN-G CD4+ BCKGRND COR BLD-ACNC: 9.97 IU/ML
MITOGEN IGNF BCKGRD COR BLD-ACNC: 0.08 IU/ML
MITOGEN IGNF BCKGRD COR BLD-ACNC: 0.2 IU/ML
P AXIS - MUSE: 72 DEGREES
PR INTERVAL - MUSE: 162 MS
PTT RATIO: 1.08
QRS DURATION - MUSE: 100 MS
QT - MUSE: 368 MS
QTC - MUSE: 429 MS
QUANTIFERON MITOGEN: 10 IU/ML
QUANTIFERON NIL TUBE: 0.03 IU/ML
QUANTIFERON TB1 TUBE: 0.11 IU/ML
QUANTIFERON TB2 TUBE: 0.23
R AXIS - MUSE: 92 DEGREES
SYSTOLIC BLOOD PRESSURE - MUSE: NORMAL MMHG
T AXIS - MUSE: 80 DEGREES
THROMBIN TIME: 19.4 SECONDS (ref 13–19)
THROMBIN TIME: 19.4 SECONDS (ref 13–19)
VENTRICULAR RATE- MUSE: 82 BPM
VZV IGG SER QL IA: 3.26 S/CO
VZV IGG SER QL IA: POSITIVE

## 2025-03-25 NOTE — PATIENT INSTRUCTIONS
Higher calorie protein/nutrition drinks (>200 calories per bottle):    Ensure Plus: 350 calories, 16 g protein  Ensure Complete: 350 calories, 30 g protein  Boost Original: 240 calories, 10 g protein  [employer] Breakfast Essentials: 240 calories, 10 g protein  [employer] Core Power Elite: 240 calories, 42 g protein  [facility] Protein (in refrigerator smoothie section): approx 350 calories, 30 g protein

## 2025-03-27 LAB
A*LOCUS SEROLOGIC EQUIVALENT: 32
A*SEROLOGIC EQUIVALENT: 68
ABTEST METHOD: NORMAL
B*LOCUS SEROLOGIC EQUIVALENT: 64
B*SEROLOGIC EQUIVALENT: 62
BW-1: NORMAL
C*LOCUS SEROLOGIC EQUIVALENT: 10
C*SEROLOGIC EQUIVALENT: 8
DQB1*LOCUS SEROLOGIC EQUIVALENT: 7
DQB1*SEROLOGIC EQUIVALENT: 8
DRB1*LOCUS SEROLOGIC EQUIVALENT: 4
DRB1*SEROLOGIC EQUIVALENT: 11
DRB3*LOCUS SEROLOGIC EQUIVALENT: 52
DRB4*SEROLOGIC EQUIVALENT: 53
DRSSO TEST METHOD: NORMAL
HLA-A HIGH RES: NORMAL
HLA-A HIGH RES: NORMAL
HLA-B HIGH RES: NORMAL
HLA-B HIGH RES: NORMAL
HLA-CW HIGH RES: NORMAL
HLA-CW HIGH RES: NORMAL
HLA-DPA1 HIGH RES: NORMAL
HLA-DPB1 HIGH RES: NORMAL
HLA-DQA1 HIGH RES: NORMAL
HLA-DQA1 HIGH RES: NORMAL
HLA-DQB1 HIGH RES: NORMAL
HLA-DQB1 HIGH RES: NORMAL
HLA-DRB1 HIGH RES: NORMAL
HLA-DRB1 HIGH RES: NORMAL
HLA-DRB3 HIGH RES: NORMAL
HLA-DRB4 HIGH RES: NORMAL

## 2025-04-02 ENCOUNTER — TELEPHONE (OUTPATIENT)
Dept: TRANSPLANT | Facility: CLINIC | Age: 40
End: 2025-04-02
Payer: COMMERCIAL

## 2025-04-02 NOTE — TELEPHONE ENCOUNTER
Provider Call: General  Route to LPN    Reason for call: Call from [place] coordinator from [place]- they follow pt there for his liver tx and we are evaluating for kidney transplant  They wonder if we can help them on doing his liver biopsy here so he does not have to drive to [place]?  Give her a call back to review     Call back needed? Yes    Return Call Needed  Same as documented in contacts section  When to return call?: Same day: Route High Priority

## 2025-04-02 NOTE — TELEPHONE ENCOUNTER
PCS spoke with Mary, LT RNCC at [place].  Matthew's transplant hepatologist completed a fibroscan showing increased fatty liver. Transplant hepatology recommends liver transplant bx. Matthew would like to complete that at [facility], rather than traveling back to [place].   PCS provided contact information for [facility] IR team, suggested reaching out to confirm if they will accept orders for liver tx bx from outside provider.   Mary will check with [facility] IR and call back to [facility] if unable to follow that avenue.

## 2025-04-07 ENCOUNTER — VIRTUAL VISIT (OUTPATIENT)
Dept: TRANSPLANT | Facility: CLINIC | Age: 40
End: 2025-04-07
Payer: COMMERCIAL

## 2025-04-07 DIAGNOSIS — Z76.82 ORGAN TRANSPLANT CANDIDATE: Primary | ICD-10-CM

## 2025-04-07 NOTE — GROUP NOTE
Date: 4/7/2025    Scheduled Start Time:  8:00 AM   Scheduled End Time:  9:10 AM    Department: [facility] Transplant Clinic    Facilitator(s): Tata Aaron RN    Documentation:  Solid Organ Transplant Education      Patient attended the pre-transplant patient education class today. The [facility] website pre-transplant modules were viewed:     Content reviewed:  Living Donation and how to access that program  Paired exchange  Kidney Donor Profile Index (KDPI)  Waiting list issues (right to decline without penalty, high PHS risk donors, what to expect when called with an offer)  Hepatitis C Donor Acceptance education and risks  Hospital experience, length of stay, need to stay locally post-discharge (2-4 weeks)    Surgical complications with video                      Post-surgery lifting and driving restrictions  Post-transplant routines, frequency of lab work and clinic visits  Role of Transplant Coordinator    Participants were informed of the benefits of transplant as well as potential risks such as infection, cancer, and death. The need for total adherence with immunosuppression medications and following transplant regimens was stressed.  The overall evaluation/approval/listing process was reviewed.          Patient:   Matthew [facility]    Transplant Episode(s):  Kidney Transplant Evaluation - 3/24/2025    Matthew was actively engaged in transplant education class, he asked good questions.

## 2025-04-09 ENCOUNTER — PATIENT OUTREACH (OUTPATIENT)
Dept: NEPHROLOGY | Facility: CLINIC | Age: 40
End: 2025-04-09
Payer: COMMERCIAL

## 2025-04-09 NOTE — PROGRESS NOTES
Neph Tracking Flowsheet Last Filled Values       Kidney Smart CKD Basics Referral 01/15/25    Kidney Smart CKD Basics Complete 02/05/25    Kidney Smart Modality Education Referral 01/15/25    Patient's Referral Dates Auto Populate Patient's Referral Dates    Journey Referral 1/15/25    Transplant Evaluation Referral 2/26/25

## 2025-04-16 ENCOUNTER — TELEPHONE (OUTPATIENT)
Dept: TRANSPLANT | Facility: CLINIC | Age: 40
End: 2025-04-16
Payer: COMMERCIAL

## 2025-04-17 ENCOUNTER — LAB REQUISITION (OUTPATIENT)
Dept: LAB | Facility: CLINIC | Age: 40
End: 2025-04-17
Payer: COMMERCIAL

## 2025-04-17 NOTE — TELEPHONE ENCOUNTER
Called pt and reached his VM recording stating his mailbox is full and could not accept any messages.

## 2025-04-22 PROBLEM — G40.401 EPILEPTIC GRAND MAL STATUS (H): Status: ACTIVE | Noted: 2019-01-01

## 2025-04-22 PROBLEM — A04.5 CAMPYLOBACTER GASTROENTERITIS: Status: ACTIVE | Noted: 2024-11-01

## 2025-04-24 ENCOUNTER — TELEPHONE (OUTPATIENT)
Dept: TRANSPLANT | Facility: CLINIC | Age: 40
End: 2025-04-24
Payer: COMMERCIAL

## 2025-04-24 DIAGNOSIS — A04.5 CAMPYLOBACTER GASTROENTERITIS: ICD-10-CM

## 2025-04-24 NOTE — LETTER
04/24/25        Matthew Arias  [address]  [place] [address]        Dear Matthew,    It was a pleasure to see you recently for consideration of kidney transplantation. Your pre-transplant evaluation results were reviewed at our Multidisciplinary Selection Committee. The committee is requesting the following items are completed before determining your candidacy:     No tasks found.       For any questions, please contact the Transplant Office at [phone].      Sincerely,      Solid Organ Transplant  [facility], [facility]

## 2025-04-24 NOTE — TELEPHONE ENCOUNTER
Called patient again today reached his VM recording stating that his mailbox is full and could not accept any messages.

## 2025-04-28 ENCOUNTER — TELEPHONE (OUTPATIENT)
Dept: TRANSPLANT | Facility: CLINIC | Age: 40
End: 2025-04-28
Payer: COMMERCIAL

## 2025-04-28 ENCOUNTER — MYC MEDICAL ADVICE (OUTPATIENT)
Dept: INTERVENTIONAL RADIOLOGY/VASCULAR | Facility: CLINIC | Age: 40
End: 2025-04-28
Payer: COMMERCIAL

## 2025-04-28 DIAGNOSIS — K20.0 EOSINOPHILIC ESOPHAGITIS: ICD-10-CM

## 2025-04-28 DIAGNOSIS — A04.5 CAMPYLOBACTER GASTROENTERITIS: ICD-10-CM

## 2025-04-28 DIAGNOSIS — Z76.82 ORGAN TRANSPLANT CANDIDATE: Primary | ICD-10-CM

## 2025-04-28 DIAGNOSIS — N18.9 CHRONIC KIDNEY DISEASE (CKD): ICD-10-CM

## 2025-04-28 DIAGNOSIS — Z94.4 HISTORY OF LIVER TRANSPLANT (H): ICD-10-CM

## 2025-04-28 DIAGNOSIS — N18.4 STAGE 4 CHRONIC KIDNEY DISEASE (H): ICD-10-CM

## 2025-04-28 DIAGNOSIS — Z01.818 PRE-TRANSPLANT EVALUATION FOR KIDNEY TRANSPLANT: ICD-10-CM

## 2025-04-28 DIAGNOSIS — Z90.81 ACQUIRED ABSENCE OF SPLEEN: ICD-10-CM

## 2025-04-28 DIAGNOSIS — Z01.810 PRE-OPERATIVE CARDIOVASCULAR EXAMINATION: ICD-10-CM

## 2025-04-28 DIAGNOSIS — N28.1 RENAL CYST: ICD-10-CM

## 2025-04-28 NOTE — TELEPHONE ENCOUNTER
Contacted patient to review outcome of selection committee meeting (See selection committee encounter). Reviewed again today with patient that I have not been able to reach him per phone because his voicemail box was full and he did not .  Explained our Office does a lot of communication over the phone and instructed patient to keep his voicemail box available - pt expressed very good understanding and has emptied his VM box today.   Explained to patient that he needs to complete all components of the evaluation to be eligible for active status on the waiting list or to proceed with a live donor kidney transplant.   Reviewed next steps based on outcomes: Pt to let me know when his GFR is 20 or less.  Pt understands he will not be listed until his GFR is 20 or less.  to call pt to make appts for exercise stress test, Allergist provider appt to assess his eosinophilia, dermatology provider for routine skin screening, bilateral renal US to assess numerous cysts and Infectious Disease for vaccination assessment with regards to patient being post splenectomy, Pt will repeat cardiolipin IgG and IgM with lab draw in May for Dee Dee Nesbitt's appointment.  Pt to follow with  hepatology for recommendation to proceed with a kidney transplant.  Continue to follow with neurology for mangement of your Keppra dosing. Pt to have live donors register now.   Patient will not be listed (GFR is > 20 at this time ), patient will receive:    - An Evaluation Summary Letter indicating what is needed to complete evaluation-discussed with patient if they would like to have testing done with [facility] or locally  Confirmed with patient that on successful completion of outstanding components, patient is eligible for active status and they will receive a follow-up call.   Confirmed that patient has contact information for additional questions or concerns.

## 2025-04-28 NOTE — LETTER
04/28/25        Matthew Arias  [address]  [place] [address]        Dear Matthew,    It was a pleasure to see you recently for consideration of kidney transplantation. Your pre-transplant evaluation results were reviewed at our Multidisciplinary Selection Committee on 04/02/2025. The Committee is requesting the following items are completed for your kidney transplant evaluation.    You do not have a GFR of 20 or less at this time. Please do let me know as soon as your GFR is 20 or less. I will add you onto the kidney transplant waitlist when your GFR is 20 or less.   Exercise stress test to assess your heart function.    Allergist provider appointment to assess your eosinophilia and to provide a recommendation to proceed with a kidney transplant.   Dermatology provider appointment for routine skin screening due to your chronic immunosuppression.   Bilateral kidney ultrasound to further assess your cysts seen on a previous CT scan.   Infectious Disease provider appointment to assess your vaccination status  for proceeding with a kidney transplant due to your previous splenectomy.   Repeat cardiolipin IgG and IgM at your next lab appointment near May 28, 2025.   Liver transplant team needs to provide a recommendation for you to proceed with a kidney transplant. Please continue to follow with your providers at  for this.   Continue to follow with your neurologist for Keppra management.   Continue to follow with your neurosurgery team for surveillance of your aneurysm.   Dental work needs to remain up to date. Please continue to work with your dentist for this.   Routine vaccinations need to remain up to date for: COVID 19, hepatitis B, pneumovax. Please work with your primary care provider for this.     A  from our Office will call you soon to make appointments for items # 2, 3, 4, 5 and 6.   Please do let me know when all of the above items are completed.  I will then have your results reviewed  at the Multidisciplinary Selection Committee for determination of next steps and approval.  When approved, you will be eligible to be changed to ACTIVE status on the kidney transplant waitlist as well as to proceed with a live donor kidney transplant in the event you have an approved live donor.     Please have any potential live kidney donors register online at [facility] to initiate their evaluations through our program's living donor screening tool at [email or web address]. Please note that potential donors will get an e-mail response once they submit their form within 1-2 business days. Potential donors may call our Main Office Number at [phone] and ask to speak with a donor coordinator if they have questions about the process. You will be notified by your transplant coordinator if a live donor has been approved for donation.        For any questions, please contact myself at the Transplant Office Main Number at [phone] or at my Direct Number at [phone]. You can also send me My Chart messages. .      Sincerely,  Do Marlow RN, BSN  Pre Kidney Pancreas Transplant Coordinator   [facility]  Solid Organ Transplant Care   [address] [place] [address] 310  [address] [address]  [email or web address]   Office Number: [phone]  Direct Number: [phone]   Fax Number: [phone]  Employed by [employer]     CC's: Dr. Rip Lora, Dr. Leighann Carmona, Dr. Grover Wolf, Dee Dee BAILEY.

## 2025-04-29 ENCOUNTER — PATIENT OUTREACH (OUTPATIENT)
Dept: CARE COORDINATION | Facility: CLINIC | Age: 40
End: 2025-04-29
Payer: COMMERCIAL

## 2025-04-29 NOTE — CONFIDENTIAL NOTE
DIAGNOSIS:    Campylobacter gastroenteritis   Organ transplant candidate   Pre-transplant evaluation for kidney transplant   Stage 4 chronic kidney disease (H)   Renal cyst   Eosinophilic esophagitis   Acquired absence of spleen   History of liver transplant (H)     Chronic kidney disease (CKD)   Pre-operative cardiovascular examination      DATE RECEIVED:  05.29.2025     NOTES (Gather within 2 years) STATUS DETAILS   OFFICE NOTE from referring provider   Internal 04.28.2025 Mily Boudreaux, NP    LABS (any labs) Internal / CE    MEDICATION LIST Internal

## 2025-04-29 NOTE — TELEPHONE ENCOUNTER
Writer has spoken with Mathtew regarding planned procedure with IR via telephone.      Matthew acknowledges understanding of pre-procedure instructions.         I have provided Matthew with IR number  for questions or concerns.      Barbie HAYES RN, BSN  Interventional Radiology

## 2025-04-30 ENCOUNTER — APPOINTMENT (OUTPATIENT)
Dept: MEDSURG UNIT | Facility: CLINIC | Age: 40
End: 2025-04-30
Attending: RADIOLOGY
Payer: COMMERCIAL

## 2025-04-30 ENCOUNTER — APPOINTMENT (OUTPATIENT)
Dept: INTERVENTIONAL RADIOLOGY/VASCULAR | Facility: CLINIC | Age: 40
End: 2025-04-30
Attending: INTERNAL MEDICINE
Payer: COMMERCIAL

## 2025-04-30 ENCOUNTER — HOSPITAL ENCOUNTER (OUTPATIENT)
Facility: CLINIC | Age: 40
Discharge: HOME | End: 2025-04-30
Attending: RADIOLOGY | Admitting: RADIOLOGY
Payer: COMMERCIAL

## 2025-04-30 VITALS
BODY MASS INDEX: 16.94 KG/M2 | HEIGHT: 71 IN | SYSTOLIC BLOOD PRESSURE: 150 MMHG | OXYGEN SATURATION: 99 % | HEART RATE: 96 BPM | TEMPERATURE: 98.1 F | RESPIRATION RATE: 16 BRPM | DIASTOLIC BLOOD PRESSURE: 84 MMHG | WEIGHT: 121.03 LBS

## 2025-04-30 DIAGNOSIS — T86.40 COMPLICATION OF TRANSPLANTED LIVER (H): ICD-10-CM

## 2025-04-30 LAB
ERYTHROCYTE [DISTWIDTH] IN BLOOD BY AUTOMATED COUNT: 14 % (ref 10–15)
HCT VFR BLD AUTO: 39.9 % (ref 40–53)
HGB BLD-MCNC: 12.6 G/DL (ref 13.3–17.7)
INR PPP: 1 (ref 0.85–1.15)
MCH RBC QN AUTO: 28.1 PG (ref 26.5–33)
MCHC RBC AUTO-ENTMCNC: 31.6 G/DL (ref 31.5–36.5)
MCV RBC AUTO: 89 FL (ref 78–100)
PLATELET # BLD AUTO: 422 10E3/UL (ref 150–450)
PROTHROMBIN TIME: 13.5 SECONDS (ref 11.8–14.8)
RBC # BLD AUTO: 4.48 10E6/UL (ref 4.4–5.9)
WBC # BLD AUTO: 9.3 10E3/UL (ref 4–11)

## 2025-04-30 PROCEDURE — 76942 ECHO GUIDE FOR BIOPSY: CPT | Mod: 26 | Performed by: PHYSICIAN ASSISTANT

## 2025-04-30 PROCEDURE — 36415 COLL VENOUS BLD VENIPUNCTURE: CPT | Performed by: PHYSICIAN ASSISTANT

## 2025-04-30 PROCEDURE — 88313 SPECIAL STAINS GROUP 2: CPT | Mod: TC | Performed by: PHYSICIAN ASSISTANT

## 2025-04-30 PROCEDURE — 99152 MOD SED SAME PHYS/QHP 5/>YRS: CPT | Performed by: PHYSICIAN ASSISTANT

## 2025-04-30 PROCEDURE — 47000 NEEDLE BIOPSY OF LIVER PERQ: CPT

## 2025-04-30 PROCEDURE — 85610 PROTHROMBIN TIME: CPT | Performed by: PHYSICIAN ASSISTANT

## 2025-04-30 PROCEDURE — 250N000009 HC RX 250: Performed by: PHYSICIAN ASSISTANT

## 2025-04-30 PROCEDURE — 999N000142 HC STATISTIC PROCEDURE PREP ONLY

## 2025-04-30 PROCEDURE — 85018 HEMOGLOBIN: CPT | Performed by: PHYSICIAN ASSISTANT

## 2025-04-30 PROCEDURE — 999N000133 HC STATISTIC PP CARE STAGE 2

## 2025-04-30 PROCEDURE — 250N000011 HC RX IP 250 OP 636: Performed by: PHYSICIAN ASSISTANT

## 2025-04-30 PROCEDURE — 47000 NEEDLE BIOPSY OF LIVER PERQ: CPT | Performed by: PHYSICIAN ASSISTANT

## 2025-04-30 RX ORDER — NALOXONE HYDROCHLORIDE 0.4 MG/ML
0.4 INJECTION, SOLUTION INTRAMUSCULAR; INTRAVENOUS; SUBCUTANEOUS
Status: DISCONTINUED | OUTPATIENT
Start: 2025-04-30 | End: 2025-04-30 | Stop reason: HOSPADM

## 2025-04-30 RX ORDER — FLUMAZENIL 0.1 MG/ML
0.2 INJECTION, SOLUTION INTRAVENOUS
Status: DISCONTINUED | OUTPATIENT
Start: 2025-04-30 | End: 2025-04-30 | Stop reason: HOSPADM

## 2025-04-30 RX ORDER — GENTAMICIN SULFATE 100 MG/100ML
1.7 INJECTION, SOLUTION INTRAVENOUS
Status: COMPLETED | OUTPATIENT
Start: 2025-04-30 | End: 2025-04-30

## 2025-04-30 RX ORDER — NALOXONE HYDROCHLORIDE 0.4 MG/ML
0.2 INJECTION, SOLUTION INTRAMUSCULAR; INTRAVENOUS; SUBCUTANEOUS
Status: DISCONTINUED | OUTPATIENT
Start: 2025-04-30 | End: 2025-04-30 | Stop reason: HOSPADM

## 2025-04-30 RX ORDER — LIDOCAINE 40 MG/G
CREAM TOPICAL
Status: DISCONTINUED | OUTPATIENT
Start: 2025-04-30 | End: 2025-04-30 | Stop reason: HOSPADM

## 2025-04-30 RX ORDER — CLINDAMYCIN PHOSPHATE 900 MG/50ML
900 INJECTION, SOLUTION INTRAVENOUS
Status: COMPLETED | OUTPATIENT
Start: 2025-04-30 | End: 2025-04-30

## 2025-04-30 RX ORDER — FENTANYL CITRATE 50 UG/ML
25-50 INJECTION, SOLUTION INTRAMUSCULAR; INTRAVENOUS EVERY 5 MIN PRN
Status: DISCONTINUED | OUTPATIENT
Start: 2025-04-30 | End: 2025-04-30 | Stop reason: HOSPADM

## 2025-04-30 RX ADMIN — MIDAZOLAM 1 MG: 1 INJECTION INTRAMUSCULAR; INTRAVENOUS at 14:56

## 2025-04-30 RX ADMIN — FENTANYL CITRATE 25 MCG: 50 INJECTION, SOLUTION INTRAMUSCULAR; INTRAVENOUS at 15:03

## 2025-04-30 RX ADMIN — MIDAZOLAM 0.5 MG: 1 INJECTION INTRAMUSCULAR; INTRAVENOUS at 15:03

## 2025-04-30 RX ADMIN — LIDOCAINE HYDROCHLORIDE 7 ML: 10 INJECTION, SOLUTION EPIDURAL; INFILTRATION; INTRACAUDAL; PERINEURAL at 15:04

## 2025-04-30 RX ADMIN — CLINDAMYCIN IN 5 PERCENT DEXTROSE 900 MG: 18 INJECTION, SOLUTION INTRAVENOUS at 13:41

## 2025-04-30 RX ADMIN — FENTANYL CITRATE 50 MCG: 50 INJECTION, SOLUTION INTRAMUSCULAR; INTRAVENOUS at 14:56

## 2025-04-30 RX ADMIN — GENTAMICIN SULFATE 100 MG: 100 INJECTION, SOLUTION INTRAVENOUS at 14:48

## 2025-04-30 ASSESSMENT — ACTIVITIES OF DAILY LIVING (ADL)
ADLS_ACUITY_SCORE: 41
ADLS_ACUITY_SCORE: 43

## 2025-04-30 NOTE — DISCHARGE INSTRUCTIONS
[facility]    Interventional Radiology  Patient Instructions Following Biopsy of Transplanted Liver    AFTER YOU GO HOME  If you were given sedation, for the first 24 hours: we recommend that an adult stay with you, DO NOT drive or operate machinery at home or at work, DO NOT smoke, DO NOT make any important or legal decisions.  DO NOT drink alcoholic beverages the day of your procedure  Drink plenty of fluids   Resume your regular diet, unless otherwise instructed by your Primary Physician  Relax and take it easy for 48 hours  DO NOT do any strenuous exercise or lifting (> 10 lbs) for at least 3 days following your procedure  Keep the dressing dry and in place for 24 hours. Replace with Band aid for 2 days.  Never leave a wet dressing in place.  Do not take a shower for at least 12 hours following your procedure. No tub bath, hot tub, or swimming for 5 days.  There should be minimum drainage from the biopsy site    CALL THE PHYSICIAN IF:  You start bleeding from the procedure site.  If you do start to bleed from that site, lie down flat and hold pressure on the site for a minimum of 10 minutes.  Your physician will tell you if you need to return to the hospital  You develop nausea or vomiting  You have excessive swelling, redness, or tenderness at the site  You have drainage that looks like it is infected.  You experience severe pain  You develop hives or a rash or unexplained itching  You develop shortness of breath  You develop a temperature of 101 degrees F or greater        [facility] INTERVENTIONAL RADIOLOGY DEPARTMENT  Procedure Physician: Joy Rai PA-C                                  Date of procedure: April 30, 2025  Telephone Numbers: [phone]      Monday-Friday 7:30 am to 4:00 pm  [phone] After 4:00 pm Monday-Friday, Weekends & Holidays. Option #4 for the , and then ask for the Interventional Radiologist on call.  Someone is on call 24 hrs/day  [facility] toll free  number: [phone] Monday-Friday 8:00 am to 4:30 pm  [facility] Emergency Dept: [phone]

## 2025-04-30 NOTE — PROCEDURES
[facility]    Procedure: IR Procedure Note    Date/Time: 4/30/2025 3:13 PM    Performed by: Belen Rai PA-C  Authorized by: Belen Rai PA-C  IR Fellow Physician:    Pre Procedure Diagnosis: S/p Liver transplant  Post Procedure Diagnosis: Same    UNIVERSAL PROTOCOL   Site Marked: Yes  Prior Images Obtained and Reviewed:  Yes  Required items: Required blood products, implants, devices and special equipment available    Patient identity confirmed:  Arm band, provided demographic data, hospital-assigned identification number and verbally with patient  Patient was reevaluated immediately before administering moderate or deep sedation or anesthesia  Confirmation Checklist:  Correct equipment/implants were available, procedure was appropriate and matched the consent or emergent situation, relevant allergies and patient's identity using two indicators  Time out: Immediately prior to the procedure a time out was called    Universal Protocol: the Joint Commission Universal Protocol was followed    Preparation: Patient was prepped and draped in usual sterile fashion       ANESTHESIA    Anesthesia:  Local infiltration  Local Anesthetic:  Lidocaine 1% without epinephrine  Anesthetic Total (mL):  7      SEDATION  Patient Sedated: Yes    Sedation:  Fentanyl and midazolam  Vital signs: Vital signs monitored during sedation    See dictated procedure note for full details.  Findings: Image guided liver transplant biopsy. Three passes, three cores.     Specimens: core needle biopsy specimens sent for pathological analysis    Procedural Complications: None    Condition: Stable    Plan: Patient to return to 2A for 2 hours of bedrest. Keep area clean and dry. No heavy lifting or straining. Three specimens sent to pathology.       PROCEDURE  Describe Procedure: Image guided liver transplant biopsy. Three passes, three core specimens obtained. Gelfoam administered into biopsy track.  Sterile dressing applied.   Patient Tolerance:  Patient tolerated the procedure well with no immediate complications  Length of time physician/provider present for 1:1 monitoring during sedation:  0-22 min

## 2025-04-30 NOTE — PROGRESS NOTES
Tolerated bedrest without issues.  Tolerated food, fluids, ambulation and urination.  RUQ biopsy site soft, and intact.  Reviewed discharge instructions with Matthew and his mother.  PIV removed.  Discharged to home with mother.

## 2025-04-30 NOTE — PROGRESS NOTES
Arrived from home for a transplanted liver biopsy.  VSS.  Denies pain.  H&P current.  Consent obtained.  Will be ready for procedure when labs are resulted.

## 2025-04-30 NOTE — IR NOTE
Patient Name: Matthew Arias  Medical Record Number: [id number]  Today's Date: 4/30/2025    Procedure: Liver transplant biopsy   Proceduralist: Belen Rai PA-C  Pathology present: No; random biopsy     Procedure Start: 1457  Procedure end: 1511  Sedation medications administered: Midazolam 1.5 mg, Fentanyl 75 mcg     Report given to: WALLACE Chung 2A  : N/A    Other Notes: Pt arrived to IR room 6 from . Consent reviewed. Pt denies any questions or concerns regarding procedure. Pt positioned supine and monitored per protocol.     3 cores obtained and sent to lab as ordered.    GelFoam used to close biopsy needle track.  Hemostasis achieved.    Patient to be on bedrest for 2 hours.    Pt tolerated procedure without any noted complications. Pt transferred back to .     Admission

## 2025-04-30 NOTE — PRE-PROCEDURE
GENERAL PRE-PROCEDURE:   Procedure:  Ir percutaneous liver transplant biopsy  Date/Time:  4/30/2025 1:22 PM    Verbal consent obtained?: Yes    Written consent obtained?: Yes    Risks and benefits: Risks, benefits and alternatives were discussed    Consent given by:  Patient  Patient states understanding of procedure being performed: Yes    Patient's understanding of procedure matches consent: Yes    Procedure consent matches procedure scheduled: Yes    Expected level of sedation:  Moderate  Appropriately NPO:  Yes  ASA Class:  2  Mallampati  :  Grade 2- soft palate, base of uvula, tonsillar pillars, and portion of posterior pharyngeal wall visible  Lungs:  Lungs clear with good breath sounds bilaterally  Heart:  Normal heart sounds and rate  History & Physical reviewed:  History and physical reviewed and updates made (see comment)  H&P Comments:  Grade II/III Mallampati score  Statement of review:  I have reviewed the lab findings, diagnostic data, medications, and the plan for sedation

## 2025-04-30 NOTE — PROGRESS NOTES
Returned from IR s/p transplanted kidney biopsy.  VSS.  Denies pain.  RUQ biopsy site soft and intact.  Groggy.  Dozing comfortably in bed.

## 2025-05-01 ENCOUNTER — PATIENT OUTREACH (OUTPATIENT)
Dept: CARE COORDINATION | Facility: CLINIC | Age: 40
End: 2025-05-01
Payer: COMMERCIAL

## 2025-05-01 LAB
PATH REPORT.COMMENTS IMP SPEC: NORMAL
PATH REPORT.FINAL DX SPEC: NORMAL
PATH REPORT.GROSS SPEC: NORMAL
PATH REPORT.MICROSCOPIC SPEC OTHER STN: NORMAL
PATH REPORT.RELEVANT HX SPEC: NORMAL
PHOTO IMAGE: NORMAL

## 2025-05-01 NOTE — TELEPHONE ENCOUNTER
FUTURE VISIT INFORMATION      FUTURE VISIT INFORMATION:  Date: 5/1/25  Time: 11:30a  [place] [facility] INFORMATION:  Referring provider:  Mily Boudreaux NP   Referring providers clinic:  [facility] [place]  Reason for visit/diagnosis: A04.5 (ICD-10-CM) - Campylobacter gastroenteritis, Z76.82 (ICD-10-CM) - Organ transplant candidate, Z01.818 (ICD-10-CM) - Pre-transplant evaluation for kidney transplant, N18.4 (ICD-10-CM) - Stage 4 chronic kidney disease (H), N28.1 (ICD-10-CM) - Renal cyst, K20.0 (ICD-10-CM) - Eosinophilic esophagitis      RECORDS REQUESTED FROM:       Clinic name Comments Records Status Imaging Status

## 2025-05-13 ENCOUNTER — VIRTUAL VISIT (OUTPATIENT)
Dept: ALLERGY | Facility: CLINIC | Age: 40
End: 2025-05-13
Attending: NURSE PRACTITIONER
Payer: COMMERCIAL

## 2025-05-13 DIAGNOSIS — H10.10 SEASONAL AND PERENNIAL ALLERGIC RHINOCONJUNCTIVITIS: ICD-10-CM

## 2025-05-13 DIAGNOSIS — J30.2 SEASONAL AND PERENNIAL ALLERGIC RHINOCONJUNCTIVITIS: ICD-10-CM

## 2025-05-13 DIAGNOSIS — N18.4 STAGE 4 CHRONIC KIDNEY DISEASE (H): ICD-10-CM

## 2025-05-13 DIAGNOSIS — Z01.818 PRE-TRANSPLANT EVALUATION FOR KIDNEY TRANSPLANT: ICD-10-CM

## 2025-05-13 DIAGNOSIS — L50.9 URTICARIA: ICD-10-CM

## 2025-05-13 DIAGNOSIS — Z76.82 ORGAN TRANSPLANT CANDIDATE: ICD-10-CM

## 2025-05-13 DIAGNOSIS — E55.9 HYPOVITAMINOSIS D: ICD-10-CM

## 2025-05-13 DIAGNOSIS — Z88.9 MULTIPLE DRUG ALLERGIES: Primary | ICD-10-CM

## 2025-05-13 DIAGNOSIS — N25.81 SECONDARY HYPERPARATHYROIDISM: ICD-10-CM

## 2025-05-13 DIAGNOSIS — J30.81 CAT ALLERGIES: ICD-10-CM

## 2025-05-13 DIAGNOSIS — K20.0 EOSINOPHILIC ESOPHAGITIS: ICD-10-CM

## 2025-05-13 DIAGNOSIS — J30.89 SEASONAL AND PERENNIAL ALLERGIC RHINOCONJUNCTIVITIS: ICD-10-CM

## 2025-05-13 DIAGNOSIS — N18.4 CKD (CHRONIC KIDNEY DISEASE) STAGE 4, GFR 15-29 ML/MIN (H): Primary | ICD-10-CM

## 2025-05-13 NOTE — PROGRESS NOTES
[facility] Dermato-allergology Note  Virtual visit: store and forward video (ReadyDock[employer] connected), start time: 10:20 am, end time: 11:06 am, date of images: none  Encounter Date: May 13, 2025    Virtual Visit Details  Type of service:  Video Visit   Originating Location (pt. Location): Home    Distant Location (provider location):  On-site  Platform used for Video Visit: Cindy  ____________________________________________    CC: Allergy Consult (Runny nose primarily during the summer. Upset stomach for most antibiotics. )      HPI:  (May 13, 2025)  Mr. Matthew Arias is a(n) 39 year old male who presents today as new patient for allergy consultation  - Referred by Mily Boudreaux NP on 4/28/2025 for eosinophilia, patient is a kidney transplant candidate.   - 3/26/2025 OV visit with Dr. Luciano Thorne (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 He underwent a retransplant in 2001 because of cirrhosis.    Matthew has had good allograft function. His liver enzymes have been increased over this past year. He has been maintained on CellCept and prednisone. He had been taken off tacrolimus 20 years ago because of CKD. He has chronic kidney disease with a GFR around 30. This got temporarily worse when he had Campylobacter in November. It is back to his baseline. He did talk to the [facility] kidney transplant program for evaluation and is waiting to hear back with the results of that evaluation, which was just done last week.    Matthew also has systemic eosinophilia. He has eosinophilic esophagitis as well. Follows up with gastroenterology locally in [place]. He is on a PPI and sucralfate. I do not see that he is on budesonide. Denies any chest pain. No dysphagia. No shortness of breath. No GI or genitourinary complaints now. Past history of brain aneurysm status post  clipping, most recent imaging without recurrence. Neurosurgery saw him recently and discharged him saying he does not need any further imaging from their perspective.     FROM A&P:  1. Mr. Matthew Arias is status post liver transplant in 1986 and 2001 for biliary atresia and cirrhosis. He has preserved liver function but increased liver enzymes. We did do a FibroScan today that showed a liver elastography score of 11.4 kPa, although there was some variation due to posttransplant status and scarring. This is consistent with possible stage III fibrosis or inflammation and lesser fibrosis. CAP score was normal at 163, consistent with absence of steatosis. Given the elevated liver enzymes and this FibroScan result concerning for significant fibrosis, I do recommend an ultrasound-guided liver biopsy. I would like him to get this at the [facility] for his convenience, and the slides can be sent to us for over-read and review.  2. He should follow up with his local gastroenterologist for eosinophilic esophagitis. I will defer to him if any change in therapy is needed such as Dupixent or budesonide.  3. Given his systemic eosinophilia and inability to gain weight, I recommend that he see an allergist and immunologist locally. He can be referred to the [facility] for this, and I would appreciate it if Dr. Lora would send him to the clinic.  4. He should get a bone densitometry to screen for osteoporosis. Vitamin D level is low. I recommend he take vitamin D 2000 units daily and recheck labs in 3 months. Regarding health care maintenance, he tells me he is up to date with the flu shot and the COVID booster. He should get yearly dermatology, skin cancer screening exams, eye exam, and dental evaluations. I will plan to see him back in clinic in the next 6 months.   - 2017 got C Diff 4x   - Hx of eosinophilic esophagitis, following with GI  - Taking budesonide, feels better but not 100%  - Otherwise  feeling well in usual state of health    Physical Exam:  General: In no acute distress, well-developed, well-nourished  Eyes: no conjunctivitis  ENT: no signs of rhinitis   Pulmonary: no wheezing or coughing  Skin: Focused examination of the skin on test sites was performed = see test results below    Past Medical History:   Patient Active Problem List   Diagnosis    History of liver transplant (H)    Immunosuppression    Renal cyst    Hypocalcemia    Hyperparathyroidism due to renal insufficiency    Acidosis    Anemia    Acquired absence of spleen    Adjustment disorder with mixed emotional features    Emanuel's esophagus with high grade dysplasia    Benign neoplasm of stomach    Cellulitis of lower extremity    Brain aneurysm    Complication of transplanted liver (H)    Congenital biliary atresia (H)    Diaphragmatic hernia    Diarrhea    Disease due to severe acute respiratory syndrome coronavirus 2 (SARS-CoV-2)    Dysphagia    Enterocolitis    Colitis due to Clostridium difficile    Eosinophilic esophagitis    Gastritis, erosive    Gastroesophageal reflux disease with esophagitis    Gastroesophageal reflux disease without esophagitis    Generalized anxiety disorder    History of severe acute respiratory syndrome coronavirus 2 (SARS-CoV-2) disease    Hyperammonemia    Hyperlipidemia    Hypertension    Immunocompromised due to corticosteroids    Immunodeficiency    Intracranial subarachnoid hemorrhage (H)    Leukocytosis    Long term (current) use of systemic steroids    Recurrent major depressive disorder, in remission    Moderate protein-calorie malnutrition    Multiple acquired cysts of kidney    Osteopenia    Polyp of duodenum    Proteinuria    Reactive thrombocytosis    Relapsing fever    Seizure (H)    CKD (chronic kidney disease) stage 4, GFR 15-29 ml/min (H)    Tick-borne relapsing fever    Vomiting    Emanuel esophagus    Cirrhosis of liver (H)    Campylobacter gastroenteritis    Epileptic grand mal  status (H)    Underweight     Past Medical History:   Diagnosis Date    Anaplasmosis     Emanuel esophagus     Biliary atresia (H)     Brain aneurysm 02/2013    s/p clipping    Campylobacter gastroenteritis 11/2024    Cirrhosis of liver (H)     CKD (chronic kidney disease) stage 4, GFR 15-29 ml/min (H)     Colitis due to Clostridium difficile     Eosinophilic esophagitis     Epileptic grand mal status (H) 2019    Osteopenia     Renal cyst bilateral     Underweight        Allergies:  Allergies   Allergen Reactions    Levofloxacin      Other Reaction(s): Renal Failure    Hx of renal failure    Amoxicillin-Pot Clavulanate Rash     Patient reported this was a couple years ago and he did not need treatment for the rash. Just stopped taking the medication.    Bee Pollen Itching     Allergic Rhinitis, Itchy watery eyes    Pollen Extract Itching     Other Reaction(s): RUNNY NOSE    Seasonal Allergies: Allergic Rhinitis, Itchy watery eyes    Seasonal Allergies Itching     Allergic Rhinitis, Itchy watery eyes    Cefprozil GI Disturbance     Other Reaction(s): GI Upset    Clavulanic Acid Unknown     Other Reaction(s): Other (see comments)    Erythromycin      Other Reaction(s): GI UPSET, GI Upset    Gramineae Pollens Other (See Comments)    Sulfa Antibiotics      Other Reaction(s): *Unknown - Childhood Rxn    Macrolides And Ketolides Rash     Other Reaction(s): GI UPSET, UNKNOWN    Penicillins Rash       Medications:  Current Outpatient Medications   Medication Sig Dispense Refill    acetaminophen (TYLENOL) 500 MG tablet Take 500 mg by mouth (Patient not taking: Reported on 3/24/2025)      buPROPion (WELLBUTRIN SR) 100 MG 12 hr tablet Take 150 mg by mouth      empagliflozin (JARDIANCE) 10 MG TABS tablet Take 1 tablet (10 mg) by mouth daily. 90 tablet 3    FLUoxetine (PROZAC) 20 MG capsule TAKE 1 CAPSULE(20 MG) BY MOUTH EVERY MORNING      levETIRAcetam (KEPPRA) 500 MG tablet Take 500 mg by mouth every 12 hours       mycophenolate (GENERIC EQUIVALENT) 250 MG capsule Take 2 capsules by mouth every 12 hours      predniSONE (DELTASONE) 5 MG tablet Take 10 mg by mouth daily      sodium bicarbonate 650 MG tablet Take 1 tablet (650 mg) by mouth 2 times daily. 180 tablet 3    Vitamin D3 (CHOLECALCIFEROL) 25 mcg (1000 units) tablet Take 1 tablet (25 mcg) by mouth daily. 90 tablet 3     No current facility-administered medications for this visit.       Previous Labs, Allergy Tests, Dermatopathology, Imaging:  N/a    Referred By: Mily Boudreaux NP  [address] [place] [address]     Allergy Tests:  Past Allergy Test    Family History:  Family History   Problem Relation Age of Onset    No Known Problems Mother     No Known Problems Father     No Known Problems Sister     Kidney Disease Paternal Grandfather        Social History:  The patient n/a.   Patient has the following hobbies or non-occupational exposure: n/a.    Order for Future Allergy Testing:    [x] Outpatient  [] Inpatient: [facility]..../ Bed ...    Skin Atopy (atopic dermatitis)? [] Yes     [] No  Comments:   Childhood eczema?   [] Yes     [] No  Comments:   Hand eczema?   [] Yes     [] No  If yes, leading hand? [] Right     [] Left     [] Ambidextrous  Comments:     Contact allergies?     Comments:   Including adhesives/bandages? [] Yes     [x] No  Comments:   Including metals?   [] Yes     [x] No  Comments:   Other substances?   [] Yes     [x] No  Comments:     Drug allergies?   [x] Yes     [] No  Comments:   - Levofloxacin: doesn't remember this one, might be hard on your kidneys  - Augmentin: developed a rash a couple years ago, inside of one leg, lasted for 3-4 days. No tongue swelling, difficulty breathing. Just switched the antibiotic, did not get any other treatments for it.  - Cefprozil: GI upset; no rashes/swelling; last took > 10 years ago  - Erythromycin: ?maybe a rash; 8-10 years ago  - Sulfa antibiotics: had pink eye, made his eyes worse. As a kid.   -  took it orally for strep and did fine 2 years ago  - Macrolides: Unsure what this reaction was  - Penicillin: Unclear what the reaction was     Angioedema?   [] Yes     [x] No  Comments:     Urticaria?   [x] Yes     [] No  Comments:     Food allergies?   [x] Yes ?    [] No  Comments:   - is supposed to be receiving some testing for food allergies?  - has EoE    Pet allergies?   [x] Yes     [] No  Comments:   - some borderline reaction to cat      Environmental allergy symptoms?  [x] Conjunctivitis  [] Otitis  [] Pharyngitis  [] Polyposis  [] Postnasal Drip  [x] Rhinitis  [] Sinusitis  [] None  Comments:   - Bee pollen: seasonal allergies, takes antihistamines once in a while; does not remember allergy testing, maybe when he was a kid  - when grass is cut notices symptoms the most     HENT Operations?  [] Adenoids [] Septum [] Sinus  [] Tonsils        [] Other:   [] None  Comments:     Pulmonary symptoms (from birth to present)?  [] Asthma bronchiale  Inhaler(s)?:   [] Coughing  [] Other  [x] None  Comments:     Environmental and pulmonary symptoms aggravated by?  Season: [] None     [] I     [] II     [] III     [] IV     [x] V     [x] VI          [] VII     [] VIII     [] IX     [] X     [] XI     [] XII     [] Perennial  Time of Day: [] None     [] Morning     [] Noon     [] Evening     [] Night     [] Whole Day  Location/Changes: [] None     [] Inside     [] Outside     [] Mountain     [] Sea     [] Other:   Triggers (specific): [] None     [] Animals     [] Dust     [] Mold     [] Plants     [] Other:   Triggers (other): [] None     [] Psyche     [] Sport     [] Work     [] Other:   Irritant: [] None     [] Cold     [] Heat     [] Odors     [] Physical Efforts     [] Smoke     [] Other:     Order for PATCH TESTS  Reason for tests (suspected allergy): n/a  Known previous allergies: n/a  Standardized panels  [] Standard panel (40 tests)  [] Preservatives & Antimicrobials (31 tests)  [] Emulsifiers & Additives (25  tests)   [] Perfumes/Flavours & Plants (25 tests)  [] Hairdresser panel (12 tests)  [] Rubber Chemicals (22 tests)  [] Plastics (26 tests)  [] Colorants/Dyes/Food additives (20 tests)  [] Metals (implants/dental) (24 tests)  [] Local anaesthetics/NSAIDs (13 tests)  [] Antibiotics & Antimycotics (14 tests)   [] Corticosteroids (15 tests)   [] Photopatch test (62 tests)   [] Others:     [] Patient's Own Products:   DO NOT test if chemical or biological identity is unknown!   always ask from patient the product information and safety sheets (MSDS)       Order for PRICK TESTS    Reason for tests (suspected allergy): atopy?  Known previous allergies: prior     Standardized prick panels  [x] Atopic panel (20 tests)  [] Pediatric Panel (12 tests)  [] Milk, Meat, Eggs, Grains (20 tests)   [] Dust, Epithelia, Feathers (10 tests)  [] Fish, Seafood, Shellfish (17 tests)  [] Nuts, Beans (14 tests)  [] Spice, Vegetable, Fruit (17 tests)  [] Pollen Panel = Tree, Grass, Weed (24 tests)  [] Others:     [] Patient's Own Products:   DO NOT test if chemical or biological identity is unknown!   always ask from patient the product information and safety sheets (MSDS)     Standardized intradermal tests  [] Alternaria alternata  [] Aspergillus fumigatus  [] Cladosporium herbarum   [] Penicillium notatum  [] Dermatophagoides farinae  [] Dermatophagoides pteronyssinus  [] Dog Epithelium  [] Cat Epithelium  [] Others:     [] Bee venom   [] Wasp venom  !!Specific protocol with dilutions!!       Order for Drug allergy tests (prick & intradermal & patch tests)    [x] Penicillin G     [x] Ampicillin   [] Cefazolin (1st gen)     [] Cefuroxime (2nd gen)     [] Ceftriaxone (3rd gen)     [] Ceftazidime (3rd gen)     [] Cefepime (4th gen)       [] Bactrim  [] Iodixanol (Visipaque)     [] Iopamidol (Isovue)       [] Iohexol (Omnipaque)  [x] Others: Augmentin, Erythromycin  [] Patient's Own:   Order for ... as test  date  ________________________________    Assessment & Plan:    ==> Final Diagnosis:     # Ruling out multiple drug reactions (pre-transplant)  Penicillin and amoxicillin unlikely allergic reaction (localized rash)  Sulfa antibiotics, unknown reaction in childhood  Erythromycin, unclear reaction   * chronic illness with exacerbation, progression, side effects from treatment    # Suspicion for atopic predisposition with:   Seasonal RC and contact urticaria in summer and with grass  Proven eosinophilic esophagitis   Borderline RC to cat   * chronic illness with exacerbation, progression, side effects from treatment      These conclusions are made at the best of one's knowledge and belief based on the provided evidence such as patient's history and allergy test results and they can change over time or can be incomplete because of missing information.    ==> Treatment Plan:    >> Will plan for drug allergy tests and atopy screen.   - Advised patient to hold antihistamines for at least one week prior to allergy tests.     Procedures Performed: none    Staff Involved: Provider, Staff, Resident, and Scribe    Scribe Disclosure:   I, Humaira Sidhu, am serving as a scribe to document services personally performed by Brad Berry MD based on data collection and the provider's statements to me.     Staff Physician Comments:  I was present with the scribe who participated in the documentation of the note. I have verified the history and personally performed the physical exam and medical decision making. I agree with the assessment and plan as documented in the note. I have reviewed and if necessary amended the note.      Brad Berry MD  Professor  Head of Dermato-Allergy Division  Department of Dermatology  [facility]      Follow-up in Derm-Allergy clinic for allergy tests as planned   ____________________________________________    I spent a total of 46 minutes with Matthew ALEXIS  Juana. This time was spent counseling the patient and/or coordinating care, explaining the allergy tests

## 2025-05-13 NOTE — PATIENT INSTRUCTIONS
You can always access your most recent office visit note with this allergy clinic via [facility] MyChart, which contains all of your results from testing performed by Dr. Berry along with the details of the discussed treatment plan.  If you do not have access to [facility] [employer] [facility], please discuss with a [facility] staff member. Additionally, if your provider is within [facility] or their EMR participates in the [employer] network, they can also access this information.     ____________________________________________     - Advised patient to hold antihistamines for at least one week prior to allergy tests.

## 2025-05-13 NOTE — LETTER
5/13/2025      Matthew Arias  [address]  [place] [address]      Dear Colleague,    Thank you for referring your patient, Matthew Arias, to the [facility] [place]. Please see a copy of my visit note below.    [facility] Dermato-allergology Note  Virtual visit: store and forward video (Shoeboxt connected), start time: 10:20 am, end time: 11:06 am, date of images: none  Encounter Date: May 13, 2025    Virtual Visit Details  Type of service:  Video Visit   Originating Location (pt. Location): Home    Distant Location (provider location):  On-site  Platform used for Video Visit: MEPS Real-Time  ____________________________________________    CC: Allergy Consult (Runny nose primarily during the summer. Upset stomach for most antibiotics. )      HPI:  (May 13, 2025)  Mr. Matthew Arias is a(n) 39 year old male who presents today as new patient for allergy consultation  - Referred by Mily Boudreaux NP on 4/28/2025 for eosinophilia, patient is a kidney transplant candidate.   - 3/26/2025 OV visit with Dr. 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 He underwent a retransplant in 2001 because of cirrhosis.    Matthew has had good allograft function. His liver enzymes have been increased over this past year. He has been maintained on CellCept and prednisone. He had been taken off tacrolimus 20 years ago because of CKD. He has chronic kidney disease with a GFR around 30. This got temporarily worse when he had Campylobacter in November. It is back to his baseline. He did talk to the [facility] kidney transplant program for evaluation and is waiting to hear back with the results of that evaluation, which was just done last week.    Matthew also has systemic eosinophilia. He has eosinophilic esophagitis as well. Follows up with  gastroenterology locally in [place]. He is on a PPI and sucralfate. I do not see that he is on budesonide. Denies any chest pain. No dysphagia. No shortness of breath. No GI or genitourinary complaints now. Past history of brain aneurysm status post clipping, most recent imaging without recurrence. Neurosurgery saw him recently and discharged him saying he does not need any further imaging from their perspective.     FROM A&P:  1. Mr. Matthew Arias is status post liver transplant in 1986 and 2001 for biliary atresia and cirrhosis. He has preserved liver function but increased liver enzymes. We did do a FibroScan today that showed a liver elastography score of 11.4 kPa, although there was some variation due to posttransplant status and scarring. This is consistent with possible stage III fibrosis or inflammation and lesser fibrosis. CAP score was normal at 163, consistent with absence of steatosis. Given the elevated liver enzymes and this FibroScan result concerning for significant fibrosis, I do recommend an ultrasound-guided liver biopsy. I would like him to get this at the [facility] for his convenience, and the slides can be sent to us for over-read and review.  2. He should follow up with his local gastroenterologist for eosinophilic esophagitis. I will defer to him if any change in therapy is needed such as Dupixent or budesonide.  3. Given his systemic eosinophilia and inability to gain weight, I recommend that he see an allergist and immunologist locally. He can be referred to the [facility] for this, and I would appreciate it if Dr. Lora would send him to the clinic.  4. He should get a bone densitometry to screen for osteoporosis. Vitamin D level is low. I recommend he take vitamin D 2000 units daily and recheck labs in 3 months. Regarding health care maintenance, he tells me he is up to date with the flu shot and the COVID booster. He should get yearly dermatology, skin  cancer screening exams, eye exam, and dental evaluations. I will plan to see him back in clinic in the next 6 months.   - 2017 got C Diff 4x   - Hx of eosinophilic esophagitis, following with GI  - Taking budesonide, feels better but not 100%  - Otherwise feeling well in usual state of health    Physical Exam:  General: In no acute distress, well-developed, well-nourished  Eyes: no conjunctivitis  ENT: no signs of rhinitis   Pulmonary: no wheezing or coughing  Skin: Focused examination of the skin on test sites was performed = see test results below    Past Medical History:   Patient Active Problem List   Diagnosis     History of liver transplant (H)     Immunosuppression     Renal cyst     Hypocalcemia     Hyperparathyroidism due to renal insufficiency     Acidosis     Anemia     Acquired absence of spleen     Adjustment disorder with mixed emotional features     Emanuel's esophagus with high grade dysplasia     Benign neoplasm of stomach     Cellulitis of lower extremity     Brain aneurysm     Complication of transplanted liver (H)     Congenital biliary atresia (H)     Diaphragmatic hernia     Diarrhea     Disease due to severe acute respiratory syndrome coronavirus 2 (SARS-CoV-2)     Dysphagia     Enterocolitis     Colitis due to Clostridium difficile     Eosinophilic esophagitis     Gastritis, erosive     Gastroesophageal reflux disease with esophagitis     Gastroesophageal reflux disease without esophagitis     Generalized anxiety disorder     History of severe acute respiratory syndrome coronavirus 2 (SARS-CoV-2) disease     Hyperammonemia     Hyperlipidemia     Hypertension     Immunocompromised due to corticosteroids     Immunodeficiency     Intracranial subarachnoid hemorrhage (H)     Leukocytosis     Long term (current) use of systemic steroids     Recurrent major depressive disorder, in remission     Moderate protein-calorie malnutrition     Multiple acquired cysts of kidney     Osteopenia     Polyp of  duodenum     Proteinuria     Reactive thrombocytosis     Relapsing fever     Seizure (H)     CKD (chronic kidney disease) stage 4, GFR 15-29 ml/min (H)     Tick-borne relapsing fever     Vomiting     Emanuel esophagus     Cirrhosis of liver (H)     Campylobacter gastroenteritis     Epileptic grand mal status (H)     Underweight     Past Medical History:   Diagnosis Date     Anaplasmosis      Emanuel esophagus      Biliary atresia (H)      Brain aneurysm 02/2013    s/p clipping     Campylobacter gastroenteritis 11/2024     Cirrhosis of liver (H)      CKD (chronic kidney disease) stage 4, GFR 15-29 ml/min (H)      Colitis due to Clostridium difficile      Eosinophilic esophagitis      Epileptic grand mal status (H) 2019     Osteopenia      Renal cyst bilateral      Underweight        Allergies:  Allergies   Allergen Reactions     Levofloxacin      Other Reaction(s): Renal Failure    Hx of renal failure     Amoxicillin-Pot Clavulanate Rash     Patient reported this was a couple years ago and he did not need treatment for the rash. Just stopped taking the medication.     Bee Pollen Itching     Allergic Rhinitis, Itchy watery eyes     Pollen Extract Itching     Other Reaction(s): RUNNY NOSE    Seasonal Allergies: Allergic Rhinitis, Itchy watery eyes     Seasonal Allergies Itching     Allergic Rhinitis, Itchy watery eyes     Cefprozil GI Disturbance     Other Reaction(s): GI Upset     Clavulanic Acid Unknown     Other Reaction(s): Other (see comments)     Erythromycin      Other Reaction(s): GI UPSET, GI Upset     Gramineae Pollens Other (See Comments)     Sulfa Antibiotics      Other Reaction(s): *Unknown - Childhood Rxn     Macrolides And Ketolides Rash     Other Reaction(s): GI UPSET, UNKNOWN     Penicillins Rash       Medications:  Current Outpatient Medications   Medication Sig Dispense Refill     acetaminophen (TYLENOL) 500 MG tablet Take 500 mg by mouth (Patient not taking: Reported on 3/24/2025)       buPROPion  (WELLBUTRIN SR) 100 MG 12 hr tablet Take 150 mg by mouth       empagliflozin (JARDIANCE) 10 MG TABS tablet Take 1 tablet (10 mg) by mouth daily. 90 tablet 3     FLUoxetine (PROZAC) 20 MG capsule TAKE 1 CAPSULE(20 MG) BY MOUTH EVERY MORNING       levETIRAcetam (KEPPRA) 500 MG tablet Take 500 mg by mouth every 12 hours       mycophenolate (GENERIC EQUIVALENT) 250 MG capsule Take 2 capsules by mouth every 12 hours       predniSONE (DELTASONE) 5 MG tablet Take 10 mg by mouth daily       sodium bicarbonate 650 MG tablet Take 1 tablet (650 mg) by mouth 2 times daily. 180 tablet 3     Vitamin D3 (CHOLECALCIFEROL) 25 mcg (1000 units) tablet Take 1 tablet (25 mcg) by mouth daily. 90 tablet 3     No current facility-administered medications for this visit.       Previous Labs, Allergy Tests, Dermatopathology, Imaging:  N/a    Referred By: Mily Boudreaux NP  [address] [place] [address]     Allergy Tests:  Past Allergy Test    Family History:  Family History   Problem Relation Age of Onset     No Known Problems Mother      No Known Problems Father      No Known Problems Sister      Kidney Disease Paternal Grandfather        Social History:  The patient n/a.   Patient has the following hobbies or non-occupational exposure: n/a.    Order for Future Allergy Testing:    [x] Outpatient  [] Inpatient: [facility]..../ Bed ...    Skin Atopy (atopic dermatitis)? [] Yes     [] No  Comments:   Childhood eczema?   [] Yes     [] No  Comments:   Hand eczema?   [] Yes     [] No  If yes, leading hand? [] Right     [] Left     [] Ambidextrous  Comments:     Contact allergies?     Comments:   Including adhesives/bandages? [] Yes     [x] No  Comments:   Including metals?   [] Yes     [x] No  Comments:   Other substances?   [] Yes     [x] No  Comments:     Drug allergies?   [x] Yes     [] No  Comments:   - Levofloxacin: doesn't remember this one, might be hard on your kidneys  - Augmentin: developed a rash a couple years ago, inside of  one leg, lasted for 3-4 days. No tongue swelling, difficulty breathing. Just switched the antibiotic, did not get any other treatments for it.  - Cefprozil: GI upset; no rashes/swelling; last took > 10 years ago  - Erythromycin: ?maybe a rash; 8-10 years ago  - Sulfa antibiotics: had pink eye, made his eyes worse. As a kid.   - took it orally for strep and did fine 2 years ago  - Macrolides: Unsure what this reaction was  - Penicillin: Unclear what the reaction was     Angioedema?   [] Yes     [x] No  Comments:     Urticaria?   [x] Yes     [] No  Comments:     Food allergies?   [x] Yes ?    [] No  Comments:   - is supposed to be receiving some testing for food allergies?  - has EoE    Pet allergies?   [x] Yes     [] No  Comments:   - some borderline reaction to cat      Environmental allergy symptoms?  [x] Conjunctivitis  [] Otitis  [] Pharyngitis  [] Polyposis  [] Postnasal Drip  [x] Rhinitis  [] Sinusitis  [] None  Comments:   - Bee pollen: seasonal allergies, takes antihistamines once in a while; does not remember allergy testing, maybe when he was a kid  - when grass is cut notices symptoms the most     HENT Operations?  [] Adenoids [] Septum [] Sinus  [] Tonsils        [] Other:   [] None  Comments:     Pulmonary symptoms (from birth to present)?  [] Asthma bronchiale  Inhaler(s)?:   [] Coughing  [] Other  [x] None  Comments:     Environmental and pulmonary symptoms aggravated by?  Season: [] None     [] I     [] II     [] III     [] IV     [x] V     [x] VI          [] VII     [] VIII     [] IX     [] X     [] XI     [] XII     [] Perennial  Time of Day: [] None     [] Morning     [] Noon     [] Evening     [] Night     [] Whole Day  Location/Changes: [] None     [] Inside     [] Outside     [] Mountain     [] Sea     [] Other:   Triggers (specific): [] None     [] Animals     [] Dust     [] Mold     [] Plants     [] Other:   Triggers (other): [] None     [] Psyche     [] Sport     [] Work     [] Other:    Irritant: [] None     [] Cold     [] Heat     [] Odors     [] Physical Efforts     [] Smoke     [] Other:     Order for PATCH TESTS  Reason for tests (suspected allergy): n/a  Known previous allergies: n/a  Standardized panels  [] Standard panel (40 tests)  [] Preservatives & Antimicrobials (31 tests)  [] Emulsifiers & Additives (25 tests)   [] Perfumes/Flavours & Plants (25 tests)  [] Hairdresser panel (12 tests)  [] Rubber Chemicals (22 tests)  [] Plastics (26 tests)  [] Colorants/Dyes/Food additives (20 tests)  [] Metals (implants/dental) (24 tests)  [] Local anaesthetics/NSAIDs (13 tests)  [] Antibiotics & Antimycotics (14 tests)   [] Corticosteroids (15 tests)   [] Photopatch test (62 tests)   [] Others:     [] Patient's Own Products:   DO NOT test if chemical or biological identity is unknown!   always ask from patient the product information and safety sheets (MSDS)       Order for PRICK TESTS    Reason for tests (suspected allergy): atopy?  Known previous allergies: prior     Standardized prick panels  [x] Atopic panel (20 tests)  [] Pediatric Panel (12 tests)  [] Milk, Meat, Eggs, Grains (20 tests)   [] Dust, Epithelia, Feathers (10 tests)  [] Fish, Seafood, Shellfish (17 tests)  [] Nuts, Beans (14 tests)  [] Spice, Vegetable, Fruit (17 tests)  [] Pollen Panel = Tree, Grass, Weed (24 tests)  [] Others:     [] Patient's Own Products:   DO NOT test if chemical or biological identity is unknown!   always ask from patient the product information and safety sheets (MSDS)     Standardized intradermal tests  [] Alternaria alternata  [] Aspergillus fumigatus  [] Cladosporium herbarum   [] Penicillium notatum  [] Dermatophagoides farinae  [] Dermatophagoides pteronyssinus  [] Dog Epithelium  [] Cat Epithelium  [] Others:     [] Bee venom   [] Wasp venom  !!Specific protocol with dilutions!!       Order for Drug allergy tests (prick & intradermal & patch tests)    [x] Penicillin G     [x] Ampicillin   [] Cefazolin  (1st gen)     [] Cefuroxime (2nd gen)     [] Ceftriaxone (3rd gen)     [] Ceftazidime (3rd gen)     [] Cefepime (4th gen)       [] Bactrim  [] Iodixanol (Visipaque)     [] Iopamidol (Isovue)       [] Iohexol (Omnipaque)  [x] Others: Augmentin, Erythromycin  [] Patient's Own:   Order for ... as test date  ________________________________    Assessment & Plan:    ==> Final Diagnosis:     # Ruling out multiple drug reactions (pre-transplant)  Penicillin and amoxicillin unlikely allergic reaction (localized rash)  Sulfa antibiotics, unknown reaction in childhood  Erythromycin, unclear reaction   * chronic illness with exacerbation, progression, side effects from treatment    # Suspicion for atopic predisposition with:   Seasonal RC and contact urticaria in summer and with grass  Proven eosinophilic esophagitis   Borderline RC to cat   * chronic illness with exacerbation, progression, side effects from treatment      These conclusions are made at the best of one's knowledge and belief based on the provided evidence such as patient's history and allergy test results and they can change over time or can be incomplete because of missing information.    ==> Treatment Plan:    >> Will plan for drug allergy tests and atopy screen.   - Advised patient to hold antihistamines for at least one week prior to allergy tests.     Procedures Performed: none    Staff Involved: Provider, Staff, Resident, and Scribe    Scribe Disclosure:   I, Humaira Sidhu, am serving as a scribe to document services personally performed by Brad Berry MD based on data collection and the provider's statements to me.     Staff Physician Comments:  I was present with the scribe who participated in the documentation of the note. I have verified the history and personally performed the physical exam and medical decision making. I agree with the assessment and plan as documented in the note. I have reviewed and if necessary amended the note.       Brad Berry MD  Professor  Head of Dermato-Allergy Division  Department of Dermatology  [facility]      Follow-up in Derm-Allergy clinic for allergy tests as planned   ____________________________________________    I spent a total of 46 minutes with Matthew Arias. This time was spent counseling the patient and/or coordinating care, explaining the allergy tests    Again, thank you for allowing me to participate in the care of your patient.        Sincerely,        Brad Berry MD    Electronically signed

## 2025-05-16 LAB — SCANNED LAB RESULT: NORMAL

## 2025-05-21 ENCOUNTER — TELEPHONE (OUTPATIENT)
Dept: ALLERGY | Facility: CLINIC | Age: 40
End: 2025-05-21

## 2025-05-21 NOTE — TELEPHONE ENCOUNTER
VMM left for pt to allergy clinic staff to reschedule medication allergy testing. Pt cancelled todays appt - medications already ordered so need an appt soon in order to use them.  Reach out to allergy clinic when pt returns call in order to schedule sooner rather than later

## 2025-05-28 ENCOUNTER — OFFICE VISIT (OUTPATIENT)
Dept: NEPHROLOGY | Facility: CLINIC | Age: 40
End: 2025-05-28
Payer: COMMERCIAL

## 2025-05-28 VITALS
BODY MASS INDEX: 17.43 KG/M2 | HEART RATE: 77 BPM | OXYGEN SATURATION: 96 % | WEIGHT: 125 LBS | SYSTOLIC BLOOD PRESSURE: 101 MMHG | DIASTOLIC BLOOD PRESSURE: 64 MMHG

## 2025-05-28 DIAGNOSIS — D63.1 ANEMIA IN STAGE 4 CHRONIC KIDNEY DISEASE (H): ICD-10-CM

## 2025-05-28 DIAGNOSIS — N18.4 CKD (CHRONIC KIDNEY DISEASE) STAGE 4, GFR 15-29 ML/MIN (H): Primary | ICD-10-CM

## 2025-05-28 DIAGNOSIS — N25.81 SECONDARY HYPERPARATHYROIDISM: ICD-10-CM

## 2025-05-28 DIAGNOSIS — D84.9 IMMUNOSUPPRESSION: ICD-10-CM

## 2025-05-28 DIAGNOSIS — Z94.4 HISTORY OF LIVER TRANSPLANT (H): ICD-10-CM

## 2025-05-28 DIAGNOSIS — N18.4 ANEMIA IN STAGE 4 CHRONIC KIDNEY DISEASE (H): ICD-10-CM

## 2025-05-28 DIAGNOSIS — R80.1 PERSISTENT PROTEINURIA: ICD-10-CM

## 2025-05-28 PROCEDURE — 1126F AMNT PAIN NOTED NONE PRSNT: CPT | Performed by: PHYSICIAN ASSISTANT

## 2025-05-28 PROCEDURE — 99215 OFFICE O/P EST HI 40 MIN: CPT | Performed by: PHYSICIAN ASSISTANT

## 2025-05-28 PROCEDURE — 3074F SYST BP LT 130 MM HG: CPT | Performed by: PHYSICIAN ASSISTANT

## 2025-05-28 PROCEDURE — 3078F DIAST BP <80 MM HG: CPT | Performed by: PHYSICIAN ASSISTANT

## 2025-05-28 RX ORDER — BUDESONIDE 1 MG/2ML
1 INHALANT ORAL DAILY
COMMUNITY

## 2025-05-28 ASSESSMENT — PAIN SCALES - GENERAL: PAINLEVEL_OUTOF10: NO PAIN (0)

## 2025-05-28 NOTE — PROGRESS NOTES
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  His Scr has been 2-2.5, now up to 3 range, eGFR 22-25 ml/min    CKD stage 4- Scr fluctuates between 2.6-3s eGFR was 28ml/min by cystatin C in 2022 , it was 2.7 eGFR 23 in October 2023 a little lower than creatinine of 2.8 with eGFR which was 28  Recurrent TRISTON episodes  B/l multiple renal cysts  Pt with baseline creatinine of 2.2 to 2.8 since 2017 with eGFR on 30 and 40's which put him in CKD stage 3b. Creatinine can up trend to 3's with eGFR in 20's but improves to eGFR of 30's. UA was bland without hematuria. Noted to have proteinuria but repeat was WNL. CT abdomen in June 2022 showed multiple cysts likely hemorrhagic but  stable with no nephrolithiasis or hydronephrosis noted. No genetic testing done.  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 Change in creatinine likely 2/2 hypovolemia and recent start of SGLT2 inhibitor for kidney protection . Pt denied any ibuprofen or other NSAID's Use.  - had recent transplanted liver biopsy as part of the kidney transplant eval   - 3/2025 Scr 2.7, eGFR 29, cystatin C 2.8, GFR 22  - UACR 104 mg/g from 1904 mg/g (1.2024)  He was previously noted to have 1.9 g of albuminuria, up to 3.5 grams of protein, then  back to 1 g/g cr.  Not sure if he was ill or had other factors.   Discussed option for ACE-I or SGLT2 inh and discussed this and we started SGLT2 inhibitor October 2024  - started jardiance 10 mg daily  - referred to transplant given GFR 22 by cystatin C  - approved and is completing the requirements  - referred for CKD journey  - completed kidney smart class 1/2025  - has low energy, decreased appetite   - labs today or tomorrow    Lower urinary tract symptoms   Bladder wall thickness increased  -not addressed today    Hypertension :  BP  101/64 in clinic, usually 110s/ at home.   Was on losartan but discontinued because of hypotension. Not on BP meds now.  - continue to monitor, proteinuria improving with jardiance    Electrolytes :  Hyperkalemia, mild, improved   K 4.9, Na 138   Continue good hydration, continuing bicarb supplementation will help  Follow low potassium diet    BMD :  3/26/25 Daniel was 8.8,  Low vitamin D 14  Secondary hyperparathyroidism   PTH is elevated likely 2/2 CKD.  - started cholecalciferol 1000mg daily  - check PTH and Vit D     Metabolic acidosis :   Bicarb 21  Taking sodium bicarb 650 mg BID  - if remains < 22, will increase sodium bicarb     Anemia :hgb 12, usually in 12-13 range, mild , iron sat 38%  - check iron status yearly  - no indication for epo      #Gastric/esophageal ulcer  had upper endoscopy on 5/27/25 with [facility].   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  Was on CNI, now on MMF and pred 5mg    #Disposition: labs tomorrow and follow up in 2 months with me, and September with Dr. Carmona.      Reason for visit: Followup CKD stage 4, post liver transplant    HPI: Matthew Arias is a 39 year old male with PMH significant for biliary atresia s/p 2 liver transplants, CKD, seizures who presents for followup of CKD.     Pt presented to nephrology to establish care for his CKD. He was first aware of CKD after his second transplant in 2001 Since that time he was known to have CKD that was slowly progressing. His transplant was done in [place], so he wasfollowing with nephrology there, but he moved to [place], he was recommend to establish nephrology care closer to him. He still see transplant team once a year in [place].  At one point he was seen in [place], last visit was in Dec 2022, but his insurance changed, thus established care with [facility].  No new changes  in his meds recently. Endorsed that with his first liver he had multiple rejection episodes but with second one, he has been more stable. He works full time at  but now off work / trying for disability. He was out for a while last year after breaking his finger.  He is applying for disability and has to appeal a denial.    We discussed his protein in the urine, and he started SGLT2 inhibitor in October 2024 for kidney protection; his transplant team was ok with this.       He was hospitalized in fall of 2024 for a few days with likely GI illness/ bug.    He is no longer working.     He is fatigued and has a decreased appetite. He has no n/v/d. He denies LE edema. He is scheduled for ECHO and stress exercise test. He has upcoming appt with infectious disease and allergy. He feels like there is no end in sight to his medical issues. He has been seeing a therapist.         Allergies   Allergen Reactions    Levofloxacin      Other Reaction(s): Renal Failure    Hx of renal failure    Amoxicillin-Pot Clavulanate Rash     Patient reported this was a couple years ago and he did not need treatment for the rash. Just stopped taking the medication.    Erythromycin      Other Reaction(s): GI UPSET, GI Upset    Penicillins Rash       Current Outpatient Medications   Medication Sig Dispense Refill    acetaminophen (TYLENOL) 500 MG tablet Take 500 mg by mouth (Patient not taking: Reported on 3/24/2025)      buPROPion (WELLBUTRIN SR) 100 MG 12 hr tablet Take 150 mg by mouth      empagliflozin (JARDIANCE) 10 MG TABS tablet Take 1 tablet (10 mg) by mouth daily. 90 tablet 3    FLUoxetine (PROZAC) 20 MG capsule TAKE 1 CAPSULE(20 MG) BY MOUTH EVERY MORNING      levETIRAcetam (KEPPRA) 500 MG tablet Take 500 mg by mouth every 12 hours      mycophenolate (GENERIC EQUIVALENT) 250 MG capsule Take 2 capsules by mouth every 12 hours      predniSONE (DELTASONE) 5 MG tablet Take 10 mg by mouth daily      sodium bicarbonate 650 MG tablet Take 1  tablet (650 mg) by mouth 2 times daily. 180 tablet 3    Vitamin D3 (CHOLECALCIFEROL) 25 mcg (1000 units) tablet Take 1 tablet (25 mcg) by mouth daily. 90 tablet 3     Current Facility-Administered Medications   Medication Dose Route Frequency Provider Last Rate Last Admin    amoxicillin-clavulanate (AUGMENTIN) 875-125 MG 1 tablet for allergy testing   Other Once Brad Berry MD        ampicillin (OMNIPEN) 250 mg for allergy testing   Other Once Brad Berry MD        erythromycin (ERYTHROCIN) 500 mg for allergy testing   Other Once Brad Berry MD        penicillin G potassium 500,000 Units for allergy testing   Other Once Brad Berry MD           Past Medical History:   Diagnosis Date    Anaplasmosis     Emanuel esophagus     Biliary atresia (H)     Brain aneurysm 02/2013    s/p clipping    Campylobacter gastroenteritis 11/2024    Cirrhosis of liver (H)     CKD (chronic kidney disease) stage 4, GFR 15-29 ml/min (H)     Colitis due to Clostridium difficile     Eosinophilic esophagitis     Epileptic grand mal status (H) 2019    Osteopenia     Renal cyst bilateral     Underweight      Biliary atresia    Past Surgical History:   Procedure Laterality Date    IR LIVER BIOPSY PERCUTANEOUS  4/30/2025    LIVER TRANSPLANTATION      1986, 2001     Liver transplant 1986 and 2000    Social History     Tobacco Use    Smoking status: Never    Smokeless tobacco: Never   Vaping Use    Vaping status: Never Used   Substance Use Topics    Alcohol use: Not Currently     Comment: Last drink 3 months ago    Drug use: Never       Family History   Problem Relation Age of Onset    No Known Problems Mother     No Known Problems Father     No Known Problems Sister     Kidney Disease Paternal Grandfather        ROS: A comprehensive review of systems was negative other than noted here or above.     Exam:  /64   Pulse 77   Wt 56.7 kg (125 lb)   SpO2 96%   BMI 17.43 kg/m      GENERAL APPEARANCE: alert and no  distress  LUNGS: CTA   CV: RRR  SKIN: no visible rash  NEURO: mentation intact and speech normal  PSYCH: affect normal/bright  Extremities:     Results:      Last Comprehensive Metabolic Panel:  Lab Results   Component Value Date     03/24/2025    POTASSIUM 4.9 03/24/2025    CHLORIDE 107 03/24/2025    CO2 21 (L) 03/24/2025    ANIONGAP 11 03/24/2025    GLC 74 03/24/2025    BUN 35.0 (H) 03/24/2025    CR 3.00 (H) 03/24/2025    GFRESTIMATED 26 (L) 03/24/2025    MYRNA 9.1 03/24/2025         Dee Dee Nesbitt PA-C    Visit length 20 minutes. An additional 20 minutes were spent on date of service in chart review, documentation, and other activities as noted.

## 2025-05-28 NOTE — PATIENT INSTRUCTIONS
Labs today or tomorrow at [facility], will call with results/recommendations.   Avoid ibuprofen/aleve/advil.   Continue good hydration, low sodium diet.   Check blood pressure at home, keep log.   Labs and follow up in 2 months with Dee Dee Nesbitt and in September with Dr. Carmona.

## 2025-05-28 NOTE — PROGRESS NOTES
[facility] [address] [address]  Phone: [phone]  Fax: [phone]    Patient:  Matthew Arias, Date of birth 1985  Date of Visit:  05/28/2025  Referring Provider Mily Boudreaux  Reason for visit: Pre-kidney transplant and vaccination recommendations    Assessment & Plan    Recommendations:   Vaccinations:   Season appropriate vaccinations including influenza and COVID vaccinations. We discussed optimal timing of getting these vaccines.   Prevnar 20 today, booster every 5 years  Pentavalent meningococcal vaccine is an option but there is a delay in the second dose of 6 months. The 2 meningococcal vaccines may be more efficient given transplant listing.   Meningococcal ACWY (MEQUADFI)- 2 dose vaccine series - 8 weeks apart, provide first dose today, booster every 5 years  Meningococcal B - 3 dose vaccine series at 0, 2 months, 6 months, booster every 5 years  Hepatitis B vaccination series. If hepatitis A serology is negative he can received the twinrix vaccine series. If the hepatitis A serology is positive he can receive the hepatitis B vaccine series alone  Also a candidate for the shingrix 2 vaccination series - 0 and 8 weeks  We can defer the HPV vaccination based on his lack of risk factors despite his age  Check H influenza Ab titer to determine if he needs the Hib vaccination  Will attempt to arrange nurse visits for these vaccinations  Risks and benefits of vaccination discussed with patient  He has not been on a daily prophylactic antibiotic and has done relatively well. I provided cephalexin 500 mg daily as needed for emergency if he develops a skin and soft tissue infection or pneumonia to start as an outpatient given his splenectomy and immunosuppression. He is aware that he also needs to alert a provide to his symptoms.   After kidney transplant he should be started on amoxicillin (pending allergy testing) or  cephalexin antibiotic prophylaxis given immunosuppression and splenectomy  Follow up in 3 months to monitor the progress of vaccinations given the complexity of the schedule - video visit is meli Reese D.O.   Infectious Diseases  Contact information available via [facility] Paging/Directory       Assessment:  38 y/o male with a history of biliary atresia s/p liver transplant x 3 (1986, 2001) on Cellcept and prednisone, splenectomy (1991), CKD, Campylobacter gastrointestinal infection (11/2024), eosinophilic esophagitis on PPI and sucralfate who is undergoing an evaluation for a kidney transplant.     History of splenectomy   History of liver transplant (1986, 2001) on immunosuppression  Vaccinations recommended prior to transplant  Pre-kidney transplant candidate: Vaccination records may not be completely thorough given the remote timing of his splenectomy in 1991. Prior to transplant it is recommended that he receive vaccination for encapsulated organisms such as H influenza type B (1 dose per lifetime), Streptococcus pneumoniae (prevnar 20), meningicoccoal (Men ACWY, Men B). He may have received these vaccine remotely but since we don't have these records will re-initiate vaccinations with boosters.   -Will check H influenzae titer to determine if he needs this H influenzae B vaccination x 1.    -Also a candidate for shingrix vaccination series.   -Based on his lack of risk factors does not need HPV vaccination.   -Will also check measles titer.   -Will check hepatitis A serology. If negative can receive twinrix vaccination for hep A.B vaccination or hepatitis B vaccination only if hep A serology is positive.      Antimicrobial prophylaxis in the setting of immunosuppression and splenectomy:  -He has not taken daily antimicrobial prophylaxis - possibly due to his allergies listed. He has had a few episodes of significant skin and soft tissue infections. I prescribed cephalexin to keep on hand if he  injuries himself to have available. He is aware that he can start this but then should notify a provider.  -After kidney transplant recommend that he be be placed on antimicrobial prophylaxis with amoxicillin or cephalexin given history of splenectomy and increased immunosuppression.   -He should also receive amoxicillin or another antibiotic prior to dental or other invasive procedures    Antimicrobials at the time of transplant and/or post transplant and/or antimicrobial prophylaxis considerations based on current or previous infections: no changes to the per-operative antimicrobial protocols required since it includes a beta lactam antibiotic    Allergy testing: numerous antibiotics allergies including levofloxacin, PCN, erythromycin, augmentin. All the allergies we discussed are low risk for a future reaction given the remote nature of the allergies in childhood. He doesn't know all the reactions that he had or they were minimal including a rash or GI intolerance. He likely can received these in an observed setting to monitor. Plans to undergo antibiotic allergy testing with Dr. Berry.     Education provided: vaccinations, safe living post transplant (animals, water, travel, vaccinations, pet handling, gardening/outdoor activites etc), prophylaxis. Education information provided in AVS. Specifically discussed water safety, hunting/pet restrictions, safety regarding outdoor work, travel medicine appt (if needed), vaccination timing. Educational information also provided in the AVS.     Transplant check list:  Current immunosuppression: cellcept and prednisone  Viral serostatus: CMV -, EBV +, HSV 1-/2-, VZV +  Fungal Prophylaxis: none  Bacterial prophylaxis: none  PJP prophylaxis: none  Ig on 3/26/25.   Qtc: 429  ms on 3/24/25      121 minutes spent by me on the date of the encounter doing chart review, review of outside records, review of test results, interpretation of tests, patient visit,  documentation, and discussion with other provider(s)          History of Present Illness     Pertinent history obtain from: chart review and patient    40 y/o male with a history of biliary atresia s/p liver transplant x 3 (1986, 2001) on Cellcept and prednisone, splenectomy (1991), CKD, Campylobacter gastrointestinal infection (11/2024), eosinophilic esophagitis (on PPI sucralfate, budesonide who is undergoing an evaluation for a kidney transplant. ID referral placed by the pre-kidney transplant team to evaluate his vaccination status in the setting of future kidney transplant and splenectomy. Referrals made for allergy.     Additionally, Matthew has dealt with eosinophilic esophagitis for a few years, which is being managed by a doctor at [facility]. He reports that his voice has been hoarse for a long time, which he attributes to multiple intubations.    Matthew has experienced fluctuations in his weight, losing and gaining up to 10 pounds within a month. He attributes this to a lack of appetite, which he believes is related to his medication. He often forgets to eat due to being busy and does not feel physically bothered by hunger until it becomes significant. Weight flucutates but is decreased because of his low appetite. He has not experienced nausea, vomiting, or diarrhea since his last C. diff infection. He reports random episodes of waking up with a wet shirt, indicating night sweats, but these occurrences are infrequent and not associated with any specific pattern. No current issues with his dentition, recently had a dental procedure for which he received prophylactic antibiotics.     Matthew is originally from [place] and now lives in [place], [place], with his wife and three children, aged 10, 14, and almost 16. He has an older daughter, aged 19, who lives elsewhere. He enjoys outdoor activities such as hunting, fishing, traveling, and spending time with his dogs. He reports having caught two black bears in the  past. He has traveled within the [place] but not outside the country, visiting several states including [place], [place], [place], [place], [place], [place], [place], and [place]. He was recently put on disability due to health issues but remains employed by [employer], where he works as a . He has no history of drug use and is sexually active with his wife. He has not been exposed to tuberculosis to his knowledge.    Matthew has a history of allergies to several antibiotics, including Augmentin, penicillin, and erythromycin, which caused a rash on his leg when he was around [age] years old. He also recalls having stomach issues with these medications. He is currently undergoing allergy testing to reassess these allergies. He has been managing eosinophilic esophagitis with medication, including budesonide, and reports that his healthcare providers have advised him to continue his current treatment plan.    History of splenectomy in 1991. Based on [facility] data (see below). Unable to find additional data in other health care systems including [facility] but he may have received vaccinations that are not captured in the records.   Most Recent Immunizations   Administered Date(s) Administered    COVID-19 Monovalent 18+ (Moderna) 04/15/2024    COVID-19 Vaccine (Lj) 04/13/2021    Flu, Unspecified 10/11/2016    [id number] Novel Flu 10/19/2009    Influenza (IIV3) PF 01/12/2012    Influenza Vaccine >6 months,quad, PF 01/25/2021    Influenza Vaccine, 6+MO IM (QUADRIVALENT W/PRESERVATIVES) 10/04/2023    Pneumo Conj 13-V (2010&after) 10/16/2017    Pneumococcal 23 valent 09/24/2018    TD,PF 7+ (Tenivac) 01/27/2014    TDAP (Adacel,Boostrix) 09/16/2022     Exposure History  Country of Origin: [place]  Places lived: [place], [place]  Persons they live with: wife and 3 older children (10, 14, 17)  Home setting (urban/rural): [place]  Travel inside the [place]: none  Travel outside of the [place]: [place], [place], [place], [place], [place], [place], [place], and [place]  Occupation (current/previous):fork  for [employer], no exposures to chemicals or dust  : none  Hobbies including outdoor activities: hunting, fishing, hanging out the dogs  Animal exposures: dogs at home, hunts bears, deer - skin and cook the meat himself.   Food/Diet: no special diet, does not eat raw or undercooked food  Water exposure/drinking water type: city water, parents and extended family use well water  Substance use: none  Sexual History: sexually active with his wife  TB Exposures: No known TB exposure  MDRO history: None    Active infections: none currently    Prior major infections:   -In 2001, shortly after his liver transplant, he developed a yeast infection in his bloodstream that spread to his abdomen, necessitating surgical intervention.   -He has also had recurrent Clostridioides difficile (C. diff) infections, experiencing four episodes over two years, with the last occurrence around 2016.   -Campylobacter gastroenteritis s/p 3 days of azithromycin in 11/2024   -Sinus infections  -Right knee joint infection  -Complicated skin and soft tissue infections requiring hospitalizations, last 2 years ago  --Matthew has a history of a severe leg infection during his senior year of high school, believed to be caused by a spider bite, which required a prolonged course of antibiotics and resulted in him using a wheelchair for two weeks.   -Anaplasmosis about four to five years ago, which caused high fevers and severe pain, making it difficult for him to walk.     Allergy Evaluation levofloxacin - reaction unclear but document renal dysfunction, amox - clav - rash on his leg 6-7 years, erythromycin - remote allergy from when he was child, PCN - associated w/ nausea, macrolids - remote allergy from when he was childhood. Planning for allergy test. [place] allergy test 20 [place] ago [place]. He was told at this time he had dairy allery.      Latent  Infection Screening  CMV : Seronegative  EBV : Seropositive  HSV : HSV 1-/ 2-  VZV : Seropositive  Toxoplasma : unknown  TB Quantiferon : Negative  Hepatitis B and C: Hepatitis B: Hepatitis B surface antibody -, Hepatitis B core antibody -, and Hepatitis B surface antigen -. If active hepatitis C Hepatitis C antibody negative.     Endemic Disease Testing: None specifically needed based on given history    Vaccinations:  Respiratory: due for prevnar 20 (hx of splenectomy)  Hepatitis: Hep B not immune  Shingrix: will recommend  HPV: no risk factors at this time  Meningococcus: Recommend, first dose today (hx of splenectomy)    Immunizations:  Immunization History   Administered Date(s) Administered    COVID-19 Monovalent 18+ (Moderna) 12/22/2021, 04/15/2024    COVID-19 Vaccine (Lj) 03/13/2021, 04/13/2021    Flu, Unspecified 10/25/2002, 10/15/2010, 10/11/2016    Q0o7-25 Novel Flu 10/19/2009    Influenza (IIV3) PF 10/20/2004, 10/18/2005, 01/12/2012    Influenza Vaccine >6 months,quad, PF 03/07/2016, 09/21/2017, 09/24/2018, 10/31/2019, 01/25/2021    Influenza Vaccine, 6+MO IM (QUADRIVALENT W/PRESERVATIVES) 11/01/2021, 10/04/2023    Pneumo Conj 13-V (2010&after) 07/29/2015, 10/16/2017    Pneumococcal 23 valent 10/26/2009, 09/24/2018    TD,PF 7+ (Tenivac) 01/27/2014    TDAP (Adacel,Boostrix) 05/03/2016, 09/16/2022      Physical Exam     Vital signs:  /67   Pulse 90   Temp 97.9  F (36.6  C)   Wt 55.3 kg (122 lb)   SpO2 98%   BMI 17.02 kg/m      GENERAL: cachectic appearing with thin face and body, pleasant, conversant  ENT: dry mouth, dentition okay, posterior pharynx clear where I could visualize it  NECK: no cervical adenopathy  RESP: lungs clear to auscultation - no rales, rhonchi or wheezes, RA, No accessory muscle use  CV: regular rate and rhythm, normal S1 S2,  no murmur, no peripheral edema  ABDOMEN: soft, nontender, not distended, old liver transplant scars  MS: no gross musculoskeletal defects  "noted, no edema  NEURO: Normal strength and tone, mentation intact and speech normal    Data   Laboratory data and imaging listed below was reviewed prior to this encounter.     Microbiology:  No results found for: \"CULTURE\", Hematology Studies:    Recent Labs   Lab Test 05/29/25  1035 04/30/25  1337 03/24/25  1620 10/16/24  0920 01/05/24  1445   WBC  --  9.3 10.0  --  13.9*   ANEU  --   --  4.3  --   --    AEOS  --   --  1.7*  --   --    HGB 12.2* 12.6* 11.6* 12.3* 11.2*   MCV 90 89 89  --  87   PLT  --  422 391  --  345    , Metabolic Studies:   Recent Labs   Lab Test 05/29/25  1035 03/24/25  1620 10/16/24  0920 04/15/24  0901 01/05/24  1445    139 137  --  136   POTASSIUM 4.1 4.9 4.0  --  3.8   CHLORIDE 108* 107 106 113* 105   CO2 22 21* 22  --  21*   BUN 29.2* 35.0* 31.4*  --  41.3*   CR 3.74* 3.00* 2.83*  --  2.55*   GFRESTIMATED 20* 26* 28*  --  32*   , Hepatic Studies:   Recent Labs   Lab Test 05/29/25 1035 03/24/25  1620 10/16/24  0920 01/05/24  1445   BILITOTAL  --  0.3  --   --    ALKPHOS  --  224*  --   --    ALBUMIN 4.0 4.0 3.8 3.6   AST  --  47*  --   --    ALT  --  42  --   --    , Hepatitis B Testing:   Recent Labs   Lab Test 03/24/25 1620   HBCAB Nonreactive   HEPBANG Nonreactive   , Hepatitis C Testing:   Hepatitis C Antibody   Date Value Ref Range Status   03/24/2025 Nonreactive Nonreactive Final     Comment:     A nonreactive screening test result does not exclude the possibility of exposure to or infection with HCV. Nonreactive screening test results in individuals with prior exposure to HCV may be due to antibody levels below the limit of detection of this assay or lack of reactivity to the HCV antigens used in this assay. Patients with recent HCV infections (<3 months from time of exposure) may have false-negative HCV antibody results due to the time needed for seroconversion (average of 8 to 9 weeks).   , TB Quant:   Recent Labs   Lab Test 03/24/25  1620   TBRES Negative   TBA1 0.08 "   TBA2 0.20   MMNIL 9.97   NIL 0.03   QGREY 0.03   QYELLOW 0.23   QPURPLE 10.00   , HSV 1/2 IgG: No lab results found. and VZV IgG:   Recent Labs   Lab Test 03/24/25  1620   VZVIGGIV 3.26   VZVIGG Positive   , CMV IgG:   Recent Labs   Lab Test 03/24/25  1620   CMVIGGIV 0.45   CMVIGG No detectable antibody.   , and EBV VCA:   Recent Labs   Lab Test 03/24/25  1620   EBVCAGIV 460.0*   EBVCAG Positive*

## 2025-05-29 ENCOUNTER — LAB (OUTPATIENT)
Dept: LAB | Facility: CLINIC | Age: 40
End: 2025-05-29
Payer: COMMERCIAL

## 2025-05-29 ENCOUNTER — OFFICE VISIT (OUTPATIENT)
Dept: INFECTIOUS DISEASES | Facility: CLINIC | Age: 40
End: 2025-05-29
Attending: NURSE PRACTITIONER
Payer: COMMERCIAL

## 2025-05-29 ENCOUNTER — PRE VISIT (OUTPATIENT)
Dept: INFECTIOUS DISEASES | Facility: CLINIC | Age: 40
End: 2025-05-29

## 2025-05-29 VITALS
BODY MASS INDEX: 17.02 KG/M2 | WEIGHT: 122 LBS | TEMPERATURE: 97.9 F | DIASTOLIC BLOOD PRESSURE: 67 MMHG | OXYGEN SATURATION: 98 % | HEART RATE: 90 BPM | SYSTOLIC BLOOD PRESSURE: 105 MMHG

## 2025-05-29 DIAGNOSIS — Z01.818 PRE-TRANSPLANT EVALUATION FOR KIDNEY TRANSPLANT: ICD-10-CM

## 2025-05-29 DIAGNOSIS — Z90.81 ACQUIRED ABSENCE OF SPLEEN: ICD-10-CM

## 2025-05-29 DIAGNOSIS — Z23 NEED FOR VACCINATION: ICD-10-CM

## 2025-05-29 DIAGNOSIS — K20.0 EOSINOPHILIC ESOPHAGITIS: ICD-10-CM

## 2025-05-29 DIAGNOSIS — D63.1 ANEMIA IN STAGE 4 CHRONIC KIDNEY DISEASE (H): ICD-10-CM

## 2025-05-29 DIAGNOSIS — Z94.4 HISTORY OF LIVER TRANSPLANT (H): ICD-10-CM

## 2025-05-29 DIAGNOSIS — A04.5 CAMPYLOBACTER GASTROENTERITIS: ICD-10-CM

## 2025-05-29 DIAGNOSIS — Z01.818 PRE-TRANSPLANT EVALUATION FOR KIDNEY TRANSPLANT: Primary | ICD-10-CM

## 2025-05-29 DIAGNOSIS — N18.4 ANEMIA IN STAGE 4 CHRONIC KIDNEY DISEASE (H): ICD-10-CM

## 2025-05-29 DIAGNOSIS — N25.81 SECONDARY HYPERPARATHYROIDISM: ICD-10-CM

## 2025-05-29 DIAGNOSIS — E55.9 HYPOVITAMINOSIS D: ICD-10-CM

## 2025-05-29 DIAGNOSIS — N18.4 STAGE 4 CHRONIC KIDNEY DISEASE (H): ICD-10-CM

## 2025-05-29 DIAGNOSIS — N18.4 CKD (CHRONIC KIDNEY DISEASE) STAGE 4, GFR 15-29 ML/MIN (H): ICD-10-CM

## 2025-05-29 LAB
ALBUMIN MFR UR ELPH: 51.1 MG/DL
ALBUMIN SERPL BCG-MCNC: 4 G/DL (ref 3.5–5.2)
ALBUMIN UR-MCNC: 30 MG/DL
ANION GAP SERPL CALCULATED.3IONS-SCNC: 10 MMOL/L (ref 7–15)
APPEARANCE UR: CLEAR
BILIRUB UR QL STRIP: NEGATIVE
BUN SERPL-MCNC: 29.2 MG/DL (ref 6–20)
CALCIUM SERPL-MCNC: 9.3 MG/DL (ref 8.8–10.4)
CHLORIDE SERPL-SCNC: 108 MMOL/L (ref 98–107)
COLOR UR AUTO: ABNORMAL
CREAT SERPL-MCNC: 3.74 MG/DL (ref 0.67–1.17)
CREAT UR-MCNC: 149 MG/DL
CREAT UR-MCNC: 149 MG/DL
CYSTATIN C (ROCHE): 3.2 MG/L (ref 0.6–1)
EGFRCR SERPLBLD CKD-EPI 2021: 20 ML/MIN/1.73M2
FERRITIN SERPL-MCNC: 119 NG/ML (ref 31–409)
GFR/BSA.PRED SERPLBLD CYS-BASED-ARV: 18 ML/MIN/1.73M2
GLUCOSE SERPL-MCNC: 94 MG/DL (ref 70–99)
GLUCOSE UR STRIP-MCNC: NEGATIVE MG/DL
HAV AB SER QL IA: NONREACTIVE
HCO3 SERPL-SCNC: 22 MMOL/L (ref 22–29)
HGB BLD-MCNC: 12.2 G/DL (ref 13.3–17.7)
HGB UR QL STRIP: NEGATIVE
HYALINE CASTS: 3 /LPF
IRON BINDING CAPACITY (ROCHE): 239 UG/DL (ref 240–430)
IRON SATN MFR SERPL: 35 % (ref 15–46)
IRON SERPL-MCNC: 84 UG/DL (ref 61–157)
KETONES UR STRIP-MCNC: NEGATIVE MG/DL
LEUKOCYTE ESTERASE UR QL STRIP: NEGATIVE
MCV RBC AUTO: 90 FL (ref 78–100)
MEV IGG SER IA-ACNC: 8.8 AU/ML
MEV IGG SER IA-ACNC: NORMAL
MICROALBUMIN UR-MCNC: 255 MG/L
MICROALBUMIN/CREAT UR: 171.14 MG/G CR (ref 0–17)
MUCOUS THREADS #/AREA URNS LPF: PRESENT /LPF
NITRATE UR QL: NEGATIVE
PH UR STRIP: 5.5 [PH] (ref 5–7)
PHOSPHATE SERPL-MCNC: 2.9 MG/DL (ref 2.5–4.5)
POTASSIUM SERPL-SCNC: 4.1 MMOL/L (ref 3.4–5.3)
PROT/CREAT 24H UR: 0.34 MG/MG CR (ref 0–0.2)
PTH-INTACT SERPL-MCNC: 84 PG/ML (ref 15–65)
RBC URINE: 1 /HPF
SODIUM SERPL-SCNC: 140 MMOL/L (ref 135–145)
SP GR UR STRIP: 1.01 (ref 1–1.03)
SQUAMOUS EPITHELIAL: <1 /HPF
T GONDII IGG SER QL: <3 IU/ML (ref 0–7.1)
UROBILINOGEN UR STRIP-MCNC: NORMAL MG/DL
VIT D+METAB SERPL-MCNC: 36 NG/ML (ref 20–50)
WBC URINE: 4 /HPF

## 2025-05-29 PROCEDURE — 90619 MENACWY-TT VACCINE IM: CPT | Performed by: INTERNAL MEDICINE

## 2025-05-29 PROCEDURE — 250N000011 HC RX IP 250 OP 636: Performed by: INTERNAL MEDICINE

## 2025-05-29 PROCEDURE — 99213 OFFICE O/P EST LOW 20 MIN: CPT | Performed by: INTERNAL MEDICINE

## 2025-05-29 PROCEDURE — 82610 CYSTATIN C: CPT | Performed by: PHYSICIAN ASSISTANT

## 2025-05-29 PROCEDURE — 82306 VITAMIN D 25 HYDROXY: CPT | Performed by: PHYSICIAN ASSISTANT

## 2025-05-29 PROCEDURE — 90677 PCV20 VACCINE IM: CPT | Performed by: INTERNAL MEDICINE

## 2025-05-29 PROCEDURE — 82043 UR ALBUMIN QUANTITATIVE: CPT | Performed by: PHYSICIAN ASSISTANT

## 2025-05-29 PROCEDURE — 86777 TOXOPLASMA ANTIBODY: CPT | Performed by: INTERNAL MEDICINE

## 2025-05-29 PROCEDURE — 86708 HEPATITIS A ANTIBODY: CPT | Performed by: INTERNAL MEDICINE

## 2025-05-29 PROCEDURE — 90471 IMMUNIZATION ADMIN: CPT | Performed by: INTERNAL MEDICINE

## 2025-05-29 PROCEDURE — 86765 RUBEOLA ANTIBODY: CPT | Performed by: INTERNAL MEDICINE

## 2025-05-29 PROCEDURE — 90472 IMMUNIZATION ADMIN EACH ADD: CPT | Performed by: INTERNAL MEDICINE

## 2025-05-29 PROCEDURE — G0009 ADMIN PNEUMOCOCCAL VACCINE: HCPCS | Performed by: INTERNAL MEDICINE

## 2025-05-29 RX ORDER — CEPHALEXIN 500 MG/1
500 CAPSULE ORAL DAILY
Qty: 7 CAPSULE | Refills: 0 | Status: SHIPPED | OUTPATIENT
Start: 2025-05-29 | End: 2025-05-29

## 2025-05-29 RX ORDER — CEPHALEXIN 500 MG/1
500 CAPSULE ORAL DAILY PRN
Qty: 7 CAPSULE | Refills: 0 | Status: SHIPPED | OUTPATIENT
Start: 2025-05-29

## 2025-05-29 RX ADMIN — PNEUMOCOCCAL 20-VALENT CONJUGATE VACCINE 0.5 ML
2.2; 2.2; 2.2; 2.2; 2.2; 2.2; 2.2; 2.2; 2.2; 2.2; 2.2; 2.2; 2.2; 2.2; 2.2; 2.2; 4.4; 2.2; 2.2; 2.2 INJECTION, SUSPENSION INTRAMUSCULAR at 10:02

## 2025-05-29 RX ADMIN — NEISSERIA MENINGITIDIS GROUP A CAPSULAR POLYSACCHARIDE TETANUS TOXOID CONJUGATE ANTIGEN, NEISSERIA MENINGITIDIS GROUP C CAPSULAR POLYSACCHARIDE TETANUS TOXOID CONJUGATE ANTIGEN, NEISSERIA MENINGITIDIS GROUP Y CAPSULAR POLYSACCHARIDE TETANUS TOXOID CONJUGATE ANTIGEN, AND NEISSERIA MENINGITIDIS GROUP W-135 CAPSULAR POLYSACCHARIDE TETANUS TOXOID CONJUGATE ANTIGEN 0.5 ML: 10; 10; 10; 10 INJECTION, SOLUTION INTRAMUSCULAR at 10:01

## 2025-05-29 ASSESSMENT — PAIN SCALES - GENERAL: PAINLEVEL_OUTOF10: NO PAIN (0)

## 2025-05-29 NOTE — NURSING NOTE
"Chief Complaint   Patient presents with    Consult     Campylobacter gastroenteritis       Vital signs:  Temp: 97.9  F (36.6  C)   BP: 105/67 Pulse: 90     SpO2: 98 %       Weight: 55.3 kg (122 lb)  Estimated body mass index is 17.02 kg/m  as calculated from the following:    Height as of 4/30/25: 1.803 m (5' 11\").    Weight as of this encounter: 55.3 kg (122 lb).      Lisbeth Gibson CMA   5/29/2025 8:44 AM    "

## 2025-05-29 NOTE — LETTER
5/29/2025       RE: Matthew Arias  [address]  [facility] [address]     Dear Colleague,    Thank you for referring your patient, Matthew Arias, to the [facility] [place] at [facility]. Please see a copy of my visit note below.      [facility] [place]  [address] [address]  Phone: [phone]  Fax: [phone]    Patient:  Matthew Arias, Date of birth 1985  Date of Visit:  05/28/2025  Referring Provider Mily Boudreaux  Reason for visit: Pre-kidney transplant and vaccination recommendations    Assessment & Plan   Recommendations:   Vaccinations:   Season appropriate vaccinations including influenza and COVID vaccinations. We discussed optimal timing of getting these vaccines.   Prevnar 20 today, booster every 5 years  Pentavalent meningococcal vaccine is an option but there is a delay in the second dose of 6 months. The 2 meningococcal vaccines may be more efficient given transplant listing.   Meningococcal ACWY (MEQUADFI)- 2 dose vaccine series - 8 weeks apart, provide first dose today, booster every 5 years  Meningococcal B - 3 dose vaccine series at 0, 2 months, 6 months, booster every 5 years  Hepatitis B vaccination series. If hepatitis A serology is negative he can received the twinrix vaccine series. If the hepatitis A serology is positive he can receive the hepatitis B vaccine series alone  Also a candidate for the shingrix 2 vaccination series - 0 and 8 weeks  We can defer the HPV vaccination based on his lack of risk factors despite his age  Check H influenza Ab titer to determine if he needs the Hib vaccination  Will attempt to arrange nurse visits for these vaccinations  He has not been on a daily prophylactic antibiotic and has done relatively well. I provided cephalexin 500 mg daily as needed for emergency if he develops a skin and  Problem: Patient Care Overview  Goal: Plan of Care Review  Outcome: Ongoing (interventions implemented as appropriate)   08/06/18 3052   Coping/Psychosocial   Plan of Care Reviewed With patient   Plan of Care Review   Progress improving   OTHER   Outcome Summary Pt's BP has decreased with the lisinopril. Pt still requiring small amount of Cardene to maintain BP parameteres. Will continue to wean as tolerated.        Problem: Fall Risk (Adult)  Goal: Absence of Fall  Outcome: Ongoing (interventions implemented as appropriate)      Problem: Skin Injury Risk (Adult)  Goal: Skin Health and Integrity  Outcome: Ongoing (interventions implemented as appropriate)         soft tissue infection or pneumonia to start as an outpatient given his splenectomy and immunosuppression. He is aware that he also needs to alert a provide to his symptoms.   After kidney transplant he should be started on amoxicillin (pending allergy testing) or cephalexin antibiotic prophylaxis given immunosuppression and splenectomy  Follow up in 3 months to monitor the progress of vaccinations given the complexity of the schedule - video visit is meli Reese D.O.   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     History of splenectomy   History of liver transplant (1986, 2001) on immunosuppression  Vaccinations recommended prior to transplant  Pre-kidney transplant candidate: Vaccination records may not be completely thorough given the remote timing of his splenectomy in 1991. Prior to transplant it is recommended that he receive vaccination for encapsulated organisms such as H influenza type B (1 dose per lifetime), Streptococcus pneumoniae (prevnar 20), meningicoccoal (Men ACWY, Men B). He may have received these vaccine remotely but since we don't have these records will re-initiate vaccinations with boosters.   -Will check H influenzae titer to determine if he needs this H influenzae B vaccination x 1.    -Also a candidate for shingrix vaccination series.   -Based on his lack of risk factors does not need HPV vaccination.   -Will also check measles titer.   -Will check hepatitis A serology. If negative can receive twinrix vaccination for hep A.B vaccination or hepatitis B vaccination only if hep A serology is positive.      Antimicrobial prophylaxis in the  setting of immunosuppression and splenectomy:  -He has not taken daily antimicrobial prophylaxis - possibly due to his allergies listed. He has had a few episodes of significant skin and soft tissue infections. I prescribed cephalexin to keep on hand if he injuries himself to have available. He is aware that he can start this but then should notify a provider.  -After kidney transplant recommend that he be be placed on antimicrobial prophylaxis with amoxicillin or cephalexin given history of splenectomy and increased immunosuppression.   -He should also receive amoxicillin or another antibiotic prior to dental or other invasive procedures    Antimicrobials at the time of transplant and/or post transplant and/or antimicrobial prophylaxis considerations based on current or previous infections: no changes to the per-operative antimicrobial protocols required since it includes a beta lactam antibiotic    Allergy testing: numerous antibiotics allergies including levofloxacin, PCN, erythromycin, augmentin. All the allergies we discussed are low risk for a future reaction given the remote nature of the allergies in childhood. He doesn't know all the reactions that he had or they were minimal including a rash or GI intolerance. He likely can received these in an observed setting to monitor. Plans to undergo antibiotic allergy testing with Dr. Berry.     Education provided: vaccinations, safe living post transplant (animals, water, travel, vaccinations, pet handling, gardening/outdoor activites etc), prophylaxis. Education information provided in AVS. Specifically discussed water safety, hunting/pet restrictions, safety regarding outdoor work, travel medicine appt (if needed), vaccination timing. Educational information also provided in the AVS.     Transplant check list:  Current immunosuppression: cellcept and prednisone  Viral serostatus: CMV -, EBV +, HSV 1-/2-, VZV +  Fungal Prophylaxis: none  Bacterial prophylaxis:  none  PJP prophylaxis: none  Ig on 3/26/25.   Qtc: 429  ms on 3/24/25      121 minutes spent by me on the date of the encounter doing chart review, review of outside records, review of test results, interpretation of tests, patient visit, documentation, and discussion with other provider(s)          History of Present Illness    Pertinent history obtain from: chart review and patient    38 y/o male with a history of biliary atresia s/p liver transplant x 3 (, ) on Cellcept and prednisone, splenectomy (), CKD, Campylobacter gastrointestinal infection (2024), eosinophilic esophagitis (on PPI sucralfate, budesonide who is undergoing an evaluation for a kidney transplant. ID referral placed by the pre-kidney transplant team to evaluate his vaccination status in the setting of future kidney transplant and splenectomy. Referrals made for allergy.     Additionally, Matthew has dealt with eosinophilic esophagitis for a few years, which is being managed by a doctor at [facility]. He reports that his voice has been hoarse for a long time, which he attributes to multiple intubations.    Matthew has experienced fluctuations in his weight, losing and gaining up to 10 pounds within a month. He attributes this to a lack of appetite, which he believes is related to his medication. He often forgets to eat due to being busy and does not feel physically bothered by hunger until it becomes significant. Weight flucutates but is decreased because of his low appetite. He has not experienced nausea, vomiting, or diarrhea since his last C. diff infection. He reports random episodes of waking up with a wet shirt, indicating night sweats, but these occurrences are infrequent and not associated with any specific pattern. No current issues with his dentition, recently had a dental procedure for which he received prophylactic antibiotics.     Matthew is originally from [place] and now lives in [place], [place], with his wife and  three children, aged 10, 14, and almost 16. He has an older daughter, aged 19, who lives elsewhere. He enjoys outdoor activities such as hunting, fishing, traveling, and spending time with his dogs. He reports having caught two black bears in the past. He has traveled within the [place] but not outside the country, visiting several states including [place], [place], [place], [place], [place], [place], [place], and [place]. He was recently put on disability due to health issues but remains employed by [employer], where he works as a . He has no history of drug use and is sexually active with his wife. He has not been exposed to tuberculosis to his knowledge.    Matthew has a history of allergies to several antibiotics, including Augmentin, penicillin, and erythromycin, which caused a rash on his leg when he was around [age] or [age] years old. He also recalls having stomach issues with these medications. He is currently undergoing allergy testing to reassess these allergies. He has been managing eosinophilic esophagitis with medication, including budesonide, and reports that his healthcare providers have advised him to continue his current treatment plan.    History of splenectomy in 1991. Based on [facility] data (see below). Unable to find additional data in other health care systems including [facility] but he may have received vaccinations that are not captured in the records.   Most Recent Immunizations   Administered Date(s) Administered     COVID-19 Monovalent 18+ (Moderna) 04/15/2024     COVID-19 Vaccine (Lj) 04/13/2021     Flu, Unspecified 10/11/2016     [id number] Novel Flu 10/19/2009     Influenza (IIV3) PF 01/12/2012     Influenza Vaccine >6 months,quad, PF 01/25/2021     Influenza Vaccine, 6+MO IM (QUADRIVALENT W/PRESERVATIVES) 10/04/2023     Pneumo Conj 13-V (2010&after) 10/16/2017     Pneumococcal 23 valent 09/24/2018     TD,PF 7+ (Tenivac) 01/27/2014     TDAP (Adacel,Boostrix)  09/16/2022     Exposure History  Country of Origin: [place]  Places lived: [place], [place]  Persons they live with: wife and 3 older children (10, 14, 17)  Home setting (urban/rural): [place]  Travel inside the [place]: none  Travel outside of the [place]: [place], [place], [place], [place], [place], [place], [place], and [place]  Occupation (current/previous):fork  for "[employer] no exposures to chemicals or dust  : none  Hobbies including outdoor activities: hunting, fishing, hanging out the dogs  Animal exposures: dogs at home, hunts bears, deer - skin and cook the meat himself.   Food/Diet: no special diet, does not eat raw or undercooked food  Water exposure/drinking water type: city water, parents and extended family use well water  Substance use: none  Sexual History: sexually active with his wife  TB Exposures: No known TB exposure  MDRO history: None    Active infections: none currently    Prior major infections:   -In 2001, shortly after his liver transplant, he developed a yeast infection in his bloodstream that spread to his abdomen, necessitating surgical intervention.   -He has also had recurrent Clostridioides difficile (C. diff) infections, experiencing four episodes over two years, with the last occurrence around 2016.   -Campylobacter gastroenteritis s/p 3 days of azithromycin in 11/2024   -Sinus infections  -Right knee joint infection  -Complicated skin and soft tissue infections requiring hospitalizations, last 2 years ago  --Matthew has a history of a severe leg infection during his senior year of high school, believed to be caused by a spider bite, which required a prolonged course of antibiotics and resulted in him using a wheelchair for two weeks.   -Anaplasmosis about four to five years ago, which caused high fevers and severe pain, making it difficult for him to walk.     Allergy Evaluation levofloxacin - reaction unclear but document renal dysfunction, amox - clav - rash on his leg 6-7 years,  erythromycin - remote allergy from when he was child, PCN - associated w/ nausea, macrolids - remote allergy from when he was childhood. Planning for allergy test. Last allergy test 20 years ago in [place], [place]. He was told at this time he had dairy allery.      Latent Infection Screening  CMV : Seronegative  EBV : Seropositive  HSV : HSV 1-/ 2-  VZV : Seropositive  Toxoplasma : unknown  TB Quantiferon : Negative  Hepatitis B and C: Hepatitis B: Hepatitis B surface antibody -, Hepatitis B core antibody -, and Hepatitis B surface antigen -. If active hepatitis C Hepatitis C antibody negative.     Endemic Disease Testing: None specifically needed based on given history    Vaccinations:  Respiratory: due for prevnar 20 (hx of splenectomy)  Hepatitis: Hep B not immune  Shingrix: will recommend  HPV: no risk factors at this time  Meningococcus: Recommend, first dose today (hx of splenectomy)    Immunizations:  Immunization History   Administered Date(s) Administered     COVID-19 Monovalent 18+ (Moderna) 12/22/2021, 04/15/2024     COVID-19 Vaccine (Lj) 03/13/2021, 04/13/2021     Flu, Unspecified 10/25/2002, 10/15/2010, 10/11/2016     [id number] Novel Flu 10/19/2009     Influenza (IIV3) PF 10/20/2004, 10/18/2005, 01/12/2012     Influenza Vaccine >6 months,quad, PF 03/07/2016, 09/21/2017, 09/24/2018, 10/31/2019, 01/25/2021     Influenza Vaccine, 6+MO IM (QUADRIVALENT W/PRESERVATIVES) 11/01/2021, 10/04/2023     Pneumo Conj 13-V (2010&after) 07/29/2015, 10/16/2017     Pneumococcal 23 valent 10/26/2009, 09/24/2018     TD,PF 7+ (Tenivac) 01/27/2014     TDAP (Adacel,Boostrix) 05/03/2016, 09/16/2022      Physical Exam    Vital signs:  /67   Pulse 90   Temp 97.9  F (36.6  C)   Wt 55.3 kg (122 lb)   SpO2 98%   BMI 17.02 kg/m      GENERAL: cachectic appearing with thin face and body, pleasant, conversant  ENT: dry mouth, dentition okay, posterior pharynx clear where I could visualize it  NECK: no cervical  "adenopathy  RESP: lungs clear to auscultation - no rales, rhonchi or wheezes, RA, No accessory muscle use  CV: regular rate and rhythm, normal S1 S2,  no murmur, no peripheral edema  ABDOMEN: soft, nontender, not distended, old liver transplant scars  MS: no gross musculoskeletal defects noted, no edema  NEURO: Normal strength and tone, mentation intact and speech normal    Data  Laboratory data and imaging listed below was reviewed prior to this encounter.     Microbiology:  No results found for: \"CULTURE\", Hematology Studies:    Recent Labs   Lab Test 05/29/25  1035 04/30/25  1337 03/24/25  1620 10/16/24  0920 01/05/24  1445   WBC  --  9.3 10.0  --  13.9*   ANEU  --   --  4.3  --   --    AEOS  --   --  1.7*  --   --    HGB 12.2* 12.6* 11.6* 12.3* 11.2*   MCV 90 89 89  --  87   PLT  --  422 391  --  345    , Metabolic Studies:   Recent Labs   Lab Test 05/29/25  1035 03/24/25  1620 10/16/24  0920 04/15/24  0901 01/05/24  1445    139 137  --  136   POTASSIUM 4.1 4.9 4.0  --  3.8   CHLORIDE 108* 107 106 113* 105   CO2 22 21* 22  --  21*   BUN 29.2* 35.0* 31.4*  --  41.3*   CR 3.74* 3.00* 2.83*  --  2.55*   GFRESTIMATED 20* 26* 28*  --  32*   , Hepatic Studies:   Recent Labs   Lab Test 05/29/25  1035 03/24/25  1620 10/16/24  0920 01/05/24  1445   BILITOTAL  --  0.3  --   --    ALKPHOS  --  224*  --   --    ALBUMIN 4.0 4.0 3.8 3.6   AST  --  47*  --   --    ALT  --  42  --   --    , Hepatitis B Testing:   Recent Labs   Lab Test 03/24/25 1620   HBCAB Nonreactive   HEPBANG Nonreactive   , Hepatitis C Testing:   Hepatitis C Antibody   Date Value Ref Range Status   03/24/2025 Nonreactive Nonreactive Final     Comment:     A nonreactive screening test result does not exclude the possibility of exposure to or infection with HCV. Nonreactive screening test results in individuals with prior exposure to HCV may be due to antibody levels below the limit of detection of this assay or lack of reactivity to the HCV antigens " used in this assay. Patients with recent HCV infections (<3 months from time of exposure) may have false-negative HCV antibody results due to the time needed for seroconversion (average of 8 to 9 weeks).   , TB Quant:   Recent Labs   Lab Test 03/24/25  1620   TBRES Negative   TBA1 0.08   TBA2 0.20   MMNIL 9.97   NIL 0.03   QGREY 0.03   QYELLOW 0.23   QPURPLE 10.00   , HSV 1/2 IgG: No lab results found. and VZV IgG:   Recent Labs   Lab Test 03/24/25  1620   VZVIGGIV 3.26   VZVIGG Positive   , CMV IgG:   Recent Labs   Lab Test 03/24/25  1620   CMVIGGIV 0.45   CMVIGG No detectable antibody.   , and EBV VCA:   Recent Labs   Lab Test 03/24/25  1620   EBVCAGIV 460.0*   EBVCAG Positive*                     Again, thank you for allowing me to participate in the care of your patient.      Sincerely,    Carine Reese, DO

## 2025-05-29 NOTE — PATIENT INSTRUCTIONS
Please get some additional labs completed today    Based on our discussion, I have outlined the following instructions for you:  - You need to get booster shots for certain infections every five years to stay protected, especially since you don't have a spleen. We will check your blood to see if you are still protected against a specific infection (H influenzae) that you only get vaccinated for once.  - We will start some of your vaccinations today, and then you will have more appointments with the nurse to finish them. We'll make sure these fit with your other appointments.  -After your transplant we will start you on a daily antibiotic for prophylaxis with amoxicillin or cephalexin depending on your allergy testing.  -I have also provided you an antibiotic prescription if you develop a sinus or skin infection to take as a precaution before you can be seen by a doctor.     Vaccination schedule:   --You will get the prevnar 20 vaccination today, you will need a booster of this every 5 years  --You will get the Menquadfi vaccine today, you will need a second dose in 8 weeks. After this you will need a booster in 5 years.   --You will need a second meningococcal vaccine called MenB- this is a 3 dose series including a dose at 0,2,6 months. After this you will need a booster every 5 years.   --I will check your hepatitis A serology. If this is positive, meaning you have received the vaccine then you will only need the hepatitis B vaccine. If this is negative than we will give you the hepatitis A/B vaccine for prevention.       Additional information:  - Try to avoid gardening and working outside, especially in the first year after your transplant, to lower the risk of infections.  - Wear a mask when doing activities that might stir up dust or other particles to keep your airways and sinuses safe.  - When you travel, it's best to drink bottled water to avoid any germs in water you're not used to, especially after your  transplant.  - Make sure to get your COVID vaccine every year, just like you do with the flu shot, to keep your immunity strong.  - You will receive some helpful information after your visit about how to take care of yourself after your transplant, including tips on gardening and staying safe with water.    Thank you again for your visit, and we look forward to supporting you in your journey to better health.    Thank you for coming in today. It was great to meet you and I wish you the best in your transplant evaluation journey. A few education pieces we discussed today in preparation of transplant include:     Gardening/farming/wood cutting:   After transplant you will be at risk for fungal infections. Within the first year of transplant you will want to avoid doing activities where you could inhale dust or soil or be punctured by a tool that has been in touch with the earth. There is bacterial and fungus in the soil that we want you to avoid.   Going barefoot outside should be avoided    Hands should be washed: this is a similar practice as you did before transplant but you will want to be more conscious about washing your hands before preparing food and before eating, after touching pets, after touching secretions. Ideally family members will help you change children's diapers and/or if animal excretions need to be cleaned.     Preventing respiratory viruses:   Frequent and thorough hand washing, particularly prior to touch?ing mucous membranes  Avoiding close contact with persons with respiratory illnesses.   If contact is unavoidable, ideally both the infected person and thetransplant recipient should wear a standard surgical mask.  Avoiding crowded areas, such as shopping malls, subways, eleva?tors, where close contact with persons with respiratory illness islikely. Though continually avoiding these areas is unrealistic, caution is advised during periods of enhanced immunosuppression.   Caution should also be  "increased when viruses are circulatingin the community such as epidemic influenza.  Avoid tobacco smoke. Smoking and exposure to environmental tobacco smoke are risk factors for bacterial and community acquired viral infections.  Marijuana smoking or vaporization of the plant product shouldalso be avoided due to exposure to fungal spores and bacteria.   Avoid exposure to persons with known active tuberculosis and avoid activities and occupational settings that increase the risk of exposure to tuberculosis such as working in prisons, jails,homeless shelters, and certain healthcare settings.  Avoid occupation risks in certain animal care settings, construction, gardening, landscaping, and farming. These activities can put you at risk to be exposed to fungus. If this is required please discuss with the transplant team.   Home remodeling can also place you at risk for exposures to fungal infections and should be planned cautiously  Avoiding exposure to fungal spores by avoiding plant and soil aerosols (such as mulching), pigeon,and other bird droppings, chicken coops, and caves.  Consideration for wearing a mask if exposure to above high?risk areas is unavoidable    Preventing waterborne infections:   Close attention should be paid to local government providing recommendations for \"boil water\" alerts  During outbreaks boiling water for at least 1 minute is recommended.    Well water from private or public wells in areas that are not screened frequently for bacterial pathogens should be avoided due to the risk for parasite and coliform contamination.   If well water is used, it should be tested yearly for microbial contamination (However, it is even safer to boil well water or use bottled water for drinking andbrushing teeth).  Other options include water filter systems with small micron filters (at least < 1 micron filter) and/or drinking bottle water.   Transplant recipients should not drink water directly from lakes " or rivers or pools due to risk of parasite and coliform contamination  To avoid spreading infection to others, transplant recipients who have had diarrhea should not use public recreational water facilities for 2 weeks after symptoms have resolved.  Hot tubs have been associated with several infection risks therefore should be avoided.  When traveling to countries with poor sanitation, drinking tapwater as well as inadvertent consumption from ice cubes, brushing teeth, or during showering should be avoided.  Abrasions incurred during bathing in ocean or fresh water should be thoroughly cleaned with an uncontaminated water source due to risk of infection    Preventing mosquito borne infections:  Avoid going out at adriel or dusk and during peak mosquito feeding - if needed you can use effective insect repellents that contain DEET.  You can also wear permethrin?treated protective in areas where transmissionis occurring. Keeping your head and body covered is a way to protect yourself.  Sources of standing water, such as old tires, should be removed from yards and property belonging to transplant recipients.  Specific prophylaxis should be discussed at a travel medicine appointment with ID providers if traveling outside the  where malaria, Zikka, and other infections are a risk.    Food safety:   Do not drink unpasteurized milk, fruit, or vegetable juice or eat unpasteurized cheeses  Avoid eating raw or undercooked eggs including foods containing raw eggs (eg, uncooked cake and cookie batter)   Avoid eating raw or undercooked meat, poultry, or fish  Avoid eating uncooked castellon, meat spreads, cold cuts, and smoked seafood.  Be cautious when eating from public salad bars or buffets, street vendors, picnics where food has sat out at room temperature or if hygiene standards are unknown  Avoid eating food that was prepared by someone with a recent diarrheal illness.  Pay attention to food recalls     Pet Ownership:   Reptiles  and amphibians (snakes, iguanas, lizards, and turtles) should be avoided.   Chicks and ducklings should be avoided.   Regarding pet vaccines, it is preferable to avoid live vaccines.  If live vaccines are given, (eg, live attenuatedvaccine for B bronchiseptica [kennel cough]) it would be advisable to nott hold the dog during vaccination, avoid direct contact with the vaccine, and to avoid direct contact with the dogs nose,mouth or face after vaccination.   If you have a cat you should not be handing their liter box after transplant.     Sexual practices:   Always use latex condoms during sexual contact outside of longterm monogamous relationships to reduce exposure to sexually transmitted infections  Consider using latex condoms during sexual activity even with long term monogamous partners during periods of increased immunosuppression.  Avoid exposure to feces during sexual activity.   If you are candidate for HPV vaccine based on your age as indicated by your provides than you should receive it. The helps to prevent the development of HPV?related malignancies,for which transplant recipients are at increased risk.    Travel safety:   There are travel medicine providers through the Infectious Diseases clinic that are available to do a pre-travel evaluation. Please schedule this appointment 6-8 weeks prior to your departure.

## 2025-05-30 ENCOUNTER — PRE VISIT (OUTPATIENT)
Dept: ALLERGY | Facility: CLINIC | Age: 40
End: 2025-05-30

## 2025-06-02 ENCOUNTER — RESULTS FOLLOW-UP (OUTPATIENT)
Dept: NEPHROLOGY | Facility: CLINIC | Age: 40
End: 2025-06-02
Payer: COMMERCIAL

## 2025-06-03 ENCOUNTER — TELEPHONE (OUTPATIENT)
Dept: INFECTIOUS DISEASES | Facility: CLINIC | Age: 40
End: 2025-06-03

## 2025-06-03 DIAGNOSIS — A04.5 CAMPYLOBACTER GASTROENTERITIS: ICD-10-CM

## 2025-06-04 NOTE — TELEPHONE ENCOUNTER
Tried calling patient to try and schedule a follow up appt between now and September (July). No answer.

## 2025-06-06 ENCOUNTER — RESULTS FOLLOW-UP (OUTPATIENT)
Dept: INFECTIOUS DISEASES | Facility: CLINIC | Age: 40
End: 2025-06-06

## 2025-06-06 DIAGNOSIS — A04.5 CAMPYLOBACTER GASTROENTERITIS: ICD-10-CM

## 2025-06-09 ENCOUNTER — TELEPHONE (OUTPATIENT)
Dept: INFECTIOUS DISEASES | Facility: CLINIC | Age: 40
End: 2025-06-09
Payer: COMMERCIAL

## 2025-06-09 DIAGNOSIS — A04.5 CAMPYLOBACTER GASTROENTERITIS: ICD-10-CM

## 2025-06-09 DIAGNOSIS — Z23 NEED FOR MENINGOCOCCUS VACCINE: ICD-10-CM

## 2025-06-09 DIAGNOSIS — Z92.29 HEPATITIS A AND HEPATITIS B VACCINE ADMINISTERED: Primary | ICD-10-CM

## 2025-06-09 DIAGNOSIS — Z23 NEED FOR VACCINATION: ICD-10-CM

## 2025-06-09 NOTE — TELEPHONE ENCOUNTER
Twinrix vaccine series ordered, start date 6/24/25    First HIB vaccine ordered, start date 6/24/25    Routed to Dr. Reese     1. Was correct HIB vaccine ordered?      2. What are the intervals for subsequent HIB vaccines?      3. There is ID appt for 6/24/25 (first of both series); next appt is 8/26/25. For twinrix, is vaccination at months 0, 2, and 6 okay?      4. Did you want any other vaccines at these appts? Your 5/29 note states:     Meningococcal B - 3 dose vaccine series at 0, 2 months, 6 months, booster every 5 years  Also a candidate for the shingrix 2 vaccination series - 0 and 8 weeks   Do you want to order Meningococcal B or shingrix vs hold off for now?      Update 6/16: Call to [facility]: Unable to see HIB vaccine order. Writer is working with ID ml's to resolve.

## 2025-06-12 ENCOUNTER — RESULTS FOLLOW-UP (OUTPATIENT)
Dept: TRANSPLANT | Facility: CLINIC | Age: 40
End: 2025-06-12

## 2025-06-12 DIAGNOSIS — A04.5 CAMPYLOBACTER GASTROENTERITIS: ICD-10-CM

## 2025-06-15 ENCOUNTER — HEALTH MAINTENANCE LETTER (OUTPATIENT)
Age: 40
End: 2025-06-15

## 2025-06-16 ENCOUNTER — TELEPHONE (OUTPATIENT)
Dept: TRANSPLANT | Facility: CLINIC | Age: 40
End: 2025-06-16
Payer: COMMERCIAL

## 2025-06-16 DIAGNOSIS — Z76.82 AWAITING ORGAN TRANSPLANT: Primary | ICD-10-CM

## 2025-06-16 DIAGNOSIS — A04.5 CAMPYLOBACTER GASTROENTERITIS: ICD-10-CM

## 2025-06-16 NOTE — TELEPHONE ENCOUNTER
Listed pt today on the kidney alone waitlist on INACTIVE status due to an incomplete evaluation.  Pt is not yet on dialysis. Pt does qualify for wait time with a GFR of 20 done on 05/29/2025. Generated listing letter today in EPIC  - electronically sent to pt/ providers.     Called patient today, reached his . NATHALY that I am calling to let him know he is listed now on the kidney transplant waitlist on INACTIVE status. Asked pt to return my call to review his status.

## 2025-06-16 NOTE — LETTER
PHYSICIAN ORDER   ALA/PRA BLOOD    DATE & TIME ISSUED: 2025 4:37 PM  PATIENT NAME: Matthew Arias   : 1985     [facility] MR#  [id number]     DIAGNOSIS/ICD-10 CODE: Awaiting Organ transplant [Z76.82}   EXPIRES: (1 YEAR AFTER DATE ISSUED)  EVERY 12 weeks / 3 months   1. Please draw 20ml of blood in red top (plain) tube for Antileukocyte Antibody (ALA or PRA).   2. Label tubes with the patient s name, complete lab slip.         3. Mailers, lab slips with instructions are sent to patient separately.      4. Call the Outreach Lab at [phone] to reorder mailers.       5. Mail blood to (this address is also on the mailers):    IMMUNOLOGY LABORATORY   [facility]   Room [phone]  [address]    .  Kirill Wood in Immunology and Transplantation  Surgical Director, Kidney & Pancreas Transplant Programs  Medical Director, Solid Organ Transplant Unit

## 2025-06-16 NOTE — PROGRESS NOTES
[facility] Dermatology Note  Encounter Date: Jun 17, 2025  Office Visit     Dermatology Problem List:  1.  Transplant patient, first transplant >15 years ago    ____________________________________________    Assessment & Plan:     # History of Transplant  # History of Liver transplant x 2  - Annual TBSE, sooner for spot checks/areas of concern.  - Reviewed the risk of immunosuppression, and increased risk of NMSC  - Continue sun protection and sun avoidance.   - SPF >30 daily, especially face and photo exposed areas.    Awaiting listing for kidney transplant.    # Actinic skin damage  # No personal history of nonmelanoma skin cancer.  - Reviewed photo protection and photo avoidance.  There is no safe tan.  - Continue SPF daily and Q2-3 hours with sun exposure.  - Encouraged sun avoidance and physical barriers.       Procedures Performed:   None  None    Follow-up: 1 year(s) in-person, or earlier for new or changing lesions    Staff:     Angelika Álvarez MD PhD  Dermatology, Dermatopathology    ____________________________________________    CC: Skin Check      HPI:  Matthew Arias is a(n) 39 year old male who presents today as a new patient for skin check.  Denies any areas of concern.  Notes that he has had a mole on his right shin for many many years, not changing not growing.    Patient is otherwise feeling well, without additional skin concerns.     Labs Reviewed:    Lab Results   Component Value Date    WBC 9.3 04/30/2025    HGB 12.2 (L) 05/29/2025    HCT 39.9 (L) 04/30/2025     04/30/2025     05/29/2025    POTASSIUM 4.1 05/29/2025    CHLORIDE 108 (H) 05/29/2025    CO2 22 05/29/2025    BUN 29.2 (H) 05/29/2025    CR 3.74 (H) 05/29/2025    GLC 94 05/29/2025    AST 47 (H) 03/24/2025    ALT 42 03/24/2025    ALKPHOS 224 (H) 03/24/2025    BILITOTAL 0.3 03/24/2025    INR 1.00 04/30/2025        Physical Exam:  Vitals: There were no vitals taken for this visit.  SKIN: Total skin  excluding the undergarment areas was performed. The exam included the head/face, neck, both arms, chest, back, abdomen, both legs, digits and/or nails.   - Scattered benign appearing nevi.  Few waxy stuck on papules, keratoses.  Face and upper chest with areas of sebaceous hyperplasia    - No other lesions of concern on areas examined.       Medications:  Current Outpatient Medications   Medication Sig Dispense Refill    budesonide (PULMICORT) 1 MG/2ML neb solution Take 1 mg by nebulization daily.      buPROPion (WELLBUTRIN SR) 100 MG 12 hr tablet Take 150 mg by mouth      FLUoxetine (PROZAC) 20 MG capsule TAKE 1 CAPSULE(20 MG) BY MOUTH EVERY MORNING      levETIRAcetam (KEPPRA) 500 MG tablet Take 500 mg by mouth every 12 hours      mycophenolate (GENERIC EQUIVALENT) 250 MG capsule Take 2 capsules by mouth every 12 hours      predniSONE (DELTASONE) 5 MG tablet Take 10 mg by mouth daily      sodium bicarbonate 650 MG tablet Take 1 tablet (650 mg) by mouth 2 times daily. 180 tablet 3    Vitamin D3 (CHOLECALCIFEROL) 25 mcg (1000 units) tablet Take 1 tablet (25 mcg) by mouth daily. 90 tablet 3    empagliflozin (JARDIANCE) 10 MG TABS tablet Take 1 tablet (10 mg) by mouth daily. (Patient not taking: Reported on 6/17/2025) 90 tablet 3     Current Facility-Administered Medications   Medication Dose Route Frequency Provider Last Rate Last Admin    amoxicillin-clavulanate (AUGMENTIN) 875-125 MG 1 tablet for allergy testing   Other Once Brad Berry MD        ampicillin (OMNIPEN) 250 mg for allergy testing   Other Once Brad Berry MD        erythromycin (ERYTHROCIN) 500 mg for allergy testing   Other Once Brad Berry MD        penicillin G potassium 500,000 Units for allergy testing   Other Once Brad Berry MD          Past Medical History:   Patient Active Problem List   Diagnosis    History of liver transplant (H)    Immunosuppression    Renal cyst    Hypocalcemia    Hyperparathyroidism due to renal  insufficiency    Acidosis    Anemia    Acquired absence of spleen    Adjustment disorder with mixed emotional features    Emanuel's esophagus with high grade dysplasia    Benign neoplasm of stomach    Cellulitis of lower extremity    Brain aneurysm    Complication of transplanted liver (H)    Congenital biliary atresia (H)    Diaphragmatic hernia    Diarrhea    Disease due to severe acute respiratory syndrome coronavirus 2 (SARS-CoV-2)    Dysphagia    Enterocolitis    Colitis due to Clostridium difficile    Eosinophilic esophagitis    Gastritis, erosive    Gastroesophageal reflux disease with esophagitis    Gastroesophageal reflux disease without esophagitis    Generalized anxiety disorder    History of severe acute respiratory syndrome coronavirus 2 (SARS-CoV-2) disease    Hyperammonemia    Hyperlipidemia    Hypertension    Immunocompromised due to corticosteroids    Immunodeficiency    Intracranial subarachnoid hemorrhage (H)    Leukocytosis    Long term (current) use of systemic steroids    Recurrent major depressive disorder, in remission    Moderate protein-calorie malnutrition    Multiple acquired cysts of kidney    Osteopenia    Polyp of duodenum    Proteinuria    Reactive thrombocytosis    Relapsing fever    Seizure (H)    CKD (chronic kidney disease) stage 4, GFR 15-29 ml/min (H)    Tick-borne relapsing fever    Vomiting    Emanuel esophagus    Cirrhosis of liver (H)    Campylobacter gastroenteritis    Epileptic grand mal status (H)    Underweight       Past Medical History:   Diagnosis Date    Anaplasmosis     Emanuel esophagus     Biliary atresia (H)     Brain aneurysm 02/2013    s/p clipping    Campylobacter gastroenteritis 11/2024    Cirrhosis of liver (H)     CKD (chronic kidney disease) stage 4, GFR 15-29 ml/min (H)     Colitis due to Clostridium difficile     Eosinophilic esophagitis     Epileptic grand mal status (H) 2019    Osteopenia     Renal cyst bilateral     Underweight        CC: Primary Care  Provider: Rip Lora or Referring Provider: Mily Boudreaux

## 2025-06-16 NOTE — LETTER
2025    Matthew Arias  [address] [facility] [address]      Dear Mr. Arias,    This letter is sent to confirm that you are a candidate in the kidney transplant program at the [facility].  You were placed on the kidney inactive waitlist on 2025.  This means you will accumulate waiting time but not receive  donor calls.  You have been placed on INACTIVE status due to an incomplete evaluation, please see my separate letter  2025 regarding the details of this.     Per our routine Office workflow, you will now be transferred to the kidney waitlist transplant coordinator team. Your new coordinator is Briana GONSALEZ assisted by Maricarmen CARR. Either can be reached by calling our Main Office Number in the upper right hand of this letter.     Items we will need from you:    We will receive approval from your insurance company for the transplant procedure when your status is ready to be changed to ACTIVE.  It is critical that you notify us if there is any change in your insurance.  It is also important that you familiarize yourself with the details of your specific insurance policy.  Our patient  is available to assist you if you should have any questions regarding your coverage.    An ALA or PRA blood sample needs to be sent here every 3  months to match you with  donors or any potential living donors. Your next sample is due now.   You can have this drawn at any [facility] by simply making a lab appointment. In the event you are using an outside lab, such as [facility] or dialysis some day, special mailing boxes (called mailers) will be sent to you directly from the Outreach Department.  You should take the physician order form and the  to your home laboratory when it is time to for this testing to be done.  Additional mailers can be obtained by calling the Transplant Office and asking to speak to a  kidney .    During this waiting period, we may request additional periodic laboratory tests with your primary physician.  It will be your responsibility to remind your physician to forward your results to the Transplant Office.    We need to be kept informed of any changes in your medical condition such as:    changes in your medications,   significant changes in your health  significant infections (such as pneumonia or abscesses)  blood transfusions  any condition which requires hospitalization  any surgery    Remember to complete any routine cancer screening tests required before your transplant.  This includes colonoscopy; prostrate screening for men, and mammogram and gynecologic testing for women, as well as dental work.  Your primary care clinic can assist you with arranging for these exams.  Remind your caregivers to forward copies of the records and final reports.    We want you to know that the [facility] Transplant Program has physician and surgeon coverage 24 hours a day, 365 days a year, and is readily available to facilitate organ acceptance, procurement, and transplantation in a timely manner. Our additional transplant surgeons and physicians are credentialed by the hospital to provide transplant services and can independently manage the care of transplant patients. Our additional transplant surgeons are also able to independently perform transplant operations and organ procurement procedures. Additionally, our transplant surgeons and physicians will not be on call for two or more transplant programs more than 30 miles apart unless the circumstances have been reviewed and approved by the [employer] Organ [employer]aring (UNOS) Membership and Professional Standards Committee (MPSC). Finally, our primary physicians and primary surgeons are not designated as the primary surgeon or primary physician at more than 1 transplant hospital. If this coverage changes or there are  substantial program changes, you will be notified in writing by letter.    Attached is a letter from UN that describes the services and information offered to patients by UN and the Organ Procurement and Transplantation Network (OPTN).    We appreciate having had the opportunity to participate in your care.  If you have questions, please feel free to call the Transplant Office at [phone] or [phone].      Sincerely,   Do     Kidney Transplant Program    Enclosures: [facility] Letter  CC:  Dr. Leighann Carmona, Dr. Rip Lora, Dr. Mati Wolf, Dee Dee BAILEY                          The Organ Procurement and Transplantation Network Toll-free patient services line:  [phone]  Your resource for organ transplant information    Staffed 8:30 am - 5:00 pm ET Monday - Friday Leave a message 24/7 to receive a call back    The Organ Procurement and Transplantation Network (OPTN) is the national transplant system. It makes the policies that decide how donated organs are matched to patients waiting for a transplant. The OPTN:    Makes sure donated organs get matched to people on the transplant waiting list  Tells people about the donation and transplant processes  Makes sure that the public knows about the need for more organ and tissue donations    The OPTN has a free patient services line that you can call to:  Get more information about:  Organ donation and organ transplants  Donation and transplant policies  Get an information kit with:  A list of transplant hospitals  Waiting list information  Talk about any questions you may have about your transplant hospital or organ procurement organization. The staff will do their best to help you or point you to others who may help.  Find out how you can volunteer with the OPTN and help shape transplant policy     The patient services line number is: 5-013-358-4622    Patient services line staff CANNOT answer questions about your own medical care,  including:  Waiting list status  Test results  Medical records  You will need to call your transplant hospital for this information.    The following websites have more information about transplantation and donation:    OPTN: https://optn.transplant.hrsa.gov/    For potential living donors and transplant recipients:    Living with transplant: https://www.transplantliving.org/    Living donation process: https://optn.transplant.hrsa.gov/living-donation/    Financial assistance: https://www.livingdonorassistance.org/    Transplantation data: https://www.srtr.org/    Organ donation: https://www.organdonor.gov/    Volunteer with the OPTN: https://optn.transplant.hrsa.gov/get-involved/

## 2025-06-17 ENCOUNTER — OFFICE VISIT (OUTPATIENT)
Dept: DERMATOLOGY | Facility: CLINIC | Age: 40
End: 2025-06-17
Attending: NURSE PRACTITIONER
Payer: COMMERCIAL

## 2025-06-17 DIAGNOSIS — Z90.81 ACQUIRED ABSENCE OF SPLEEN: ICD-10-CM

## 2025-06-17 DIAGNOSIS — Z76.82 ORGAN TRANSPLANT CANDIDATE: ICD-10-CM

## 2025-06-17 DIAGNOSIS — A04.5 CAMPYLOBACTER GASTROENTERITIS: ICD-10-CM

## 2025-06-17 DIAGNOSIS — Z01.810 PRE-OPERATIVE CARDIOVASCULAR EXAMINATION: ICD-10-CM

## 2025-06-17 DIAGNOSIS — N18.4 STAGE 4 CHRONIC KIDNEY DISEASE (H): ICD-10-CM

## 2025-06-17 DIAGNOSIS — N28.1 RENAL CYST: ICD-10-CM

## 2025-06-17 DIAGNOSIS — Z01.818 PRE-TRANSPLANT EVALUATION FOR KIDNEY TRANSPLANT: ICD-10-CM

## 2025-06-17 DIAGNOSIS — K20.0 EOSINOPHILIC ESOPHAGITIS: ICD-10-CM

## 2025-06-17 DIAGNOSIS — N18.9 CHRONIC KIDNEY DISEASE (CKD): ICD-10-CM

## 2025-06-17 DIAGNOSIS — Z94.4 HISTORY OF LIVER TRANSPLANT (H): ICD-10-CM

## 2025-06-17 ASSESSMENT — PAIN SCALES - GENERAL: PAINLEVEL_OUTOF10: NO PAIN (0)

## 2025-06-17 NOTE — LETTER
6/17/2025      Matthew Arias  [address]  [facility] [address]      Dear Colleague,    Thank you for referring your patient, Matthew Arias, to the [facility]. Please see a copy of my visit note below.    [facility] Dermatology Note  Encounter Date: Jun 17, 2025  Office Visit     Dermatology Problem List:  1.  Transplant patient, first transplant >15 years ago    ____________________________________________    Assessment & Plan:     # History of Transplant  # History of Liver transplant x 2  - Annual TBSE, sooner for spot checks/areas of concern.  - Reviewed the risk of immunosuppression, and increased risk of NMSC  - Continue sun protection and sun avoidance.   - SPF >30 daily, especially face and photo exposed areas.    Awaiting listing for kidney transplant.    # Actinic skin damage  # No personal history of nonmelanoma skin cancer.  - Reviewed photo protection and photo avoidance.  There is no safe tan.  - Continue SPF daily and Q2-3 hours with sun exposure.  - Encouraged sun avoidance and physical barriers.       Procedures Performed:   None  None    Follow-up: 1 year(s) in-person, or earlier for new or changing lesions    Staff:     Angelika Álvarez MD PhD  Dermatology, Dermatopathology    ____________________________________________    CC: Skin Check      HPI:  Matthew Arias is a(n) 39 year old male who presents today as a new patient for skin check.  Denies any areas of concern.  Notes that he has had a mole on his right shin for many many years, not changing not growing.    Patient is otherwise feeling well, without additional skin concerns.     Labs Reviewed:    Lab Results   Component Value Date    WBC 9.3 04/30/2025    HGB 12.2 (L) 05/29/2025    HCT 39.9 (L) 04/30/2025     04/30/2025     05/29/2025    POTASSIUM 4.1 05/29/2025    CHLORIDE 108 (H) 05/29/2025    CO2 22 05/29/2025    BUN 29.2 (H) 05/29/2025    CR 3.74 (H) 05/29/2025     GLC 94 05/29/2025    AST 47 (H) 03/24/2025    ALT 42 03/24/2025    ALKPHOS 224 (H) 03/24/2025    BILITOTAL 0.3 03/24/2025    INR 1.00 04/30/2025        Physical Exam:  Vitals: There were no vitals taken for this visit.  SKIN: Total skin excluding the undergarment areas was performed. The exam included the head/face, neck, both arms, chest, back, abdomen, both legs, digits and/or nails.   - Scattered benign appearing nevi.  Few waxy stuck on papules, keratoses.  Face and upper chest with areas of sebaceous hyperplasia    - No other lesions of concern on areas examined.       Medications:  Current Outpatient Medications   Medication Sig Dispense Refill     budesonide (PULMICORT) 1 MG/2ML neb solution Take 1 mg by nebulization daily.       buPROPion (WELLBUTRIN SR) 100 MG 12 hr tablet Take 150 mg by mouth       FLUoxetine (PROZAC) 20 MG capsule TAKE 1 CAPSULE(20 MG) BY MOUTH EVERY MORNING       levETIRAcetam (KEPPRA) 500 MG tablet Take 500 mg by mouth every 12 hours       mycophenolate (GENERIC EQUIVALENT) 250 MG capsule Take 2 capsules by mouth every 12 hours       predniSONE (DELTASONE) 5 MG tablet Take 10 mg by mouth daily       sodium bicarbonate 650 MG tablet Take 1 tablet (650 mg) by mouth 2 times daily. 180 tablet 3     Vitamin D3 (CHOLECALCIFEROL) 25 mcg (1000 units) tablet Take 1 tablet (25 mcg) by mouth daily. 90 tablet 3     empagliflozin (JARDIANCE) 10 MG TABS tablet Take 1 tablet (10 mg) by mouth daily. (Patient not taking: Reported on 6/17/2025) 90 tablet 3     Current Facility-Administered Medications   Medication Dose Route Frequency Provider Last Rate Last Admin     amoxicillin-clavulanate (AUGMENTIN) 875-125 MG 1 tablet for allergy testing   Other Once Brad Berry MD         ampicillin (OMNIPEN) 250 mg for allergy testing   Other Once Brad Berry MD         erythromycin (ERYTHROCIN) 500 mg for allergy testing   Other Once Brad Berry MD         penicillin G potassium 500,000  Units for allergy testing   Other Once Brad Berry MD          Past Medical History:   Patient Active Problem List   Diagnosis     History of liver transplant (H)     Immunosuppression     Renal cyst     Hypocalcemia     Hyperparathyroidism due to renal insufficiency     Acidosis     Anemia     Acquired absence of spleen     Adjustment disorder with mixed emotional features     Emanuel's esophagus with high grade dysplasia     Benign neoplasm of stomach     Cellulitis of lower extremity     Brain aneurysm     Complication of transplanted liver (H)     Congenital biliary atresia (H)     Diaphragmatic hernia     Diarrhea     Disease due to severe acute respiratory syndrome coronavirus 2 (SARS-CoV-2)     Dysphagia     Enterocolitis     Colitis due to Clostridium difficile     Eosinophilic esophagitis     Gastritis, erosive     Gastroesophageal reflux disease with esophagitis     Gastroesophageal reflux disease without esophagitis     Generalized anxiety disorder     History of severe acute respiratory syndrome coronavirus 2 (SARS-CoV-2) disease     Hyperammonemia     Hyperlipidemia     Hypertension     Immunocompromised due to corticosteroids     Immunodeficiency     Intracranial subarachnoid hemorrhage (H)     Leukocytosis     Long term (current) use of systemic steroids     Recurrent major depressive disorder, in remission     Moderate protein-calorie malnutrition     Multiple acquired cysts of kidney     Osteopenia     Polyp of duodenum     Proteinuria     Reactive thrombocytosis     Relapsing fever     Seizure (H)     CKD (chronic kidney disease) stage 4, GFR 15-29 ml/min (H)     Tick-borne relapsing fever     Vomiting     Emanuel esophagus     Cirrhosis of liver (H)     Campylobacter gastroenteritis     Epileptic grand mal status (H)     Underweight       Past Medical History:   Diagnosis Date     Anaplasmosis      Emanuel esophagus      Biliary atresia (H)      Brain aneurysm 02/2013    s/p clipping      Campylobacter gastroenteritis 11/2024     Cirrhosis of liver (H)      CKD (chronic kidney disease) stage 4, GFR 15-29 ml/min (H)      Colitis due to Clostridium difficile      Eosinophilic esophagitis      Epileptic grand mal status (H) 2019     Osteopenia      Renal cyst bilateral      Underweight        CC: Primary Care Provider: Rip Lora or Referring Provider: Mily Boudreaux     Again, thank you for allowing me to participate in the care of your patient.        Sincerely,        Angelika Álvarez MD    Electronically signed

## 2025-06-19 ENCOUNTER — LAB (OUTPATIENT)
Dept: LAB | Facility: CLINIC | Age: 40
End: 2025-06-19
Payer: COMMERCIAL

## 2025-06-19 DIAGNOSIS — Z76.82 AWAITING ORGAN TRANSPLANT: ICD-10-CM

## 2025-06-19 PROCEDURE — 36415 COLL VENOUS BLD VENIPUNCTURE: CPT

## 2025-06-24 ENCOUNTER — OFFICE VISIT (OUTPATIENT)
Dept: ALLERGY | Facility: CLINIC | Age: 40
End: 2025-06-24
Payer: COMMERCIAL

## 2025-06-24 ENCOUNTER — ALLIED HEALTH/NURSE VISIT (OUTPATIENT)
Dept: INFECTIOUS DISEASES | Facility: CLINIC | Age: 40
End: 2025-06-24
Payer: COMMERCIAL

## 2025-06-24 DIAGNOSIS — L50.9 URTICARIA: ICD-10-CM

## 2025-06-24 DIAGNOSIS — A04.5 CAMPYLOBACTER GASTROENTERITIS: ICD-10-CM

## 2025-06-24 DIAGNOSIS — J30.89 SEASONAL AND PERENNIAL ALLERGIC RHINOCONJUNCTIVITIS: ICD-10-CM

## 2025-06-24 DIAGNOSIS — H10.10 SEASONAL AND PERENNIAL ALLERGIC RHINOCONJUNCTIVITIS: ICD-10-CM

## 2025-06-24 DIAGNOSIS — Z01.818 PRE-TRANSPLANT EVALUATION FOR KIDNEY TRANSPLANT: Primary | ICD-10-CM

## 2025-06-24 DIAGNOSIS — K20.0 EOSINOPHILIC ESOPHAGITIS: ICD-10-CM

## 2025-06-24 DIAGNOSIS — Z23 NEED FOR VACCINATION: Primary | ICD-10-CM

## 2025-06-24 DIAGNOSIS — Z91.048 ALLERGY TO MOLD: ICD-10-CM

## 2025-06-24 DIAGNOSIS — Z88.9 MULTIPLE DRUG ALLERGIES: ICD-10-CM

## 2025-06-24 DIAGNOSIS — J30.2 SEASONAL AND PERENNIAL ALLERGIC RHINOCONJUNCTIVITIS: ICD-10-CM

## 2025-06-24 PROCEDURE — 90636 HEP A/HEP B VACC ADULT IM: CPT | Performed by: INTERNAL MEDICINE

## 2025-06-24 PROCEDURE — 250N000011 HC RX IP 250 OP 636: Performed by: INTERNAL MEDICINE

## 2025-06-24 PROCEDURE — 90471 IMMUNIZATION ADMIN: CPT | Performed by: INTERNAL MEDICINE

## 2025-06-24 PROCEDURE — 90648 HIB PRP-T VACCINE 4 DOSE IM: CPT | Performed by: INTERNAL MEDICINE

## 2025-06-24 PROCEDURE — 90472 IMMUNIZATION ADMIN EACH ADD: CPT | Performed by: INTERNAL MEDICINE

## 2025-06-24 RX ADMIN — HEPATITIS A AND HEPATITIS B (RECOMBINANT) VACCINE 1 ML: 720; 20 INJECTION, SUSPENSION INTRAMUSCULAR at 11:57

## 2025-06-24 RX ADMIN — HAEMOPHILUS B POLYSACCHARIDE CONJUGATE VACCINE FOR INJ 0.5 ML: RECON SOLN at 12:01

## 2025-06-24 NOTE — LETTER
6/24/2025      Matthew Arias  [address]  [facility] [address]      Dear Colleague,    Thank you for referring your patient, Matthew Arias, to the [facility] [place]. Please see a copy of my visit note below.    [facility] Dermato-allergology Note  Office visit  Encounter Date: June 24, 2025  ____________________________________________    CC: Allergy Testing Followup (Drug testing, wondering about testing for other things like dairy. Currently on maintenance dose MMF and prednisone 10mg. )       HPI:  (June 24, 2025)  Mr. Matthew Arias is a(n) 39 year old male who presents today for follow-up  for allergy consultation  - Follow-up in Derm-Allergy clinic for allergy tests as planned   - Instant upset stomach, diarrhea with dairy products  - Had testing a while ago, supposedly allergic  - Otherwise feeling well in usual state of health    Physical Exam:  General: In no acute distress, well-developed, well-nourished  Eyes: no conjunctivitis  ENT: no signs of rhinitis   Pulmonary: no wheezing or coughing  Skin:Focused examination of the skin on test sites was performed = see test results below    Earlier History and Allergy Exams:  (May 13, 2025) ----> (6/4/25)?  Presents today as new patient for allergy consultation  - Referred by Mily Boudreaux NP on 4/28/2025 for eosinophilia, patient is a kidney transplant candidate.   - 3/26/2025 OV visit with Dr. 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 He underwent a retransplant in 2001 because of cirrhosis.    Matthew has had good allograft function. His liver enzymes have been increased over this past year. He has been maintained on CellCept and prednisone. He had been taken off tacrolimus 20 years ago because of CKD. He has chronic kidney disease with a GFR around 30. This got  temporarily worse when he had Campylobacter in November. It is back to his baseline. He did talk to the [facility] kidney transplant program for evaluation and is waiting to hear back with the results of that evaluation, which was just done last week.    Matthew also has systemic eosinophilia. He has eosinophilic esophagitis as well. Follows up with gastroenterology locally in [place]. He is on a PPI and sucralfate. I do not see that he is on budesonide. Denies any chest pain. No dysphagia. No shortness of breath. No GI or genitourinary complaints now. Past history of brain aneurysm status post clipping, most recent imaging without recurrence. Neurosurgery saw him recently and discharged him saying he does not need any further imaging from their perspective.     FROM A&P:  1. Mr. Matthew Arias is status post liver transplant in 1986 and 2001 for biliary atresia and cirrhosis. He has preserved liver function but increased liver enzymes. We did do a FibroScan today that showed a liver elastography score of 11.4 kPa, although there was some variation due to posttransplant status and scarring. This is consistent with possible stage III fibrosis or inflammation and lesser fibrosis. CAP score was normal at 163, consistent with absence of steatosis. Given the elevated liver enzymes and this FibroScan result concerning for significant fibrosis, I do recommend an ultrasound-guided liver biopsy. I would like him to get this at the [facility] for his convenience, and the slides can be sent to us for over-read and review.  2. He should follow up with his local gastroenterologist for eosinophilic esophagitis. I will defer to him if any change in therapy is needed such as Dupixent or budesonide.  3. Given his systemic eosinophilia and inability to gain weight, I recommend that he see an allergist and immunologist locally. He can be referred to the [facility] for this, and I would  appreciate it if Dr. Lora would send him to the clinic.  4. He should get a bone densitometry to screen for osteoporosis. Vitamin D level is low. I recommend he take vitamin D 2000 units daily and recheck labs in 3 months. Regarding health care maintenance, he tells me he is up to date with the flu shot and the COVID booster. He should get yearly dermatology, skin cancer screening exams, eye exam, and dental evaluations. I will plan to see him back in clinic in the next 6 months.   - 2017 got C Diff 4x   - Hx of eosinophilic esophagitis, following with GI  - Taking budesonide, feels better but not 100%    Past Medical History:   Patient Active Problem List   Diagnosis     History of liver transplant (H)     Immunosuppression     Renal cyst     Hypocalcemia     Hyperparathyroidism due to renal insufficiency     Acidosis     Anemia     Acquired absence of spleen     Adjustment disorder with mixed emotional features     Emanuel's esophagus with high grade dysplasia     Benign neoplasm of stomach     Cellulitis of lower extremity     Brain aneurysm     Complication of transplanted liver (H)     Congenital biliary atresia (H)     Diaphragmatic hernia     Diarrhea     Disease due to severe acute respiratory syndrome coronavirus 2 (SARS-CoV-2)     Dysphagia     Enterocolitis     Colitis due to Clostridium difficile     Eosinophilic esophagitis     Gastritis, erosive     Gastroesophageal reflux disease with esophagitis     Gastroesophageal reflux disease without esophagitis     Generalized anxiety disorder     History of severe acute respiratory syndrome coronavirus 2 (SARS-CoV-2) disease     Hyperammonemia     Hyperlipidemia     Hypertension     Immunocompromised due to corticosteroids     Immunodeficiency     Intracranial subarachnoid hemorrhage (H)     Leukocytosis     Long term (current) use of systemic steroids     Recurrent major depressive disorder, in remission     Moderate protein-calorie malnutrition      Multiple acquired cysts of kidney     Osteopenia     Polyp of duodenum     Proteinuria     Reactive thrombocytosis     Relapsing fever     Seizure (H)     CKD (chronic kidney disease) stage 4, GFR 15-29 ml/min (H)     Tick-borne relapsing fever     Vomiting     Emanuel esophagus     Cirrhosis of liver (H)     Campylobacter gastroenteritis     Epileptic grand mal status (H)     Underweight     Past Medical History:   Diagnosis Date     Anaplasmosis      Emanuel esophagus      Biliary atresia (H)      Brain aneurysm 02/2013    s/p clipping     Campylobacter gastroenteritis 11/2024     Cirrhosis of liver (H)      CKD (chronic kidney disease) stage 4, GFR 15-29 ml/min (H)      Colitis due to Clostridium difficile      Eosinophilic esophagitis      Epileptic grand mal status (H) 2019     Osteopenia      Renal cyst bilateral      Underweight        Allergies:  Allergies   Allergen Reactions     Levofloxacin      Other Reaction(s): Renal Failure    Hx of renal failure     Amoxicillin-Pot Clavulanate Rash     Patient reported this was a couple years ago and he did not need treatment for the rash. Just stopped taking the medication.     Erythromycin      Other Reaction(s): GI UPSET, GI Upset     Penicillins Rash       Medications:  Current Outpatient Medications   Medication Sig Dispense Refill     budesonide (PULMICORT) 1 MG/2ML neb solution Take 1 mg by nebulization daily.       buPROPion (WELLBUTRIN SR) 100 MG 12 hr tablet Take 150 mg by mouth       empagliflozin (JARDIANCE) 10 MG TABS tablet Take 1 tablet (10 mg) by mouth daily. (Patient not taking: Reported on 6/17/2025) 90 tablet 3     FLUoxetine (PROZAC) 20 MG capsule TAKE 1 CAPSULE(20 MG) BY MOUTH EVERY MORNING       levETIRAcetam (KEPPRA) 500 MG tablet Take 500 mg by mouth every 12 hours       mycophenolate (GENERIC EQUIVALENT) 250 MG capsule Take 2 capsules by mouth every 12 hours       predniSONE (DELTASONE) 5 MG tablet Take 10 mg by mouth daily       sodium  bicarbonate 650 MG tablet Take 1 tablet (650 mg) by mouth 2 times daily. 180 tablet 3     Vitamin D3 (CHOLECALCIFEROL) 25 mcg (1000 units) tablet Take 1 tablet (25 mcg) by mouth daily. 90 tablet 3     Current Facility-Administered Medications   Medication Dose Route Frequency Provider Last Rate Last Admin     amoxicillin-clavulanate (AUGMENTIN) 875-125 MG 1 tablet for allergy testing   Other Once Brad Berry MD         ampicillin (OMNIPEN) 250 mg for allergy testing   Other Once Brad Berry MD         erythromycin (ERYTHROCIN) 500 mg for allergy testing   Other Once Brad Berry MD         penicillin G potassium 500,000 Units for allergy testing   Other Once Brad Berry MD           Previous Labs, Allergy Tests, Dermatopathology, Imaging:  N/a    Referred By: Mily Boudreaux NP  [address] [place] [id number]     Allergy Tests:  Past Allergy Test    Family History:  Family History   Problem Relation Age of Onset     No Known Problems Mother      No Known Problems Father      No Known Problems Sister      Kidney Disease Paternal Grandfather        Social History:  The patient n/a.   Patient has the following hobbies or non-occupational exposure: n/a.    Order for Future Allergy Testing:    [x] Outpatient  [] Inpatient: [facility]..../ Bed ...    Skin Atopy (atopic dermatitis)? [] Yes     [] No  Comments:   Childhood eczema?   [] Yes     [] No  Comments:   Hand eczema?   [] Yes     [] No  If yes, leading hand? [] Right     [] Left     [] Ambidextrous  Comments:     Contact allergies?     Comments:   Including adhesives/bandages? [] Yes     [x] No  Comments:   Including metals?   [] Yes     [x] No  Comments:   Other substances?   [] Yes     [x] No  Comments:     Drug allergies?   [x] Yes     [] No  Comments:   - Levofloxacin: doesn't remember this one, might be hard on your kidneys  - Augmentin: developed a rash a couple years ago, inside of one leg, lasted for 3-4 days. No tongue  swelling, difficulty breathing. Just switched the antibiotic, did not get any other treatments for it.  - Cefprozil: GI upset; no rashes/swelling; last took > 10 years ago  - Erythromycin: ?maybe a rash; 8-10 years ago  - Sulfa antibiotics: had pink eye, made his eyes worse. As a kid.   - took it orally for strep and did fine 2 years ago  - Macrolides: Unsure what this reaction was  - Penicillin: Unclear what the reaction was     Angioedema?   [] Yes     [x] No  Comments:     Urticaria?   [x] Yes     [] No  Comments:     Food allergies?   [x] Yes ?    [] No  Comments:   - is supposed to be receiving some testing for food allergies?  - has EoE    Pet allergies?   [x] Yes     [] No  Comments:   - some borderline reaction to cat      Environmental allergy symptoms?  [x] Conjunctivitis  [] Otitis  [] Pharyngitis  [] Polyposis  [] Postnasal Drip  [x] Rhinitis  [] Sinusitis  [] None  Comments:   - Bee pollen: seasonal allergies, takes antihistamines once in a while; does not remember allergy testing, maybe when he was a kid  - when grass is cut notices symptoms the most     HENT Operations?  [] Adenoids [] Septum [] Sinus  [] Tonsils        [] Other:   [] None  Comments:     Pulmonary symptoms (from birth to present)?  [] Asthma bronchiale  Inhaler(s)?:   [] Coughing  [] Other  [x] None  Comments:     Environmental and pulmonary symptoms aggravated by?  Season: [] None     [] I     [] II     [] III     [] IV     [x] V     [x] VI          [] VII     [] VIII     [] IX     [] X     [] XI     [] XII     [] Perennial  Time of Day: [] None     [] Morning     [] Noon     [] Evening     [] Night     [] Whole Day  Location/Changes: [] None     [] Inside     [] Outside     [] Mountain     [] Sea     [] Other:   Triggers (specific): [] None     [] Animals     [] Dust     [] Mold     [] Plants     [] Other:   Triggers (other): [] None     [] Psyche     [] Sport     [] Work     [] Other:   Irritant: [] None     [] Cold     [] Heat      [] Odors     [] Physical Efforts     [] Smoke     [] Other:     Order for PATCH TESTS  Reason for tests (suspected allergy): n/a  Known previous allergies: n/a  Standardized panels  [] Standard panel (40 tests)  [] Preservatives & Antimicrobials (31 tests)  [] Emulsifiers & Additives (25 tests)   [] Perfumes/Flavours & Plants (25 tests)  [] Hairdresser panel (12 tests)  [] Rubber Chemicals (22 tests)  [] Plastics (26 tests)  [] Colorants/Dyes/Food additives (20 tests)  [] Metals (implants/dental) (24 tests)  [] Local anaesthetics/NSAIDs (13 tests)  [] Antibiotics & Antimycotics (14 tests)   [] Corticosteroids (15 tests)   [] Photopatch test (62 tests)   [] Others:     [] Patient's Own Products:   DO NOT test if chemical or biological identity is unknown!   always ask from patient the product information and safety sheets (MSDS)       Order for PRICK TESTS    Reason for tests (suspected allergy): atopy?  Known previous allergies: prior     Standardized prick panels  [x] Atopic panel (20 tests)  [] Pediatric Panel (12 tests)  [] Milk, Meat, Eggs, Grains (20 tests)   [] Dust, Epithelia, Feathers (10 tests)  [] Fish, Seafood, Shellfish (17 tests)  [] Nuts, Beans (14 tests)  [] Spice, Vegetable, Fruit (17 tests)  [] Pollen Panel = Tree, Grass, Weed (24 tests)  [] Others:     [] Patient's Own Products:   DO NOT test if chemical or biological identity is unknown!   always ask from patient the product information and safety sheets (MSDS)     Standardized intradermal tests  [] Alternaria alternata  [] Aspergillus fumigatus  [] Cladosporium herbarum   [] Penicillium notatum  [] Dermatophagoides farinae  [] Dermatophagoides pteronyssinus  [] Dog Epithelium  [] Cat Epithelium  [] Others:     [] Bee venom   [] Wasp venom  !!Specific protocol with dilutions!!       Order for Drug allergy tests (prick & intradermal & patch tests)    [x] Penicillin G     [x] Ampicillin   [] Cefazolin (1st gen)     [] Cefuroxime (2nd gen)     []  Ceftriaxone (3rd gen)     [] Ceftazidime (3rd gen)     [] Cefepime (4th gen)       [] Bactrim  [] Iodixanol (Visipaque)     [] Iopamidol (Isovue)       [] Iohexol (Omnipaque)  [x] Others: Augmentin, Erythromycin  [] Patient's Own:   Order for ... as test date      Atopy Screen (Placed June 24, 2025)  6/24/25: Patient currently on nitrofurantoin and prednisone   No Substance Readings (15 min) Evaluation   POS Histamine 1mg/ml +    NEG NaCl 0.9% -      No Substance Readings (15 min) Evaluation   1 Alternaria alternata (tenuis)  +/++    2 Cladosporium herbarum -    3 Aspergillus fumigatus -    4 Penicillium notatum -    5 Dermatophagoides pteronyssinus -    6 Dermatophagoides farinae -    7 Dog epithelium (canis spp) -    8 Cat hair (marita catus) -    9 Cockroach   (Blatella americana & germanica) -    10 Grass mix midwest   (Blanka, Orchard, Redtop, Max) -    11 Etienne grass (sorghum halepense) -    12 Weed mix   (common Cocklebur, Lamb s quarters, rough redroot Pigweed, Dock/Sorrel) -    13 Mug wort (artemisia vulgare) -    14 Ragweed giant/short (ambrosia spp) -    15 White birch (Betula papyrifera) -    16 Tree mix 1 (Pecan, Maple BHR, Oak RVW, american [place], black Kirk) -    17 Red cedar (juniperus virginia) -    18 Tree mix 2   (white Norberto, river/red Birch, black [place], common Sylmar, american Elm) -    19 Box elder/Maple mix (acer spp) -    20 [place] shagbark (carya ovata) -           Conclusion    Milk(Placed Jun 24, 2025 )  No Substance Readings (15min) Evaluation   1 Cows milk whole (lillian arnaud) -    2 Casein (lillian arnaud) -      Conclusion:      DRUG ALLERGY TEST SERIES June 24, 2025      Prick Tests         Substance/ Allergen Conc Result (20 min) Remarks    Benzylpenicillin (Penicillin G) 1 Harlan IU/ml (600 mg) -     Ampicillin 100mg/ml -     Amoxicillin/Clavulanic acid 50 mg/ml -     Erythromycin 50 mg/ml -       Intradermal Tests   immed immed delay delay      Substance Conc 1st dil  2nd dil   2 days  4 days remarks    Benzylpenicillin (Penicillin G) 1:100 -        Ampicillin 1:10 -        Amoxicillin/Clavulanic acid 1:10 / 1:100 -        Erythromycin 1:1000/ 1:200 -           Patch Tests  as is as is 1:2 1:2     As Is  Vas Substance Conc 2 days 4 days 2 days 4 days remarks   1 2 Benzylpenicillin (Penicillin G) 1 Harlan IU/ml (600 mg)        3 4 Ampicillin 100 mg/ml        5 6 Amoxicillin/ Clavulanic acid 50 mg/ml        7 8 Erythromycin 50 mg/ml            ___________________________    Assessment & Plan:    ==> Final Diagnosis:     # Ruling out multiple drug reactions (pre-transplant)  Penicillin and amoxicillin unlikely allergic reaction (localized rash) ==> skin tests negatives  Sulfa antibiotics, unknown reaction in childhood (had later on with no problem, no allergy)  Erythromycin, unclear reaction ==> skin tests negatives  * chronic illness with exacerbation, progression, side effects from treatment    # Atopic predisposition with:   Seasonal RC and contact urticaria in summer and with grass  Positive prick test to alternaria   Proven eosinophilic esophagitis   Borderline RC to cat   * chronic illness with exacerbation, progression, side effects from treatment      These conclusions are made at the best of one's knowledge and belief based on the provided evidence such as patient's history and allergy test results and they can change over time or can be incomplete because of missing information.    ==> Treatment Plan:    >> For IDT/patch test sites from today, patient will monitor sites for the next 2 and 4 days. If there are any delayed reactions, patient will take photos with the ruler provided and send to clinic via [employer] message for review. Be sure to specify RIGHT or LEFT arm.  - Do not apply topical steroids to sites until after 4 total days.     >> For completed prick, intradermal, and patch tests, there were no signs for specific immediate or delayed type reactions to penicillins (penicillin  G, ampicillin, amoxicillin/clavulanic acid) and erythromycin.   ==> Can be used if necessary. For 1st dose, keep patient in clinic for observation for 30 minutes.       Procedures Performed: Allergy tests, including prick and drug allergy tests     Staff Involved: Provider, Staff, Resident, and Scribe    Scribe Disclosure:   I, Humaira Sidhu, am serving as a scribe to document services personally performed by Brad Berry MD based on data collection and the provider's statements to me.     Staff Physician Comments:  I was present with the scribe who participated in the documentation of the note. I have verified the history and personally performed the physical exam and medical decision making. I agree with the assessment and plan as documented in the note. I have reviewed and if necessary amended the note.      Brad Berry MD  Professor  Head of Dermato-Allergy Division  Department of Dermatology  [facility]      Follow-up in Derm-Allergy clinic for delayed readings  ___________________________    I spent a total of *** minutes with Matthew Arias during today s  visit. This time was spent discussing all the individual test results, correlating them to the clinical relevance, counseling the patient and/or coordinating care and performing allergy tests or procedures.        Again, thank you for allowing me to participate in the care of your patient.        Sincerely,        Brad Berry MD    Electronically signed

## 2025-06-24 NOTE — PROGRESS NOTES
Administrations This Visit       haemophilus B polysac-tetanus toxoid (ActHIB) injection 0.5 mL       Admin Date  06/24/2025 Action  $Given Dose  0.5 mL Route  Intramuscular Documented By  Dee Dee Bro MA              hepatitis A-hepatitis B (TWINRIX) injection 1 mL       Admin Date  06/24/2025 Action  $Given Dose  1 mL Route  Intramuscular Documented By  Dee Dee Bro MA Michelle Weems, MA on 6/24/2025 at 12:02 PM

## 2025-06-24 NOTE — NURSING NOTE
Chief Complaint   Patient presents with    Allergy Testing Followup     Drug testing, wondering about testing for other things like dairy. Currently on maintenance dose MMF and prednisone 10mg.      Leo Abraham RN

## 2025-06-24 NOTE — PATIENT INSTRUCTIONS
==> Treatment Plan:     >> For IDT/patch test sites from today, patient will monitor sites for the next 2 and 4 days. If there are any delayed reactions, patient will take photos with the ruler provided and send to clinic via swabr message for review. Be sure to specify RIGHT or LEFT arm.  - Do not apply topical steroids to sites until after 4 total days.      >> For completed prick, intradermal, and patch tests, there were no signs for specific immediate or delayed type reactions to penicillins (penicillin G, ampicillin, amoxicillin/clavulanic acid) and erythromycin.   ==> Can be used if necessary. For 1st dose, keep patient in clinic for observation for 30 minutes.     Removal of Patch Tests on Day 3:    Remove patches and tape from test area on Thursday, 6/26 at anytime          Using the purple surgical markers provided (or other permanent marker), draw a grid around the test area so that the circular indentation is in the center of each square, as below. Try to be as neat as possible and keep the lines as straight as you can (you can use a ruler if you need to)          Redraw the number that was underneath the tape above the grids, as shown below. Try to print clearly.            Photograph the entire area then take close-up photos of any possible reactions. Examples of possible reactions are spots that look like these: TAKE PHOTOS ON Thursday, 6/26 AND Saturday, 6/28, send them via swabr Message to Dr. Berry's team          Return to clinic for evaluation as instructed/Send photos via swabr message and team will reach out to you if we need to schedule a follow up. After removing on Thursday, 6/26, you should continue to avoid scrubbing the area, exposing the area to UV light, using topical steroids, or scratching.     REMINDER - THE PURPLE MARKER WILL STAIN CLOTHING, BEDDING, FURNITURE, ETC. WEAR A DARK COLORED SHIRT OR SOMETHING THAT YOU DON'T CARE IF IT GETS STAINED UP, UNTIL YOU CAN WASH IT FULLY  AFTER THE SECOND SET OF PHOTOS ARE DONE.        Who should I call with questions?  [facility], [place]: [phone]  For urgent needs outside of business hours call the [facility] at [phone] and ask for the dermatology resident on call      If you develop any serious or adverse allergic reaction after office hours please seek immediate medical assistance at the nearest clinic or emergency room

## 2025-06-24 NOTE — PROGRESS NOTES
[facility] Dermato-allergology Note  Office visit  Encounter Date: June 24, 2025  ____________________________________________    CC: Allergy Testing Followup (Drug testing, wondering about testing for other things like dairy. Currently on maintenance dose MMF and prednisone 10mg. )       HPI:  (June 24, 2025)  Mr. Matthew Arias is a(n) 39 year old male who presents today for follow-up  for allergy consultation  - Follow-up in Derm-Allergy clinic for allergy tests as planned   - Instant upset stomach, diarrhea with dairy products  - Had testing a while ago, supposedly allergic  - Otherwise feeling well in usual state of health    Physical Exam:  General: In no acute distress, well-developed, well-nourished  Eyes: no conjunctivitis  ENT: no signs of rhinitis   Pulmonary: no wheezing or coughing  Skin:Focused examination of the skin on test sites was performed = see test results below    Earlier History and Allergy Exams:  (May 13, 2025) ----> (6/4/25)?  Presents today as new patient for allergy consultation  - Referred by Mily Boudreaux NP on 4/28/2025 for eosinophilia, patient is a kidney transplant candidate.   - 3/26/2025 OV visit with Dr. 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 He underwent a retransplant in 2001 because of cirrhosis.    Matthew has had good allograft function. His liver enzymes have been increased over this past year. He has been maintained on CellCept and prednisone. He had been taken off tacrolimus 20 years ago because of CKD. He has chronic kidney disease with a GFR around 30. This got temporarily worse when he had Campylobacter in November. It is back to his baseline. He did talk to the [facility] kidney transplant program for evaluation and is waiting to hear back with the results of that evaluation, which was just  done last week.    Matthew also has systemic eosinophilia. He has eosinophilic esophagitis as well. Follows up with gastroenterology locally in [place]. He is on a PPI and sucralfate. I do not see that he is on budesonide. Denies any chest pain. No dysphagia. No shortness of breath. No GI or genitourinary complaints now. Past history of brain aneurysm status post clipping, most recent imaging without recurrence. Neurosurgery saw him recently and discharged him saying he does not need any further imaging from their perspective.     FROM A&P:  1. Mr. Matthew Arias is status post liver transplant in 1986 and 2001 for biliary atresia and cirrhosis. He has preserved liver function but increased liver enzymes. We did do a FibroScan today that showed a liver elastography score of 11.4 kPa, although there was some variation due to posttransplant status and scarring. This is consistent with possible stage III fibrosis or inflammation and lesser fibrosis. CAP score was normal at 163, consistent with absence of steatosis. Given the elevated liver enzymes and this FibroScan result concerning for significant fibrosis, I do recommend an ultrasound-guided liver biopsy. I would like him to get this at the [facility] for his convenience, and the slides can be sent to us for over-read and review.  2. He should follow up with his local gastroenterologist for eosinophilic esophagitis. I will defer to him if any change in therapy is needed such as Dupixent or budesonide.  3. Given his systemic eosinophilia and inability to gain weight, I recommend that he see an allergist and immunologist locally. He can be referred to the [facility] for this, and I would appreciate it if Dr. Lora would send him to the clinic.  4. He should get a bone densitometry to screen for osteoporosis. Vitamin D level is low. I recommend he take vitamin D 2000 units daily and recheck labs in 3 months. Regarding health care  maintenance, he tells me he is up to date with the flu shot and the COVID booster. He should get yearly dermatology, skin cancer screening exams, eye exam, and dental evaluations. I will plan to see him back in clinic in the next 6 months.   - 2017 got C Diff 4x   - Hx of eosinophilic esophagitis, following with GI  - Taking budesonide, feels better but not 100%    Past Medical History:   Patient Active Problem List   Diagnosis    History of liver transplant (H)    Immunosuppression    Renal cyst    Hypocalcemia    Hyperparathyroidism due to renal insufficiency    Acidosis    Anemia    Acquired absence of spleen    Adjustment disorder with mixed emotional features    Emanuel's esophagus with high grade dysplasia    Benign neoplasm of stomach    Cellulitis of lower extremity    Brain aneurysm    Complication of transplanted liver (H)    Congenital biliary atresia (H)    Diaphragmatic hernia    Diarrhea    Disease due to severe acute respiratory syndrome coronavirus 2 (SARS-CoV-2)    Dysphagia    Enterocolitis    Colitis due to Clostridium difficile    Eosinophilic esophagitis    Gastritis, erosive    Gastroesophageal reflux disease with esophagitis    Gastroesophageal reflux disease without esophagitis    Generalized anxiety disorder    History of severe acute respiratory syndrome coronavirus 2 (SARS-CoV-2) disease    Hyperammonemia    Hyperlipidemia    Hypertension    Immunocompromised due to corticosteroids    Immunodeficiency    Intracranial subarachnoid hemorrhage (H)    Leukocytosis    Long term (current) use of systemic steroids    Recurrent major depressive disorder, in remission    Moderate protein-calorie malnutrition    Multiple acquired cysts of kidney    Osteopenia    Polyp of duodenum    Proteinuria    Reactive thrombocytosis    Relapsing fever    Seizure (H)    CKD (chronic kidney disease) stage 4, GFR 15-29 ml/min (H)    Tick-borne relapsing fever    Vomiting    Emanuel esophagus    Cirrhosis of liver  (H)    Campylobacter gastroenteritis    Epileptic grand mal status (H)    Underweight     Past Medical History:   Diagnosis Date    Anaplasmosis     Emanuel esophagus     Biliary atresia (H)     Brain aneurysm 02/2013    s/p clipping    Campylobacter gastroenteritis 11/2024    Cirrhosis of liver (H)     CKD (chronic kidney disease) stage 4, GFR 15-29 ml/min (H)     Colitis due to Clostridium difficile     Eosinophilic esophagitis     Epileptic grand mal status (H) 2019    Osteopenia     Renal cyst bilateral     Underweight        Allergies:  Allergies   Allergen Reactions    Levofloxacin      Other Reaction(s): Renal Failure    Hx of renal failure    Amoxicillin-Pot Clavulanate Rash     Patient reported this was a couple years ago and he did not need treatment for the rash. Just stopped taking the medication.    Erythromycin      Other Reaction(s): GI UPSET, GI Upset    Penicillins Rash       Medications:  Current Outpatient Medications   Medication Sig Dispense Refill    budesonide (PULMICORT) 1 MG/2ML neb solution Take 1 mg by nebulization daily.      buPROPion (WELLBUTRIN SR) 100 MG 12 hr tablet Take 150 mg by mouth      empagliflozin (JARDIANCE) 10 MG TABS tablet Take 1 tablet (10 mg) by mouth daily. (Patient not taking: Reported on 6/17/2025) 90 tablet 3    FLUoxetine (PROZAC) 20 MG capsule TAKE 1 CAPSULE(20 MG) BY MOUTH EVERY MORNING      levETIRAcetam (KEPPRA) 500 MG tablet Take 500 mg by mouth every 12 hours      mycophenolate (GENERIC EQUIVALENT) 250 MG capsule Take 2 capsules by mouth every 12 hours      predniSONE (DELTASONE) 5 MG tablet Take 10 mg by mouth daily      sodium bicarbonate 650 MG tablet Take 1 tablet (650 mg) by mouth 2 times daily. 180 tablet 3    Vitamin D3 (CHOLECALCIFEROL) 25 mcg (1000 units) tablet Take 1 tablet (25 mcg) by mouth daily. 90 tablet 3     Current Facility-Administered Medications   Medication Dose Route Frequency Provider Last Rate Last Admin    amoxicillin-clavulanate  (AUGMENTIN) 875-125 MG 1 tablet for allergy testing   Other Once Brad Berry MD        ampicillin (OMNIPEN) 250 mg for allergy testing   Other Once Brad Berry MD        erythromycin (ERYTHROCIN) 500 mg for allergy testing   Other Once Brad Berry MD        penicillin G potassium 500,000 Units for allergy testing   Other Once Brad Berry MD           Previous Labs, Allergy Tests, Dermatopathology, Imaging:  N/a    Referred By: Mily Boudreaux, NP  [address] [place] [address]     Allergy Tests:  Past Allergy Test    Family History:  Family History   Problem Relation Age of Onset    No Known Problems Mother     No Known Problems Father     No Known Problems Sister     Kidney Disease Paternal Grandfather        Social History:  The patient n/a.   Patient has the following hobbies or non-occupational exposure: n/a.    Order for Future Allergy Testing:    [x] Outpatient  [] Inpatient: [facility]..../ Bed ...    Skin Atopy (atopic dermatitis)? [] Yes     [] No  Comments:   Childhood eczema?   [] Yes     [] No  Comments:   Hand eczema?   [] Yes     [] No  If yes, leading hand? [] Right     [] Left     [] Ambidextrous  Comments:     Contact allergies?     Comments:   Including adhesives/bandages? [] Yes     [x] No  Comments:   Including metals?   [] Yes     [x] No  Comments:   Other substances?   [] Yes     [x] No  Comments:     Drug allergies?   [x] Yes     [] No  Comments:   - Levofloxacin: doesn't remember this one, might be hard on your kidneys  - Augmentin: developed a rash a couple years ago, inside of one leg, lasted for 3-4 days. No tongue swelling, difficulty breathing. Just switched the antibiotic, did not get any other treatments for it.  - Cefprozil: GI upset; no rashes/swelling; last took > 10 years ago  - Erythromycin: ?maybe a rash; 8-10 years ago  - Sulfa antibiotics: had pink eye, made his eyes worse. As a kid.   - took it orally for strep and did fine 2 years ago  -  Macrolides: Unsure what this reaction was  - Penicillin: Unclear what the reaction was     Angioedema?   [] Yes     [x] No  Comments:     Urticaria?   [x] Yes     [] No  Comments:     Food allergies?   [x] Yes ?    [] No  Comments:   - is supposed to be receiving some testing for food allergies?  - has EoE    Pet allergies?   [x] Yes     [] No  Comments:   - some borderline reaction to cat      Environmental allergy symptoms?  [x] Conjunctivitis  [] Otitis  [] Pharyngitis  [] Polyposis  [] Postnasal Drip  [x] Rhinitis  [] Sinusitis  [] None  Comments:   - Bee pollen: seasonal allergies, takes antihistamines once in a while; does not remember allergy testing, maybe when he was a kid  - when grass is cut notices symptoms the most     HENT Operations?  [] Adenoids [] Septum [] Sinus  [] Tonsils        [] Other:   [] None  Comments:     Pulmonary symptoms (from birth to present)?  [] Asthma bronchiale  Inhaler(s)?:   [] Coughing  [] Other  [x] None  Comments:     Environmental and pulmonary symptoms aggravated by?  Season: [] None     [] I     [] II     [] III     [] IV     [x] V     [x] VI          [] VII     [] VIII     [] IX     [] X     [] XI     [] XII     [] Perennial  Time of Day: [] None     [] Morning     [] Noon     [] Evening     [] Night     [] Whole Day  Location/Changes: [] None     [] Inside     [] Outside     [] Mountain     [] Sea     [] Other:   Triggers (specific): [] None     [] Animals     [] Dust     [] Mold     [] Plants     [] Other:   Triggers (other): [] None     [] Psyche     [] Sport     [] Work     [] Other:   Irritant: [] None     [] Cold     [] Heat     [] Odors     [] Physical Efforts     [] Smoke     [] Other:     Order for PATCH TESTS  Reason for tests (suspected allergy): n/a  Known previous allergies: n/a  Standardized panels  [] Standard panel (40 tests)  [] Preservatives & Antimicrobials (31 tests)  [] Emulsifiers & Additives (25 tests)   [] Perfumes/Flavours & Plants (25 tests)  []  Hairdresser panel (12 tests)  [] Rubber Chemicals (22 tests)  [] Plastics (26 tests)  [] Colorants/Dyes/Food additives (20 tests)  [] Metals (implants/dental) (24 tests)  [] Local anaesthetics/NSAIDs (13 tests)  [] Antibiotics & Antimycotics (14 tests)   [] Corticosteroids (15 tests)   [] Photopatch test (62 tests)   [] Others:     [] Patient's Own Products:   DO NOT test if chemical or biological identity is unknown!   always ask from patient the product information and safety sheets (MSDS)       Order for PRICK TESTS    Reason for tests (suspected allergy): atopy?  Known previous allergies: prior     Standardized prick panels  [x] Atopic panel (20 tests)  [] Pediatric Panel (12 tests)  [] Milk, Meat, Eggs, Grains (20 tests)   [] Dust, Epithelia, Feathers (10 tests)  [] Fish, Seafood, Shellfish (17 tests)  [] Nuts, Beans (14 tests)  [] Spice, Vegetable, Fruit (17 tests)  [] Pollen Panel = Tree, Grass, Weed (24 tests)  [] Others:     [] Patient's Own Products:   DO NOT test if chemical or biological identity is unknown!   always ask from patient the product information and safety sheets (MSDS)     Standardized intradermal tests  [] Alternaria alternata  [] Aspergillus fumigatus  [] Cladosporium herbarum   [] Penicillium notatum  [] Dermatophagoides farinae  [] Dermatophagoides pteronyssinus  [] Dog Epithelium  [] Cat Epithelium  [] Others:     [] Bee venom   [] Wasp venom  !!Specific protocol with dilutions!!       Order for Drug allergy tests (prick & intradermal & patch tests)    [x] Penicillin G     [x] Ampicillin   [] Cefazolin (1st gen)     [] Cefuroxime (2nd gen)     [] Ceftriaxone (3rd gen)     [] Ceftazidime (3rd gen)     [] Cefepime (4th gen)       [] Bactrim  [] Iodixanol (Visipaque)     [] Iopamidol (Isovue)       [] Iohexol (Omnipaque)  [x] Others: Augmentin, Erythromycin  [] Patient's Own:   Order for ... as test date      Atopy Screen (Placed June 24, 2025)  6/24/25: Patient currently on nitrofurantoin  and prednisone   No Substance Readings (15 min) Evaluation   POS Histamine 1mg/ml +    NEG NaCl 0.9% -      No Substance Readings (15 min) Evaluation   1 Alternaria alternata (tenuis)  +/++    2 Cladosporium herbarum -    3 Aspergillus fumigatus -    4 Penicillium notatum -    5 Dermatophagoides pteronyssinus -    6 Dermatophagoides farinae -    7 Dog epithelium (canis spp) -    8 Cat hair (marita catus) -    9 Cockroach   (Blatella americana & germanica) -    10 Grass mix midwest   (Blanka, Orchard, Redtop, Max) -    11 Etienne grass (sorghum halepense) -    12 Weed mix   (common Cocklebur, Lamb s quarters, rough redroot Pigweed, Dock/Sorrel) -    13 Mug wort (artemisia vulgare) -    14 Ragweed giant/short (ambrosia spp) -    15 White birch (Betula papyrifera) -    16 Tree mix 1 (Pecan, Maple BHR, Oak RVW, american [place], black [place]) -    17 Red cedar (juniperus virginia) -    18 Tree mix 2   (white Norberto, river/red Birch, black [place], common Chisago, american Elm) -    19 Box elder/Maple mix (acer spp) -    20 [place] shagbark (carya ovata) -           Conclusion    Milk(Placed Jun 24, 2025 )  No Substance Readings (15min) Evaluation   1 Cows milk whole (lillian arnaud) -    2 Casein (lillian arnaud) -      Conclusion:      DRUG ALLERGY TEST SERIES June 24, 2025      Prick Tests         Substance/ Allergen Conc Result (20 min) Remarks    Benzylpenicillin (Penicillin G) 1 [place] IU/ml (600 mg) -     Ampicillin 100mg/ml -     Amoxicillin/Clavulanic acid 50 mg/ml -     Erythromycin 50 mg/ml -       Intradermal Tests   immed immed delay delay      Substance Conc 1st dil  2nd dil  2 days  4 days remarks    Benzylpenicillin (Penicillin G) 1:100 -        Ampicillin 1:10 -        Amoxicillin/Clavulanic acid 1:10 / 1:100 -        Erythromycin 1:1000/ 1:200 -           Patch Tests  as is as is 1:2 1:2     As Is  Vas Substance Conc 2 days 4 days 2 days 4 days remarks   1 2 Benzylpenicillin (Penicillin G) 1 [place] IU/ml (600 mg)         3 4 Ampicillin 100 mg/ml        5 6 Amoxicillin/ Clavulanic acid 50 mg/ml        7 8 Erythromycin 50 mg/ml            ___________________________    Assessment & Plan:    ==> Final Diagnosis:     # Ruling out multiple drug reactions (pre-transplant)  Penicillin and amoxicillin unlikely allergic reaction (localized rash) ==> skin tests negatives  Sulfa antibiotics, unknown reaction in childhood (had later on with no problem, no allergy)  Erythromycin, unclear reaction ==> skin tests negatives  * chronic illness with exacerbation, progression, side effects from treatment    # Atopic predisposition with:   Seasonal RC and contact urticaria in summer and with grass  Positive prick test to alternaria   Proven eosinophilic esophagitis   Borderline RC to cat   * chronic illness with exacerbation, progression, side effects from treatment      These conclusions are made at the best of one's knowledge and belief based on the provided evidence such as patient's history and allergy test results and they can change over time or can be incomplete because of missing information.    ==> Treatment Plan:    >> For IDT/patch test sites from today, patient will monitor sites for the next 2 and 4 days. If there are any delayed reactions, patient will take photos with the ruler provided and send to clinic via [employer] message for review. Be sure to specify RIGHT or LEFT arm.  - Do not apply topical steroids to sites until after 4 total days.     >> For completed prick, intradermal, and patch tests, there were no signs for specific immediate or delayed type reactions to penicillins (penicillin G, ampicillin, amoxicillin/clavulanic acid) and erythromycin.   ==> Can be used if necessary. For 1st dose, keep patient in clinic for observation for 30 minutes.       Procedures Performed: Allergy tests, including prick and drug allergy tests     Staff Involved: Provider, Staff, Resident, and Scribe    Scribe Disclosure:   Humaira JONES,  am serving as a scribe to document services personally performed by Brad Berry MD based on data collection and the provider's statements to me.     Staff Physician Comments:  I was present with the scribe who participated in the documentation of the note. I have verified the history and personally performed the physical exam and medical decision making. I agree with the assessment and plan as documented in the note. I have reviewed and if necessary amended the note.      Brad Berry MD  Professor  Head of Dermato-Allergy Division  Department of Dermatology  [facility]      Follow-up in Derm-Allergy clinic for delayed readings  ___________________________    I spent a total of 37 minutes with Matthew Arias during today s  visit. This time was spent discussing all the individual test results, correlating them to the clinical relevance, counseling the patient and/or coordinating care and performing allergy tests or procedures.

## 2025-06-26 LAB
PROTOCOL CUTOFF: NORMAL
SA 1  COMMENTS: NORMAL
SA 1 CELL: NORMAL
SA 1 TEST METHOD: NORMAL
SA 2 CELL: NORMAL
SA 2 COMMENTS: NORMAL
SA 2 TEST METHOD: NORMAL
SA1 HI RISK ABY: NORMAL
SA1 MOD RISK ABY: NORMAL
SA2 HI RISK ABY: NORMAL
SA2 MOD RISK ABY: NORMAL
UNACCEPTABLE ANTIGENS: NORMAL
UNOS CPRA: 88

## 2025-07-02 ENCOUNTER — TELEPHONE (OUTPATIENT)
Dept: INFECTIOUS DISEASES | Facility: CLINIC | Age: 40
End: 2025-07-02
Payer: COMMERCIAL

## 2025-07-02 DIAGNOSIS — A04.5 CAMPYLOBACTER GASTROENTERITIS: ICD-10-CM

## 2025-07-14 ENCOUNTER — HOSPITAL ENCOUNTER (OUTPATIENT)
Dept: CARDIOLOGY | Facility: CLINIC | Age: 40
Setting detail: NUCLEAR MEDICINE
Discharge: HOME | End: 2025-07-14
Attending: NURSE PRACTITIONER
Payer: COMMERCIAL

## 2025-07-14 ENCOUNTER — HOSPITAL ENCOUNTER (OUTPATIENT)
Dept: NUCLEAR MEDICINE | Facility: CLINIC | Age: 40
Setting detail: NUCLEAR MEDICINE
Discharge: HOME | End: 2025-07-14
Attending: NURSE PRACTITIONER
Payer: COMMERCIAL

## 2025-07-14 VITALS — DIASTOLIC BLOOD PRESSURE: 63 MMHG | HEART RATE: 87 BPM | SYSTOLIC BLOOD PRESSURE: 138 MMHG

## 2025-07-14 DIAGNOSIS — D84.9 IMMUNOSUPPRESSION: ICD-10-CM

## 2025-07-14 DIAGNOSIS — Z94.4 HISTORY OF LIVER TRANSPLANT (H): ICD-10-CM

## 2025-07-14 DIAGNOSIS — Z01.818 PRE-TRANSPLANT EVALUATION FOR KIDNEY TRANSPLANT: ICD-10-CM

## 2025-07-14 DIAGNOSIS — Z76.82 ORGAN TRANSPLANT CANDIDATE: ICD-10-CM

## 2025-07-14 DIAGNOSIS — N18.4 STAGE 4 CHRONIC KIDNEY DISEASE (H): ICD-10-CM

## 2025-07-14 DIAGNOSIS — Z76.82 AWAITING ORGAN TRANSPLANT: ICD-10-CM

## 2025-07-14 DIAGNOSIS — Z01.810 PRE-OPERATIVE CARDIOVASCULAR EXAMINATION: ICD-10-CM

## 2025-07-14 DIAGNOSIS — D64.9 ANEMIA: ICD-10-CM

## 2025-07-14 LAB
CV STRESS MAX HR HE: 93
RATE PRESSURE PRODUCT: NORMAL
STRESS ECHO BASELINE DIASTOLIC HE: 66
STRESS ECHO BASELINE HR: 74 BPM
STRESS ECHO BASELINE SYSTOLIC BP: 129
STRESS ECHO CALCULATED PERCENT HR: 51 %
STRESS ECHO LAST STRESS DIASTOLIC BP: 48
STRESS ECHO LAST STRESS SYSTOLIC BP: 146
STRESS ECHO TARGET HR: 181

## 2025-07-14 PROCEDURE — 343N000001 HC RX 343 MED OP 636: Performed by: NURSE PRACTITIONER

## 2025-07-14 PROCEDURE — 78452 HT MUSCLE IMAGE SPECT MULT: CPT | Mod: 26 | Performed by: INTERNAL MEDICINE

## 2025-07-14 PROCEDURE — 78452 HT MUSCLE IMAGE SPECT MULT: CPT

## 2025-07-14 PROCEDURE — 93017 CV STRESS TEST TRACING ONLY: CPT

## 2025-07-14 PROCEDURE — 250N000011 HC RX IP 250 OP 636: Performed by: INTERNAL MEDICINE

## 2025-07-14 PROCEDURE — 93016 CV STRESS TEST SUPVJ ONLY: CPT | Performed by: INTERNAL MEDICINE

## 2025-07-14 PROCEDURE — A9502 TC99M TETROFOSMIN: HCPCS | Performed by: NURSE PRACTITIONER

## 2025-07-14 PROCEDURE — 93018 CV STRESS TEST I&R ONLY: CPT | Performed by: INTERNAL MEDICINE

## 2025-07-14 RX ORDER — CAFFEINE CITRATE 20 MG/ML
60 SOLUTION INTRAVENOUS
Status: DISCONTINUED | OUTPATIENT
Start: 2025-07-14 | End: 2025-07-14 | Stop reason: HOSPADM

## 2025-07-14 RX ORDER — CAFFEINE 200 MG
200 TABLET ORAL
Status: DISCONTINUED | OUTPATIENT
Start: 2025-07-14 | End: 2025-07-14 | Stop reason: HOSPADM

## 2025-07-14 RX ORDER — AMINOPHYLLINE 25 MG/ML
50-100 INJECTION, SOLUTION INTRAVENOUS
Status: DISCONTINUED | OUTPATIENT
Start: 2025-07-14 | End: 2025-07-15 | Stop reason: HOSPADM

## 2025-07-14 RX ORDER — LIDOCAINE 40 MG/G
CREAM TOPICAL
Status: DISCONTINUED | OUTPATIENT
Start: 2025-07-14 | End: 2025-07-15 | Stop reason: HOSPADM

## 2025-07-14 RX ORDER — REGADENOSON 0.08 MG/ML
0.4 INJECTION, SOLUTION INTRAVENOUS ONCE
Status: COMPLETED | OUTPATIENT
Start: 2025-07-14 | End: 2025-07-14

## 2025-07-14 RX ORDER — ALBUTEROL SULFATE 90 UG/1
2 INHALANT RESPIRATORY (INHALATION) EVERY 5 MIN PRN
Status: DISCONTINUED | OUTPATIENT
Start: 2025-07-14 | End: 2025-07-15 | Stop reason: HOSPADM

## 2025-07-14 RX ADMIN — REGADENOSON 0.4 MG: 0.08 INJECTION, SOLUTION INTRAVENOUS at 11:28

## 2025-07-14 RX ADMIN — TETROFOSMIN 36.4 MILLICURIE: 1.38 INJECTION, POWDER, LYOPHILIZED, FOR SOLUTION INTRAVENOUS at 11:31

## 2025-07-14 RX ADMIN — TETROFOSMIN 10.8 MILLICURIE: 1.38 INJECTION, POWDER, LYOPHILIZED, FOR SOLUTION INTRAVENOUS at 10:06

## 2025-07-15 ENCOUNTER — VIRTUAL VISIT (OUTPATIENT)
Dept: NEPHROLOGY | Facility: CLINIC | Age: 40
End: 2025-07-15
Attending: PHYSICIAN ASSISTANT
Payer: COMMERCIAL

## 2025-07-15 VITALS — BODY MASS INDEX: 17.85 KG/M2 | WEIGHT: 128 LBS

## 2025-07-15 DIAGNOSIS — D84.9 IMMUNOSUPPRESSION: ICD-10-CM

## 2025-07-15 DIAGNOSIS — N18.4 ANEMIA IN STAGE 4 CHRONIC KIDNEY DISEASE (H): ICD-10-CM

## 2025-07-15 DIAGNOSIS — N18.4 CKD (CHRONIC KIDNEY DISEASE) STAGE 4, GFR 15-29 ML/MIN (H): Primary | ICD-10-CM

## 2025-07-15 DIAGNOSIS — R80.1 PERSISTENT PROTEINURIA: ICD-10-CM

## 2025-07-15 DIAGNOSIS — N25.81 SECONDARY HYPERPARATHYROIDISM: ICD-10-CM

## 2025-07-15 DIAGNOSIS — Z94.4 HISTORY OF LIVER TRANSPLANT (H): ICD-10-CM

## 2025-07-15 DIAGNOSIS — D63.1 ANEMIA IN STAGE 4 CHRONIC KIDNEY DISEASE (H): ICD-10-CM

## 2025-07-15 DIAGNOSIS — E87.20 METABOLIC ACIDOSIS: ICD-10-CM

## 2025-07-15 NOTE — LETTER
7/15/2025       RE: Matthew Arias  [address]  [place] [address]     Dear Colleague,    Thank you for referring your patient, Matthew Arias, to the [facility] [place] at [facility]. Please see a copy of my visit note below.    Virtual Visit Details    Type of service:  Video Visit   Start time: 8:48 am  End time: 9:07 am  Originating Location (pt. 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  His Scr has been 2-2.5, now up to 3 range, eGFR 22-25 ml/min    CKD stage 4- Scr fluctuates between 2.6-3s eGFR was 28ml/min by cystatin C in 2022 , it was 2.7 with GFR 23 in October 2023 a little lower than creatinine of 2.8 with eGFR which was 28  Recurrent TRISTON episodes  B/l multiple renal cysts  Pt with baseline creatinine of 2.2 to 2.8 since 2017 with eGFR on 30 and 40's which put him in CKD stage 3b. Creatinine can up trend to 3's with eGFR in 20's but improves to eGFR of 30's. UA was bland without hematuria. Noted to have proteinuria but repeat was WNL. CT abdomen in June 2022 showed multiple cysts likely hemorrhagic but  stable with no nephrolithiasis or hydronephrosis noted. No genetic testing done.  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 Change in creatinine likely 2/2 hypovolemia and recent start of SGLT2 inhibitor for kidney protection . Pt denied any ibuprofen or other NSAID's Use.  - had recent transplanted liver biopsy as part of the kidney transplant eval   - 5/2025 cystatin C 3.2, GFR 18, from 3/2025 Scr 2.7, eGFR 29, cystatin C 2.8, GFR 22  - UACR 104 mg/g from 1904 mg/g (1.2024)  He was previously noted to have 1.9 g of  albuminuria, up to 3.5 grams of protein, then  back to 1 g/g cr.  Not sure if he was ill or had other factors.   Discussed option for ACE-I or SGLT2 inh and he started SGLT2 inhibitor October 2024  - started jardiance 10 mg daily, now on hold given cystatin GFR 18  - referred to transplant given GFR 22 by cystatin C  - approved and is completing the requirements  - referred for CKD journey  - completed kidney smart class 1/2025, leaning towards incenter HD  - has low energy, decreased appetite - unchanged  - check labs       Hypertension :  /63 recently in clinic, usually 110s-120s/ at home.   Was on losartan but discontinued because of hypotension. Not on BP meds now.  - continue to monitor, proteinuria improving with jardiance    Electrolytes :  Hyperkalemia, mild, improved   K 4.5, Na 138   Continue good hydration, continuing bicarb supplementation will help  Follow low potassium diet    BMD :  Secondary hyperparathyroidism   6/29/2025  Calcium 8.9; 5/29/25 vitamin D 36 PTH 84  PTH is elevated likely 2/2 CKD.  - started cholecalciferol 1000mg daily  - check PTH and Vit D in 4 months    Metabolic acidosis :   Bicarb 18 (6/29/25)  Taking sodium bicarb 650 mg BID  - if remains < 22, will increase sodium bicarb     Anemia :hgb 11.3 usually in 12-13 range, mild , iron sat 35% (5/29/25)  - check iron status yearly  - no indication for epo      #Gastric/esophageal ulcer  had upper endoscopy on 5/27/25 with [facility].   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  Was on CNI, now on MMF and pred 5mg    #Disposition: labs tomorrow and follow up in 2 months with me, and September with Dr. Carmona.      Reason for visit: Followup CKD stage 4, post liver transplant    HPI: Matthew Arias is a 39 year old male with PMH significant for biliary atresia s/p 2 liver  transplants, CKD, seizures who presents for followup of CKD.     Pt presented to nephrology to establish care for his CKD. He was first aware of CKD after his second transplant in 2001 Since that time he was known to have CKD that was slowly progressing. His transplant was done in [place], so he wasfollowing with nephrology there, but he moved to [place], he was recommend to establish nephrology care closer to him. He still see transplant team once a year in [place].  At one point he was seen in [place], last visit was in Dec 2022, but his insurance changed, thus established care with U or M.  Stopped jardiance given cystatin GFR down to 18.  Endorsed that with his first liver he had multiple rejection episodes but with second one, he has been more stable.     He was hospitalized in fall of 2024 for a few days with likely GI illness/ bug.    He was seen in ED on 6/29/25 for general malaise. No acute concerns, received IV fluids and was discharged home on supportive cares.     He is feeling better today. No fevers/chills. He has no LE edema. He denies n/v/d.       He is no longer working.     He is fatigued and has a decreased appetite. He has no n/v/d. He denies LE edema. He is scheduled for ECHO and stress exercise test. He has upcoming appt with infectious disease and allergy. He feels like there is no end in sight to his medical issues. He has been seeing a therapist.      He had a renal US in May 2025 which showed bilateral atrophic kidneys measuring ~ 7 cm bilaterally with echogenicity suggesting medical renal disease and bilateral simple cysts.        Allergies   Allergen Reactions     Levofloxacin      Other Reaction(s): Renal Failure    Hx of renal failure       Current Outpatient Medications   Medication Sig Dispense Refill     budesonide (PULMICORT) 1 MG/2ML neb solution Take 1 mg by nebulization daily.       buPROPion (WELLBUTRIN SR) 100 MG 12 hr tablet Take 150 mg by mouth       empagliflozin (JARDIANCE) 10 MG  TABS tablet Take 1 tablet (10 mg) by mouth daily. (Patient not taking: Reported on 6/17/2025) 90 tablet 3     FLUoxetine (PROZAC) 20 MG capsule TAKE 1 CAPSULE(20 MG) BY MOUTH EVERY MORNING       levETIRAcetam (KEPPRA) 500 MG tablet Take 500 mg by mouth every 12 hours       mycophenolate (GENERIC EQUIVALENT) 250 MG capsule Take 2 capsules by mouth every 12 hours       predniSONE (DELTASONE) 5 MG tablet Take 10 mg by mouth daily       sodium bicarbonate 650 MG tablet Take 1 tablet (650 mg) by mouth 2 times daily. 180 tablet 3     Vitamin D3 (CHOLECALCIFEROL) 25 mcg (1000 units) tablet Take 1 tablet (25 mcg) by mouth daily. 90 tablet 3     Current Facility-Administered Medications   Medication Dose Route Frequency Provider Last Rate Last Admin     amoxicillin-clavulanate (AUGMENTIN) 875-125 MG 1 tablet for allergy testing   Other Once Brad Berry MD         Facility-Administered Medications Ordered in Other Visits   Medication Dose Route Frequency Provider Last Rate Last Admin     albuterol (PROVENTIL HFA/VENTOLIN HFA) inhaler  2 puff Inhalation Q5 Min PRN AURY Washington MD         aminophylline  mg   mg Intravenous Once PRN AURY Washington MD         lidocaine (LMX4) cream   Topical Q1H PRN AURY Washington MD         lidocaine 1 % 1 mL  1 mL Other Q1H PRN AURY Washington MD         sodium chloride (PF) 0.9% PF flush 3 mL  3 mL Intravenous Q1H PRN AURY Washington MD         sodium chloride (PF) 0.9% PF flush 3 mL  3 mL Intravenous Q8H AURY Washington MD           Past Medical History:   Diagnosis Date     Anaplasmosis      Emanuel esophagus      Biliary atresia (H)      Brain aneurysm 02/2013    s/p clipping     Campylobacter gastroenteritis 11/2024     Cirrhosis of liver (H)      CKD (chronic kidney disease) stage 4, GFR 15-29 ml/min (H)      Colitis due to Clostridium difficile      Eosinophilic esophagitis      Epileptic grand mal status (H) 2019     Osteopenia      Renal  cyst bilateral      Underweight      Biliary atresia    Past Surgical History:   Procedure Laterality Date     IR LIVER BIOPSY PERCUTANEOUS  4/30/2025     LIVER TRANSPLANTATION      1986, 2001     Liver transplant 1986 and 2000    Social History     Tobacco Use     Smoking status: Never     Smokeless tobacco: Never   Vaping Use     Vaping status: Never Used   Substance Use Topics     Alcohol use: Not Currently     Comment: Last drink 3 months ago     Drug use: Never       Family History   Problem Relation Age of Onset     No Known Problems Mother      No Known Problems Father      No Known Problems Sister      Kidney Disease Paternal Grandfather        ROS: A comprehensive review of systems was negative other than noted here or above.     Exam:  There were no vitals taken for this visit.    GENERAL APPEARANCE: alert and no distress      Results:      Last Comprehensive Metabolic Panel:  Lab Results   Component Value Date     05/29/2025    POTASSIUM 4.1 05/29/2025    CHLORIDE 108 (H) 05/29/2025    CO2 22 05/29/2025    ANIONGAP 10 05/29/2025    GLC 94 05/29/2025    BUN 29.2 (H) 05/29/2025    CR 3.74 (H) 05/29/2025    GFRESTIMATED 20 (L) 05/29/2025    MYRNA 9.3 05/29/2025       Renal US 5/29/25  IMPRESSION:  1.  Kidneys are atrophic with increased echogenicity consistent with  medical renal disease.  2.  Multiple cysts are demonstrated. These appear simple without any  suspicious findings appreciated sonographically. No discrete follow-up  required.      Jeannine Nesbitt PA-C    Visit length 19 minutes. An additional 25 minutes were spent on date of service in chart review, documentation, and other activities as noted.         Again, thank you for allowing me to participate in the care of your patient.      Sincerely,    JEANNINE BOLDEN PA-C

## 2025-07-15 NOTE — NURSING NOTE
Current patient location: [address] [place] [id number]    Is the patient currently in the state of [place]? YES    Visit mode: VIDEO    If the visit is dropped, the patient can be reconnected by:VIDEO VISIT: Text to cell phone:   Telephone Information:   Mobile [phone]       Will anyone else be joining the visit? NO  (If patient encounters technical issues they should call [phone]    Are changes needed to the allergy or medication list? No    Are refills needed on medications prescribed by this physician? NO    Rooming Documentation:  Questionnaire(s) not pre-assigned    Reason for visit: RECHECK    Elis CELISF

## 2025-07-15 NOTE — PATIENT INSTRUCTIONS
Schedule lab appt on 7/22/2025. Will call with results/recommendations.   Avoid ibuprofen/aleve/advil.   Continue good hydration, low sodium diet.   Check blood pressure at home, keep log.   Labs and follow up in September with Dr. Carmona as planned.

## 2025-07-15 NOTE — PROGRESS NOTES
Virtual Visit Details    Type of service:  Video Visit   Start time: 8:48 am  End time: 9:07 am  Originating Location (pt. 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  His Scr has been 2-2.5, now up to 3 range, eGFR 22-25 ml/min    CKD stage 4- Scr fluctuates between 2.6-3s eGFR was 28ml/min by cystatin C in 2022 , it was 2.7 with GFR 23 in October 2023 a little lower than creatinine of 2.8 with eGFR which was 28  Recurrent TRISTON episodes  B/l multiple renal cysts  Pt with baseline creatinine of 2.2 to 2.8 since 2017 with eGFR on 30 and 40's which put him in CKD stage 3b. Creatinine can up trend to 3's with eGFR in 20's but improves to eGFR of 30's. UA was bland without hematuria. Noted to have proteinuria but repeat was WNL. CT abdomen in June 2022 showed multiple cysts likely hemorrhagic but  stable with no nephrolithiasis or hydronephrosis noted. No genetic testing done.  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 Change in creatinine likely 2/2 hypovolemia and recent start of SGLT2 inhibitor for kidney protection . Pt denied any ibuprofen or other NSAID's Use.  - had recent transplanted liver biopsy as part of the kidney transplant eval   - 5/2025 cystatin C 3.2, GFR 18, from 3/2025 Scr 2.7, eGFR 29, cystatin C 2.8, GFR 22  - UACR 104 mg/g from 1904 mg/g (1.2024)  He was previously noted to have 1.9 g of albuminuria, up to 3.5 grams of protein, then  back to 1 g/g cr.  Not sure if he was ill or had other factors.   Discussed option for ACE-I or SGLT2 inh and he started SGLT2 inhibitor October 2024  - started jardiance 10 mg daily, now on hold given cystatin GFR 18  - referred to transplant given GFR 22 by cystatin C  -  approved and is completing the requirements  - referred for CKD journey  - completed kidney smart class 1/2025, leaning towards incenter HD  - has low energy, decreased appetite - unchanged  - check labs       Hypertension :  /63 recently in clinic, usually 110s-120s/ at home.   Was on losartan but discontinued because of hypotension. Not on BP meds now.  - continue to monitor, proteinuria improving with jardiance    Electrolytes :  Hyperkalemia, mild, improved   K 4.5, Na 138   Continue good hydration, continuing bicarb supplementation will help  Follow low potassium diet    BMD :  Secondary hyperparathyroidism   6/29/2025  Calcium 8.9; 5/29/25 vitamin D 36 PTH 84  PTH is elevated likely 2/2 CKD.  - started cholecalciferol 1000mg daily  - check PTH and Vit D in 4 months    Metabolic acidosis :   Bicarb 18 (6/29/25)  Taking sodium bicarb 650 mg BID  - if remains < 22, will increase sodium bicarb     Anemia :hgb 11.3 usually in 12-13 range, mild , iron sat 35% (5/29/25)  - check iron status yearly  - no indication for epo      #Gastric/esophageal ulcer  had upper endoscopy on 5/27/25 with [facility].   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  Was on CNI, now on MMF and pred 5mg    #Disposition: labs tomorrow and follow up in 2 months with me, and September with Dr. Carmona.      Reason for visit: Followup CKD stage 4, post liver transplant    HPI: Matthew Arias is a 39 year old male with PMH significant for biliary atresia s/p 2 liver transplants, CKD, seizures who presents for followup of CKD.     Pt presented to nephrology to establish care for his CKD. He was first aware of CKD after his second transplant in 2001 Since that time he was known to have CKD that was slowly progressing. His transplant was done in [place], so he wasfollowing with  nephrology there, but he moved to [place], he was recommend to establish nephrology care closer to him. He still see transplant team once a year in [place].  At one point he was seen in [place], last visit was in Dec 2022, but his insurance changed, thus established care with U or M.  Stopped jardiance given cystatin GFR down to 18.  Endorsed that with his first liver he had multiple rejection episodes but with second one, he has been more stable.     He was hospitalized in fall of 2024 for a few days with likely GI illness/ bug.    He was seen in ED on 6/29/25 for general malaise. No acute concerns, received IV fluids and was discharged home on supportive cares.     He is feeling better today. No fevers/chills. He has no LE edema. He denies n/v/d.       He is no longer working.     He is fatigued and has a decreased appetite. He has no n/v/d. He denies LE edema. He is scheduled for ECHO and stress exercise test. He has upcoming appt with infectious disease and allergy. He feels like there is no end in sight to his medical issues. He has been seeing a therapist.      He had a renal US in May 2025 which showed bilateral atrophic kidneys measuring ~ 7 cm bilaterally with echogenicity suggesting medical renal disease and bilateral simple cysts.        Allergies   Allergen Reactions    Levofloxacin      Other Reaction(s): Renal Failure    Hx of renal failure       Current Outpatient Medications   Medication Sig Dispense Refill    budesonide (PULMICORT) 1 MG/2ML neb solution Take 1 mg by nebulization daily.      buPROPion (WELLBUTRIN SR) 100 MG 12 hr tablet Take 150 mg by mouth      empagliflozin (JARDIANCE) 10 MG TABS tablet Take 1 tablet (10 mg) by mouth daily. (Patient not taking: Reported on 6/17/2025) 90 tablet 3    FLUoxetine (PROZAC) 20 MG capsule TAKE 1 CAPSULE(20 MG) BY MOUTH EVERY MORNING      levETIRAcetam (KEPPRA) 500 MG tablet Take 500 mg by mouth every 12 hours      mycophenolate (GENERIC EQUIVALENT) 250 MG capsule  Take 2 capsules by mouth every 12 hours      predniSONE (DELTASONE) 5 MG tablet Take 10 mg by mouth daily      sodium bicarbonate 650 MG tablet Take 1 tablet (650 mg) by mouth 2 times daily. 180 tablet 3    Vitamin D3 (CHOLECALCIFEROL) 25 mcg (1000 units) tablet Take 1 tablet (25 mcg) by mouth daily. 90 tablet 3     Current Facility-Administered Medications   Medication Dose Route Frequency Provider Last Rate Last Admin    amoxicillin-clavulanate (AUGMENTIN) 875-125 MG 1 tablet for allergy testing   Other Once Brad Berry MD         Facility-Administered Medications Ordered in Other Visits   Medication Dose Route Frequency Provider Last Rate Last Admin    albuterol (PROVENTIL HFA/VENTOLIN HFA) inhaler  2 puff Inhalation Q5 Min PRN AURY Washington MD        aminophylline  mg   mg Intravenous Once PRN AURY Washington MD        lidocaine (LMX4) cream   Topical Q1H PRN AURY Washington MD        lidocaine 1 % 1 mL  1 mL Other Q1H PRN AURY Washington MD        sodium chloride (PF) 0.9% PF flush 3 mL  3 mL Intravenous Q1H PRN AURY Washington MD        sodium chloride (PF) 0.9% PF flush 3 mL  3 mL Intravenous Q8H AURY Washington MD           Past Medical History:   Diagnosis Date    Anaplasmosis     Emanuel esophagus     Biliary atresia (H)     Brain aneurysm 02/2013    s/p clipping    Campylobacter gastroenteritis 11/2024    Cirrhosis of liver (H)     CKD (chronic kidney disease) stage 4, GFR 15-29 ml/min (H)     Colitis due to Clostridium difficile     Eosinophilic esophagitis     Epileptic grand mal status (H) 2019    Osteopenia     Renal cyst bilateral     Underweight      Biliary atresia    Past Surgical History:   Procedure Laterality Date    IR LIVER BIOPSY PERCUTANEOUS  4/30/2025    LIVER TRANSPLANTATION      1986, 2001     Liver transplant 1986 and 2000    Social History     Tobacco Use    Smoking status: Never    Smokeless tobacco: Never   Vaping Use    Vaping status:  Never Used   Substance Use Topics    Alcohol use: Not Currently     Comment: Last drink 3 months ago    Drug use: Never       Family History   Problem Relation Age of Onset    No Known Problems Mother     No Known Problems Father     No Known Problems Sister     Kidney Disease Paternal Grandfather        ROS: A comprehensive review of systems was negative other than noted here or above.     Exam:  There were no vitals taken for this visit.    GENERAL APPEARANCE: alert and no distress      Results:      Last Comprehensive Metabolic Panel:  Lab Results   Component Value Date     05/29/2025    POTASSIUM 4.1 05/29/2025    CHLORIDE 108 (H) 05/29/2025    CO2 22 05/29/2025    ANIONGAP 10 05/29/2025    GLC 94 05/29/2025    BUN 29.2 (H) 05/29/2025    CR 3.74 (H) 05/29/2025    GFRESTIMATED 20 (L) 05/29/2025    MYRNA 9.3 05/29/2025       Renal US 5/29/25  IMPRESSION:  1.  Kidneys are atrophic with increased echogenicity consistent with  medical renal disease.  2.  Multiple cysts are demonstrated. These appear simple without any  suspicious findings appreciated sonographically. No discrete follow-up  required.      Dee Dee Nesbitt PA-C    Visit length 19 minutes. An additional 25 minutes were spent on date of service in chart review, documentation, and other activities as noted.

## 2025-07-22 ENCOUNTER — ALLIED HEALTH/NURSE VISIT (OUTPATIENT)
Dept: TRANSPLANT | Facility: CLINIC | Age: 40
End: 2025-07-22
Payer: COMMERCIAL

## 2025-07-22 DIAGNOSIS — A04.5 CAMPYLOBACTER GASTROENTERITIS: ICD-10-CM

## 2025-07-22 DIAGNOSIS — Z23 NEED FOR VACCINATION: Primary | ICD-10-CM

## 2025-07-22 PROCEDURE — 90619 MENACWY-TT VACCINE IM: CPT | Performed by: INTERNAL MEDICINE

## 2025-07-22 PROCEDURE — 90471 IMMUNIZATION ADMIN: CPT | Performed by: INTERNAL MEDICINE

## 2025-07-22 PROCEDURE — 90620 MENB-4C VACCINE IM: CPT | Performed by: INTERNAL MEDICINE

## 2025-07-22 PROCEDURE — 90472 IMMUNIZATION ADMIN EACH ADD: CPT | Performed by: INTERNAL MEDICINE

## 2025-07-22 PROCEDURE — 250N000011 HC RX IP 250 OP 636: Performed by: INTERNAL MEDICINE

## 2025-07-22 PROCEDURE — 90750 HZV VACC RECOMBINANT IM: CPT | Performed by: INTERNAL MEDICINE

## 2025-07-22 PROCEDURE — 250N000021 HC RX MED A9270 GY (STAT IND- M) 250: Performed by: INTERNAL MEDICINE

## 2025-07-22 RX ADMIN — ZOSTER VACCINE RECOMBINANT, ADJUVANTED 0.5 ML: KIT at 11:30

## 2025-07-22 RX ADMIN — NEISSERIA MENINGITIDIS GROUP A CAPSULAR POLYSACCHARIDE TETANUS TOXOID CONJUGATE ANTIGEN, NEISSERIA MENINGITIDIS GROUP C CAPSULAR POLYSACCHARIDE TETANUS TOXOID CONJUGATE ANTIGEN, NEISSERIA MENINGITIDIS GROUP Y CAPSULAR POLYSACCHARIDE TETANUS TOXOID CONJUGATE ANTIGEN, AND NEISSERIA MENINGITIDIS GROUP W-135 CAPSULAR POLYSACCHARIDE TETANUS TOXOID CONJUGATE ANTIGEN 0.5 ML: 10; 10; 10; 10 INJECTION, SOLUTION INTRAMUSCULAR at 11:31

## 2025-07-22 RX ADMIN — NEISSERIA MENINGITIDIS SEROGROUP B NHBA FUSION PROTEIN ANTIGEN, NEISSERIA MENINGITIDIS SEROGROUP B FHBP FUSION PROTEIN ANTIGEN AND NEISSERIA MENINGITIDIS SEROGROUP B NADA PROTEIN ANTIGEN 0.5 ML: 50; 50; 50; 25 INJECTION, SUSPENSION INTRAMUSCULAR at 11:30

## 2025-07-22 NOTE — NURSING NOTE
Chief Complaint   Patient presents with    Allied Health Visit     Injections       Patient's first name, last name and  verified prior to medication administration, injection given without complications or questions.     See MAR for administration details    Lisbeth Gibson CMA   2025 11:33 AM

## 2025-07-30 ENCOUNTER — LAB (OUTPATIENT)
Dept: LAB | Facility: CLINIC | Age: 40
End: 2025-07-30
Payer: COMMERCIAL

## 2025-07-30 DIAGNOSIS — N18.4 CKD (CHRONIC KIDNEY DISEASE) STAGE 4, GFR 15-29 ML/MIN (H): ICD-10-CM

## 2025-07-30 LAB
HGB BLD-MCNC: 11.7 G/DL (ref 13.3–17.7)
MCV RBC AUTO: 92 FL (ref 78–100)

## 2025-07-30 PROCEDURE — 80069 RENAL FUNCTION PANEL: CPT

## 2025-07-30 PROCEDURE — 36415 COLL VENOUS BLD VENIPUNCTURE: CPT

## 2025-07-30 PROCEDURE — 85018 HEMOGLOBIN: CPT

## 2025-07-30 PROCEDURE — 84156 ASSAY OF PROTEIN URINE: CPT

## 2025-07-31 LAB
ALBUMIN MFR UR ELPH: 7 MG/DL
ALBUMIN SERPL BCG-MCNC: 3.8 G/DL (ref 3.5–5.2)
ANION GAP SERPL CALCULATED.3IONS-SCNC: 8 MMOL/L (ref 7–15)
BUN SERPL-MCNC: 33.4 MG/DL (ref 6–20)
CALCIUM SERPL-MCNC: 9.4 MG/DL (ref 8.8–10.4)
CHLORIDE SERPL-SCNC: 109 MMOL/L (ref 98–107)
CREAT SERPL-MCNC: 2.82 MG/DL (ref 0.67–1.17)
CREAT UR-MCNC: 69.4 MG/DL
EGFRCR SERPLBLD CKD-EPI 2021: 28 ML/MIN/1.73M2
GLUCOSE SERPL-MCNC: 84 MG/DL (ref 70–99)
HCO3 SERPL-SCNC: 20 MMOL/L (ref 22–29)
PHOSPHATE SERPL-MCNC: 3.4 MG/DL (ref 2.5–4.5)
POTASSIUM SERPL-SCNC: 5.4 MMOL/L (ref 3.4–5.3)
PROT/CREAT 24H UR: 0.1 MG/MG CR (ref 0–0.2)
SODIUM SERPL-SCNC: 137 MMOL/L (ref 135–145)

## 2025-08-12 ENCOUNTER — DOCUMENTATION ONLY (OUTPATIENT)
Dept: TRANSPLANT | Facility: CLINIC | Age: 40
End: 2025-08-12
Payer: COMMERCIAL

## 2025-08-12 DIAGNOSIS — A04.5 CAMPYLOBACTER GASTROENTERITIS: ICD-10-CM

## 2025-08-14 ENCOUNTER — TELEPHONE (OUTPATIENT)
Dept: TRANSPLANT | Facility: CLINIC | Age: 40
End: 2025-08-14
Payer: COMMERCIAL

## 2025-08-14 DIAGNOSIS — A04.5 CAMPYLOBACTER GASTROENTERITIS: ICD-10-CM

## 2025-08-14 DIAGNOSIS — N18.6 ESRD (END STAGE RENAL DISEASE) (H): Primary | ICD-10-CM

## 2025-08-14 DIAGNOSIS — Z76.82 ORGAN TRANSPLANT CANDIDATE: ICD-10-CM

## 2025-09-04 ENCOUNTER — LAB (OUTPATIENT)
Dept: LAB | Facility: CLINIC | Age: 40
End: 2025-09-04
Payer: COMMERCIAL

## 2025-09-04 DIAGNOSIS — Z76.82 ORGAN TRANSPLANT CANDIDATE: ICD-10-CM

## 2025-09-04 DIAGNOSIS — N18.6 ESRD (END STAGE RENAL DISEASE) (H): ICD-10-CM

## 2025-09-04 DIAGNOSIS — A04.5 CAMPYLOBACTER GASTROENTERITIS: ICD-10-CM

## (undated) RX ORDER — AMINOPHYLLINE 25 MG/ML
INJECTION, SOLUTION INTRAVENOUS
Status: DISPENSED
Start: 2025-07-14

## (undated) RX ORDER — REGADENOSON 0.08 MG/ML
INJECTION, SOLUTION INTRAVENOUS
Status: DISPENSED
Start: 2025-07-14

## (undated) RX ORDER — AMPICILLIN 250 MG/1
INJECTION, POWDER, FOR SOLUTION INTRAMUSCULAR; INTRAVENOUS
Status: DISPENSED
Start: 2025-06-24

## (undated) RX ORDER — FENTANYL CITRATE 50 UG/ML
INJECTION, SOLUTION INTRAMUSCULAR; INTRAVENOUS
Status: DISPENSED
Start: 2025-04-30

## (undated) RX ORDER — CLINDAMYCIN PHOSPHATE 900 MG/50ML
INJECTION, SOLUTION INTRAVENOUS
Status: DISPENSED
Start: 2025-04-30

## (undated) RX ORDER — PENICILLIN G POTASSIUM 5000000 [IU]/1
INJECTION, POWDER, FOR SOLUTION INTRAMUSCULAR; INTRAVENOUS
Status: DISPENSED
Start: 2025-06-24

## (undated) RX ORDER — LIDOCAINE HYDROCHLORIDE 10 MG/ML
INJECTION, SOLUTION EPIDURAL; INFILTRATION; INTRACAUDAL; PERINEURAL
Status: DISPENSED
Start: 2025-04-30